# Patient Record
Sex: MALE | Race: WHITE | NOT HISPANIC OR LATINO | Employment: FULL TIME | URBAN - METROPOLITAN AREA
[De-identification: names, ages, dates, MRNs, and addresses within clinical notes are randomized per-mention and may not be internally consistent; named-entity substitution may affect disease eponyms.]

---

## 2017-01-04 RX ORDER — BUPROPION HYDROCHLORIDE 300 MG/1
TABLET ORAL
Qty: 90 TABLET | Refills: 0 | Status: SHIPPED | OUTPATIENT
Start: 2017-01-04 | End: 2017-04-03 | Stop reason: SDUPTHER

## 2017-02-22 RX ORDER — FLUOXETINE HYDROCHLORIDE 20 MG/1
CAPSULE ORAL
Qty: 90 CAPSULE | Refills: 0 | Status: SHIPPED | OUTPATIENT
Start: 2017-02-22 | End: 2017-05-08 | Stop reason: SDUPTHER

## 2017-02-24 ENCOUNTER — PATIENT MESSAGE (OUTPATIENT)
Dept: FAMILY MEDICINE | Facility: CLINIC | Age: 52
End: 2017-02-24

## 2017-02-24 DIAGNOSIS — Z00.00 ANNUAL PHYSICAL EXAM: Primary | ICD-10-CM

## 2017-03-09 RX ORDER — TRAZODONE HYDROCHLORIDE 100 MG/1
TABLET ORAL
Qty: 180 TABLET | Refills: 0 | Status: SHIPPED | OUTPATIENT
Start: 2017-03-09 | End: 2017-05-08 | Stop reason: SDUPTHER

## 2017-04-03 RX ORDER — BUPROPION HYDROCHLORIDE 300 MG/1
TABLET ORAL
Qty: 90 TABLET | Refills: 0 | Status: SHIPPED | OUTPATIENT
Start: 2017-04-03 | End: 2017-05-08 | Stop reason: SDUPTHER

## 2017-05-01 ENCOUNTER — LAB VISIT (OUTPATIENT)
Dept: LAB | Facility: HOSPITAL | Age: 52
End: 2017-05-01
Attending: FAMILY MEDICINE
Payer: COMMERCIAL

## 2017-05-01 DIAGNOSIS — Z00.00 ANNUAL PHYSICAL EXAM: ICD-10-CM

## 2017-05-01 DIAGNOSIS — Z11.59 NEED FOR HEPATITIS C SCREENING TEST: ICD-10-CM

## 2017-05-01 LAB
ALBUMIN SERPL BCP-MCNC: 3.7 G/DL
ALP SERPL-CCNC: 78 U/L
ALT SERPL W/O P-5'-P-CCNC: 47 U/L
ANION GAP SERPL CALC-SCNC: 7 MMOL/L
AST SERPL-CCNC: 86 U/L
BASOPHILS # BLD AUTO: 0.04 K/UL
BASOPHILS NFR BLD: 0.5 %
BILIRUB SERPL-MCNC: 0.4 MG/DL
BUN SERPL-MCNC: 13 MG/DL
CALCIUM SERPL-MCNC: 9 MG/DL
CHLORIDE SERPL-SCNC: 106 MMOL/L
CHOLEST/HDLC SERPL: 5.7 {RATIO}
CO2 SERPL-SCNC: 27 MMOL/L
CREAT SERPL-MCNC: 0.9 MG/DL
DIFFERENTIAL METHOD: ABNORMAL
EOSINOPHIL # BLD AUTO: 0.1 K/UL
EOSINOPHIL NFR BLD: 1.3 %
ERYTHROCYTE [DISTWIDTH] IN BLOOD BY AUTOMATED COUNT: 12.5 %
EST. GFR  (AFRICAN AMERICAN): >60 ML/MIN/1.73 M^2
EST. GFR  (NON AFRICAN AMERICAN): >60 ML/MIN/1.73 M^2
GLUCOSE SERPL-MCNC: 98 MG/DL
HCT VFR BLD AUTO: 41.4 %
HCV AB SERPL QL IA: NEGATIVE
HDL/CHOLESTEROL RATIO: 17.6 %
HDLC SERPL-MCNC: 159 MG/DL
HDLC SERPL-MCNC: 28 MG/DL
HGB BLD-MCNC: 14.6 G/DL
LDLC SERPL CALC-MCNC: 108.2 MG/DL
LYMPHOCYTES # BLD AUTO: 2 K/UL
LYMPHOCYTES NFR BLD: 25.5 %
MCH RBC QN AUTO: 32.7 PG
MCHC RBC AUTO-ENTMCNC: 35.3 %
MCV RBC AUTO: 93 FL
MONOCYTES # BLD AUTO: 0.6 K/UL
MONOCYTES NFR BLD: 7.9 %
NEUTROPHILS # BLD AUTO: 5.1 K/UL
NEUTROPHILS NFR BLD: 64.5 %
NONHDLC SERPL-MCNC: 131 MG/DL
PLATELET # BLD AUTO: 230 K/UL
PMV BLD AUTO: 9.6 FL
POTASSIUM SERPL-SCNC: 4.2 MMOL/L
PROT SERPL-MCNC: 7 G/DL
RBC # BLD AUTO: 4.46 M/UL
SODIUM SERPL-SCNC: 140 MMOL/L
TRIGL SERPL-MCNC: 114 MG/DL
TSH SERPL DL<=0.005 MIU/L-ACNC: 1.55 UIU/ML
WBC # BLD AUTO: 7.96 K/UL

## 2017-05-01 PROCEDURE — 36415 COLL VENOUS BLD VENIPUNCTURE: CPT | Mod: PO

## 2017-05-01 PROCEDURE — 80053 COMPREHEN METABOLIC PANEL: CPT

## 2017-05-01 PROCEDURE — 80061 LIPID PANEL: CPT

## 2017-05-01 PROCEDURE — 85025 COMPLETE CBC W/AUTO DIFF WBC: CPT

## 2017-05-01 PROCEDURE — 84443 ASSAY THYROID STIM HORMONE: CPT

## 2017-05-01 PROCEDURE — 86803 HEPATITIS C AB TEST: CPT

## 2017-05-08 ENCOUNTER — LAB VISIT (OUTPATIENT)
Dept: LAB | Facility: HOSPITAL | Age: 52
End: 2017-05-08
Attending: FAMILY MEDICINE
Payer: COMMERCIAL

## 2017-05-08 ENCOUNTER — OFFICE VISIT (OUTPATIENT)
Dept: FAMILY MEDICINE | Facility: CLINIC | Age: 52
End: 2017-05-08
Payer: COMMERCIAL

## 2017-05-08 VITALS
DIASTOLIC BLOOD PRESSURE: 70 MMHG | SYSTOLIC BLOOD PRESSURE: 110 MMHG | HEART RATE: 72 BPM | TEMPERATURE: 98 F | WEIGHT: 183.44 LBS | HEIGHT: 71 IN | BODY MASS INDEX: 25.68 KG/M2

## 2017-05-08 DIAGNOSIS — D49.9 PRECANCEROUS LESION: ICD-10-CM

## 2017-05-08 DIAGNOSIS — R79.89 ELEVATED LIVER FUNCTION TESTS: ICD-10-CM

## 2017-05-08 DIAGNOSIS — F32.A DEPRESSION, UNSPECIFIED DEPRESSION TYPE: ICD-10-CM

## 2017-05-08 DIAGNOSIS — F41.9 ANXIETY: Primary | ICD-10-CM

## 2017-05-08 LAB
ALBUMIN SERPL BCP-MCNC: 3.9 G/DL
ALP SERPL-CCNC: 75 U/L
ALT SERPL W/O P-5'-P-CCNC: 36 U/L
AST SERPL-CCNC: 26 U/L
BILIRUB DIRECT SERPL-MCNC: 0.1 MG/DL
BILIRUB SERPL-MCNC: 0.4 MG/DL
FERRITIN SERPL-MCNC: 24 NG/ML
PROT SERPL-MCNC: 7.4 G/DL

## 2017-05-08 PROCEDURE — 82728 ASSAY OF FERRITIN: CPT

## 2017-05-08 PROCEDURE — 80074 ACUTE HEPATITIS PANEL: CPT

## 2017-05-08 PROCEDURE — 80076 HEPATIC FUNCTION PANEL: CPT

## 2017-05-08 PROCEDURE — 17000 DESTRUCT PREMALG LESION: CPT | Mod: S$GLB,,, | Performed by: FAMILY MEDICINE

## 2017-05-08 PROCEDURE — 99396 PREV VISIT EST AGE 40-64: CPT | Mod: 25,S$GLB,, | Performed by: FAMILY MEDICINE

## 2017-05-08 PROCEDURE — 99999 PR PBB SHADOW E&M-EST. PATIENT-LVL III: CPT | Mod: PBBFAC,,, | Performed by: FAMILY MEDICINE

## 2017-05-08 PROCEDURE — 36415 COLL VENOUS BLD VENIPUNCTURE: CPT | Mod: PO

## 2017-05-08 RX ORDER — FLUOXETINE HYDROCHLORIDE 20 MG/1
20 CAPSULE ORAL DAILY
Qty: 90 CAPSULE | Refills: 3 | Status: SHIPPED | OUTPATIENT
Start: 2017-05-08 | End: 2018-01-28 | Stop reason: SDUPTHER

## 2017-05-08 RX ORDER — TRAZODONE HYDROCHLORIDE 100 MG/1
200 TABLET ORAL NIGHTLY
Qty: 180 TABLET | Refills: 3 | Status: SHIPPED | OUTPATIENT
Start: 2017-05-08 | End: 2018-03-12 | Stop reason: SDUPTHER

## 2017-05-08 RX ORDER — BUPROPION HYDROCHLORIDE 300 MG/1
300 TABLET ORAL DAILY
Qty: 90 TABLET | Refills: 3 | Status: SHIPPED | OUTPATIENT
Start: 2017-05-08 | End: 2018-10-03 | Stop reason: SDUPTHER

## 2017-05-08 NOTE — MR AVS SNAPSHOT
[facility]  [address] [facility], [address]  [facility] [address]  Phone: [phone]  Fax: [phone]                  Cristobal Ramirez   2017 10:00 AM   Office Visit    Description:  Male : 1965   Provider:  Judy Davenport MD   Department:  [facility]           Reason for Visit     Annual Exam           Diagnoses this Visit        Comments    Anxiety    -  Primary     Depression, unspecified depression type         Elevated liver function tests                To Do List           Goals (5 Years of Data)     None       These Medications        Disp Refills Start End    trazodone (DESYREL) 100 MG tablet 180 tablet 3 2017     Take 2 tablets (200 mg total) by mouth nightly. - Oral    Pharmacy: [facility] [address] [place] [address] [facility] [address] [facility] #: [phone]       fluoxetine (PROZAC) 20 MG capsule 90 capsule 3 2017     Take 1 capsule (20 mg total) by mouth once daily. - Oral    Pharmacy: [facility] [address] [place] [address] [place] [place] [place] [facility] [address] [facility] Ph #: [phone]       buPROPion (WELLBUTRIN XL) 300 MG 24 hr tablet 90 tablet 3 2017     Take 1 tablet (300 mg total) by mouth once daily. - Oral    [facility] [address] [place] [address] [facility] [address] [facility] Ph #: [phone]         [facility] On Call     [facility] Nurse Care Line -  Assistance  Unless otherwise directed by your provider, please contact [facility], our nurse care line that is available for / assistance.     Registered nurses in the [facility] provide: appointment scheduling, clinical advisement, health education, and other advisory services.  Call: [phone] (toll free)               Medications           Message regarding Medications     Verify the changes and/or additions to your medication regime listed below are the same  "as discussed with your clinician today.  If any of these changes or additions are incorrect, please notify your healthcare provider.        CHANGE how you are taking these medications     Start Taking Instead of    trazodone (DESYREL) 100 MG tablet trazodone (DESYREL) 100 MG tablet    Dosage:  Take 2 tablets (200 mg total) by mouth nightly. Dosage:  TAKE 2 TABLETS BY MOUTH EVERY NIGHT AT BEDTIME    Reason for Change:  Reorder     fluoxetine (PROZAC) 20 MG capsule fluoxetine (PROZAC) 20 MG capsule    Dosage:  Take 1 capsule (20 mg total) by mouth once daily. Dosage:  TAKE 1 CAPSULE BY MOUTH EVERY DAY    Reason for Change:  Reorder     buPROPion (WELLBUTRIN XL) 300 MG 24 hr tablet buPROPion (WELLBUTRIN XL) 300 MG 24 hr tablet    Dosage:  Take 1 tablet (300 mg total) by mouth once daily. Dosage:  TAKE 1 TABLET BY MOUTH DAILY    Reason for Change:  Reorder       STOP taking these medications     sodium,potassium,mag sulfates (SUPREP BOWEL PREP KIT) 17.5-3.13-1.6 gram SolR Take 1 each by mouth as directed.           Verify that the below list of medications is an accurate representation of the medications you are currently taking.  If none reported, the list may be blank. If incorrect, please contact your healthcare provider. Carry this list with you in case of emergency.           Current Medications     buPROPion (WELLBUTRIN XL) 300 MG 24 hr tablet Take 1 tablet (300 mg total) by mouth once daily.    fluoxetine (PROZAC) 20 MG capsule Take 1 capsule (20 mg total) by mouth once daily.    trazodone (DESYREL) 100 MG tablet Take 2 tablets (200 mg total) by mouth nightly.    TRUVADA 200-300 mg Tab TK 1 T PO  D           Clinical Reference Information           Your Vitals Were     BP Pulse Temp Height Weight BMI    110/70 (BP Location: Right arm) 72 97.8 °F (36.6 °C) 5' 10.5" (1.791 m) 83.2 kg (183 lb 6.8 oz) 25.95 kg/m2      Blood Pressure          Most Recent Value    BP  110/70      Allergies as of 5/8/2017     No Known " Allergies      Immunizations Administered on Date of Encounter - 5/8/2017     None      Orders Placed During Today's Visit     Future Labs/Procedures Expected by Expires    Ferritin  5/8/2017 7/7/2018    Hepatic function panel  5/8/2017 7/7/2018    HEPATITIS PANEL, ACUTE  5/8/2017 7/7/2018      Smoking Cessation     If you would like to quit smoking:   You may be eligible for free services if you are a [place] resident and started smoking cigarettes before September 1, 1988.  Call the Smoking Cessation [employer] ([facility]) toll free at [phone] or [phone].   Call 0-356-QUIT-NOW if you do not meet the above criteria.   Contact us via email: [email or web address]   View our website for more information: [email or web address]        Language Assistance Services     ATTENTION: Language assistance services are available, free of charge. Please call [phone].      ATENCIÓN: Si habla español, tiene a galvez disposición servicios gratuitos de asistencia lingüística. Llame al [phone].     CHÚ Ý: N?u b?n nói Ti?ng Vi?t, có các d?ch v? h? tr? ngôn ng? mi?n phí dành cho b?n. G?i s? [phone].         [facility] complies with applicable Federal civil rights laws and does not discriminate on the basis of race, color, national origin, age, disability, or sex.

## 2017-05-09 LAB
HAV IGM SERPL QL IA: NEGATIVE
HBV CORE IGM SERPL QL IA: NEGATIVE
HBV SURFACE AG SERPL QL IA: NEGATIVE
HCV AB SERPL QL IA: NEGATIVE

## 2017-05-09 NOTE — PROGRESS NOTES
Cristobal Ramirez is a 51 y.o. male     Chief Complaint:  Physical Exam  Source of history: Patient  Past Medical History:   Diagnosis Date    Allergy     Anxiety     Depression      Patient  reports that he has been smoking Cigarettes.  He has been smoking about 1.00 pack per day. He has never used smokeless tobacco. He reports that he does not drink alcohol or use illicit drugs.  No family history on file.  ROS:                                                                                GENERAL: No fever, chills, fatigability or weight loss. .  SKIN: No rashes, pt spends a lot of time on the beach noted  changes in color and texture of abdomen nevus  HEAD: No headaches or recent head trauma.  EYES: Visual acuity fine. No photophobia, ocular pain or diplopia.  EARS: Denies ear pain, discharge or vertigo.  NOSE: No loss of smell, no epistaxis or postnasal drip.  MOUTH & THROAT: No hoarseness or change in voice. No excessive gum bleeding.  NODES: Denies swollen glands.  CHEST: Denies WHEAT, cyanosis, wheezing, cough and sputum production.  CARDIOVASCULAR: Denies chest pain, PND, orthopnea or reduced exercise tolerance.  ABDOMEN: Appetite fine. No weight loss. Denies diarrhea, abdominal pain, hematemesis or blood in stool.  URINARY: No flank pain, dysuria or hematuria. Frequency of urination at nighttime.  PERIPHERAL VASCULAR: No claudication or cyanosis.  MUSCULOSKELETAL: no complaints  NEUROLOGIC: No history of seizures, paralysis, alteration of gait or coordination.    OBJECTIVE:  APPEARANCE: Well nourished, well developed, in no acute distress.   VS:  see nurses notes  May 8, 2017  SKIN: No lesions, good turgor, no rashes. Large erythematous Precancerous appearing lesion on the abdomen area  HEENT: TMs clear bilaterally, ear canals clear bilaterally, nasal mucosa clear bilaterally, sinuses nontender to percussion, throat mild postnasal drip.  HEAD: Normocephalic, nontender to palpation.  NECK: Supple  nontender, no carotid bruits, no thyromegaly.  NODES: Normal.  CHEST: Clear bilaterally, with good respiratory excursion, no evidence of respiratory distress.  CARDIOVASCULAR: S1, S2, regular rate and rhythm without murmur.  BREASTS: No masses palpated in either breast area, or axillary area, symmetry is noted.  ABDOMEN: Soft nontender no hepatosplenomegaly, no guarding or rebound, no pulsatile mass.  PERIPHERAL VASCULAR: Distal pulses palpable throughout, normal capillary refill in all distal extremities, no edema.  MUSCULOSKELETAL: No abnormalities noted.  BACK: No scoliosis, no spasm, cervical, thoracic, lumbar spine nontender to palpation  NEUROLOGIC: Cranial nerves II through XII grossly intact, motor exam 5 out of 5 on distal extremities,   no gait disturbances, sensation intact in all distal extremities  MENTAL STATUS: Patient oriented x3, normal affect, normal mood    ASSESSMENT/PLAN:   Cristobal was seen today for annual exam.    Diagnoses and all orders for this visit:    Anxiety  -     trazodone (DESYREL) 100 MG tablet; Take 2 tablets (200 mg total) by mouth nightly.  -     fluoxetine (PROZAC) 20 MG capsule; Take 1 capsule (20 mg total) by mouth once daily.  -     buPROPion (WELLBUTRIN XL) 300 MG 24 hr tablet; Take 1 tablet (300 mg total) by mouth once daily.    Depression, unspecified depression type  -     trazodone (DESYREL) 100 MG tablet; Take 2 tablets (200 mg total) by mouth nightly.  -     fluoxetine (PROZAC) 20 MG capsule; Take 1 capsule (20 mg total) by mouth once daily.  -     buPROPion (WELLBUTRIN XL) 300 MG 24 hr tablet; Take 1 tablet (300 mg total) by mouth once daily.    Elevated liver function tests  -     HEPATITIS PANEL, ACUTE; Future  -     Ferritin; Future  -     Hepatic function panel; Future    large aktinic keratosis on superior abdomen  With pt permission cryotherapy applied x3 with good margins.  Pt tolerated procedure without difficulty  Patient will apply bandage after skin  exfoliates allowed to heal  Follow-up if nevus seems to reoccur    We will contact patient with results of testing when available.

## 2017-07-06 RX ORDER — BUPROPION HYDROCHLORIDE 300 MG/1
TABLET ORAL
Qty: 90 TABLET | Refills: 0 | Status: SHIPPED | OUTPATIENT
Start: 2017-07-06 | End: 2018-10-03 | Stop reason: SDUPTHER

## 2017-12-12 RX ORDER — BUPROPION HYDROCHLORIDE 300 MG/1
TABLET ORAL
Qty: 90 TABLET | Refills: 0 | Status: SHIPPED | OUTPATIENT
Start: 2017-12-12 | End: 2018-10-03 | Stop reason: SDUPTHER

## 2018-01-28 DIAGNOSIS — F32.A DEPRESSION, UNSPECIFIED DEPRESSION TYPE: ICD-10-CM

## 2018-01-28 DIAGNOSIS — F41.9 ANXIETY: ICD-10-CM

## 2018-01-28 RX ORDER — FLUOXETINE HYDROCHLORIDE 20 MG/1
CAPSULE ORAL
Qty: 90 CAPSULE | Refills: 0 | Status: SHIPPED | OUTPATIENT
Start: 2018-01-28 | End: 2018-07-26 | Stop reason: SDUPTHER

## 2018-03-07 RX ORDER — BUPROPION HYDROCHLORIDE 300 MG/1
TABLET ORAL
Qty: 90 TABLET | Refills: 0 | Status: SHIPPED | OUTPATIENT
Start: 2018-03-07 | End: 2018-10-03 | Stop reason: SDUPTHER

## 2018-03-12 DIAGNOSIS — F32.A DEPRESSION, UNSPECIFIED DEPRESSION TYPE: ICD-10-CM

## 2018-03-12 DIAGNOSIS — F41.9 ANXIETY: ICD-10-CM

## 2018-03-12 RX ORDER — TRAZODONE HYDROCHLORIDE 100 MG/1
TABLET ORAL
Qty: 180 TABLET | Refills: 0 | Status: SHIPPED | OUTPATIENT
Start: 2018-03-12 | End: 2018-05-28 | Stop reason: SDUPTHER

## 2018-05-28 DIAGNOSIS — F41.9 ANXIETY: ICD-10-CM

## 2018-05-28 DIAGNOSIS — F32.A DEPRESSION, UNSPECIFIED DEPRESSION TYPE: ICD-10-CM

## 2018-05-28 RX ORDER — TRAZODONE HYDROCHLORIDE 100 MG/1
TABLET ORAL
Qty: 180 TABLET | Refills: 0 | Status: SHIPPED | OUTPATIENT
Start: 2018-05-28 | End: 2018-08-16 | Stop reason: SDUPTHER

## 2018-06-05 RX ORDER — BUPROPION HYDROCHLORIDE 300 MG/1
TABLET ORAL
Qty: 90 TABLET | Refills: 0 | Status: SHIPPED | OUTPATIENT
Start: 2018-06-05 | End: 2018-10-03 | Stop reason: SDUPTHER

## 2018-07-26 DIAGNOSIS — F41.9 ANXIETY: ICD-10-CM

## 2018-07-26 DIAGNOSIS — F32.A DEPRESSION, UNSPECIFIED DEPRESSION TYPE: ICD-10-CM

## 2018-07-26 RX ORDER — FLUOXETINE HYDROCHLORIDE 20 MG/1
CAPSULE ORAL
Qty: 90 CAPSULE | Refills: 0 | Status: SHIPPED | OUTPATIENT
Start: 2018-07-26 | End: 2019-10-30 | Stop reason: SDUPTHER

## 2018-08-16 DIAGNOSIS — F32.A DEPRESSION, UNSPECIFIED DEPRESSION TYPE: ICD-10-CM

## 2018-08-16 DIAGNOSIS — F41.9 ANXIETY: ICD-10-CM

## 2018-08-16 RX ORDER — TRAZODONE HYDROCHLORIDE 100 MG/1
TABLET ORAL
Qty: 180 TABLET | Refills: 0 | Status: SHIPPED | OUTPATIENT
Start: 2018-08-16 | End: 2019-02-22

## 2018-08-28 RX ORDER — BUPROPION HYDROCHLORIDE 300 MG/1
TABLET ORAL
Qty: 90 TABLET | Refills: 0 | Status: ON HOLD | OUTPATIENT
Start: 2018-08-28 | End: 2019-09-04 | Stop reason: HOSPADM

## 2018-09-12 ENCOUNTER — TELEPHONE (OUTPATIENT)
Dept: FAMILY MEDICINE | Facility: CLINIC | Age: 53
End: 2018-09-12

## 2018-09-12 DIAGNOSIS — Z00.00 ROUTINE GENERAL MEDICAL EXAMINATION AT A HEALTH CARE FACILITY: Primary | ICD-10-CM

## 2018-09-28 ENCOUNTER — LAB VISIT (OUTPATIENT)
Dept: LAB | Facility: HOSPITAL | Age: 53
End: 2018-09-28
Attending: FAMILY MEDICINE
Payer: COMMERCIAL

## 2018-09-28 DIAGNOSIS — Z00.00 ROUTINE GENERAL MEDICAL EXAMINATION AT A HEALTH CARE FACILITY: ICD-10-CM

## 2018-09-28 LAB
ALBUMIN SERPL BCP-MCNC: 3.9 G/DL
ALP SERPL-CCNC: 82 U/L
ALT SERPL W/O P-5'-P-CCNC: 25 U/L
ANION GAP SERPL CALC-SCNC: 6 MMOL/L
AST SERPL-CCNC: 19 U/L
BASOPHILS # BLD AUTO: 0.06 K/UL
BASOPHILS NFR BLD: 0.7 %
BILIRUB SERPL-MCNC: 0.4 MG/DL
BUN SERPL-MCNC: 13 MG/DL
CALCIUM SERPL-MCNC: 9.3 MG/DL
CHLORIDE SERPL-SCNC: 108 MMOL/L
CHOLEST SERPL-MCNC: 168 MG/DL
CHOLEST/HDLC SERPL: 5.1 {RATIO}
CO2 SERPL-SCNC: 24 MMOL/L
CREAT SERPL-MCNC: 1 MG/DL
DIFFERENTIAL METHOD: ABNORMAL
EOSINOPHIL # BLD AUTO: 0.1 K/UL
EOSINOPHIL NFR BLD: 1.1 %
ERYTHROCYTE [DISTWIDTH] IN BLOOD BY AUTOMATED COUNT: 11.9 %
EST. GFR  (AFRICAN AMERICAN): >60 ML/MIN/1.73 M^2
EST. GFR  (NON AFRICAN AMERICAN): >60 ML/MIN/1.73 M^2
GLUCOSE SERPL-MCNC: 98 MG/DL
HCT VFR BLD AUTO: 43.3 %
HDLC SERPL-MCNC: 33 MG/DL
HDLC SERPL: 19.6 %
HGB BLD-MCNC: 14.6 G/DL
IMM GRANULOCYTES # BLD AUTO: 0.02 K/UL
IMM GRANULOCYTES NFR BLD AUTO: 0.2 %
LDLC SERPL CALC-MCNC: 112.8 MG/DL
LYMPHOCYTES # BLD AUTO: 2.4 K/UL
LYMPHOCYTES NFR BLD: 29.9 %
MCH RBC QN AUTO: 32.4 PG
MCHC RBC AUTO-ENTMCNC: 33.7 G/DL
MCV RBC AUTO: 96 FL
MONOCYTES # BLD AUTO: 0.6 K/UL
MONOCYTES NFR BLD: 7.3 %
NEUTROPHILS # BLD AUTO: 4.9 K/UL
NEUTROPHILS NFR BLD: 60.8 %
NONHDLC SERPL-MCNC: 135 MG/DL
NRBC BLD-RTO: 0 /100 WBC
PLATELET # BLD AUTO: 261 K/UL
PMV BLD AUTO: 10.2 FL
POTASSIUM SERPL-SCNC: 4.1 MMOL/L
PROT SERPL-MCNC: 7.2 G/DL
RBC # BLD AUTO: 4.5 M/UL
SODIUM SERPL-SCNC: 138 MMOL/L
TRIGL SERPL-MCNC: 111 MG/DL
TSH SERPL DL<=0.005 MIU/L-ACNC: 1.76 UIU/ML
WBC # BLD AUTO: 8.12 K/UL

## 2018-09-28 PROCEDURE — 84443 ASSAY THYROID STIM HORMONE: CPT

## 2018-09-28 PROCEDURE — 80061 LIPID PANEL: CPT

## 2018-09-28 PROCEDURE — 80053 COMPREHEN METABOLIC PANEL: CPT

## 2018-09-28 PROCEDURE — 85025 COMPLETE CBC W/AUTO DIFF WBC: CPT

## 2018-09-28 PROCEDURE — 36415 COLL VENOUS BLD VENIPUNCTURE: CPT | Mod: PO

## 2018-10-03 ENCOUNTER — OFFICE VISIT (OUTPATIENT)
Dept: FAMILY MEDICINE | Facility: CLINIC | Age: 53
End: 2018-10-03
Payer: COMMERCIAL

## 2018-10-03 ENCOUNTER — LAB VISIT (OUTPATIENT)
Dept: LAB | Facility: HOSPITAL | Age: 53
End: 2018-10-03
Attending: FAMILY MEDICINE
Payer: COMMERCIAL

## 2018-10-03 VITALS
RESPIRATION RATE: 20 BRPM | WEIGHT: 189.38 LBS | BODY MASS INDEX: 26.51 KG/M2 | HEIGHT: 71 IN | TEMPERATURE: 98 F | DIASTOLIC BLOOD PRESSURE: 68 MMHG | SYSTOLIC BLOOD PRESSURE: 116 MMHG

## 2018-10-03 DIAGNOSIS — Z12.5 ENCOUNTER FOR PROSTATE CANCER SCREENING: ICD-10-CM

## 2018-10-03 DIAGNOSIS — C44.41 BASAL CELL CARCINOMA (BCC) OF SKIN OF NECK: ICD-10-CM

## 2018-10-03 DIAGNOSIS — Z23 NEED FOR PROPHYLACTIC VACCINATION AND INOCULATION AGAINST INFLUENZA: Primary | ICD-10-CM

## 2018-10-03 DIAGNOSIS — F34.1 DYSTHYMIA: ICD-10-CM

## 2018-10-03 LAB — COMPLEXED PSA SERPL-MCNC: 0.91 NG/ML

## 2018-10-03 PROCEDURE — 84153 ASSAY OF PSA TOTAL: CPT

## 2018-10-03 PROCEDURE — 82270 OCCULT BLOOD FECES: CPT | Mod: S$GLB,,, | Performed by: FAMILY MEDICINE

## 2018-10-03 PROCEDURE — 90471 IMMUNIZATION ADMIN: CPT | Mod: S$GLB,,, | Performed by: FAMILY MEDICINE

## 2018-10-03 PROCEDURE — 99999 PR PBB SHADOW E&M-EST. PATIENT-LVL III: CPT | Mod: PBBFAC,,, | Performed by: FAMILY MEDICINE

## 2018-10-03 PROCEDURE — 99396 PREV VISIT EST AGE 40-64: CPT | Mod: 25,S$GLB,, | Performed by: FAMILY MEDICINE

## 2018-10-03 PROCEDURE — 36415 COLL VENOUS BLD VENIPUNCTURE: CPT | Mod: PO

## 2018-10-03 PROCEDURE — 90686 IIV4 VACC NO PRSV 0.5 ML IM: CPT | Mod: S$GLB,,, | Performed by: FAMILY MEDICINE

## 2018-10-03 RX ORDER — BUPROPION HYDROCHLORIDE 300 MG/1
300 TABLET ORAL DAILY
Qty: 30 TABLET | Refills: 11 | Status: SHIPPED | OUTPATIENT
Start: 2018-10-03 | End: 2019-10-19 | Stop reason: SDUPTHER

## 2018-10-03 RX ORDER — TRAZODONE HYDROCHLORIDE 100 MG/1
100 TABLET ORAL NIGHTLY
Qty: 30 TABLET | Refills: 11 | Status: SHIPPED | OUTPATIENT
Start: 2018-10-03 | End: 2019-10-03

## 2018-10-03 RX ORDER — FLUOXETINE HYDROCHLORIDE 20 MG/1
20 CAPSULE ORAL DAILY
Qty: 30 CAPSULE | Refills: 11 | Status: ON HOLD | OUTPATIENT
Start: 2018-10-03 | End: 2019-09-04 | Stop reason: HOSPADM

## 2019-02-22 ENCOUNTER — TELEPHONE (OUTPATIENT)
Dept: FAMILY MEDICINE | Facility: CLINIC | Age: 54
End: 2019-02-22

## 2019-02-22 DIAGNOSIS — F32.A DEPRESSION, UNSPECIFIED DEPRESSION TYPE: ICD-10-CM

## 2019-02-22 DIAGNOSIS — F41.9 ANXIETY: ICD-10-CM

## 2019-02-22 RX ORDER — TRAZODONE HYDROCHLORIDE 100 MG/1
200 TABLET ORAL NIGHTLY PRN
Qty: 180 TABLET | Refills: 0 | Status: SHIPPED | OUTPATIENT
Start: 2019-02-22 | End: 2019-05-18 | Stop reason: SDUPTHER

## 2019-02-22 NOTE — TELEPHONE ENCOUNTER
----- Message from Corinne Mary sent at 2/22/2019  4:06 PM CST -----  Contact: Pt Mobile/Home [phone]   Patient is calling in regards to his medication for traZODone (DESYREL) 100 MG. He said that he usually take two tablets each day but a script was sent to his pharmacy on today and the directions says for him to take one pill a day. Patient would like a call back to ask you about the change in the dosage please?

## 2019-02-22 NOTE — TELEPHONE ENCOUNTER
Trazodone RX was sent to pharmacy on 8/16/18 to take 2 tabs QHS, and then a RX was sent for 1 tab QHS on 10/3/18  Disp. 30 tabs with 11 refills. Nothing was sent today. please advise.

## 2019-04-30 NOTE — PROGRESS NOTES
Cristobal Ramirez is a 52 y.o. male     Chief Complaint:  Physical Exam  Source of history: Patient  Past Medical History:   Diagnosis Date    Allergy     Anxiety     Depression      Patient  reports that he has been smoking cigarettes.  He has been smoking about 1.00 pack per day. he has never used smokeless tobacco. He reports that he does not drink alcohol or use drugs.  History reviewed. No pertinent family history.  ROS:                                                                                GENERAL: No fever, chills, fatigability or weight loss. .  SKIN: No rashes, itching or changes in color or texture of skin.  HEAD: No headaches or recent head trauma.  EYES: Visual acuity fine. No photophobia, ocular pain or diplopia.  EARS: Denies ear pain, discharge or vertigo.  NOSE: No loss of smell, no epistaxis or postnasal drip.  MOUTH & THROAT: No hoarseness or change in voice. No excessive gum bleeding.  NODES: Denies swollen glands.  CHEST: Denies WHEAT, cyanosis, wheezing, cough and sputum production.  CARDIOVASCULAR: Denies chest pain, PND, orthopnea or reduced exercise tolerance.  ABDOMEN: Appetite fine. No weight loss. Denies diarrhea, abdominal pain, hematemesis or blood in stool.  URINARY: No flank pain, dysuria or hematuria. Frequency of urination at nighttime.  PERIPHERAL VASCULAR: No claudication or cyanosis.  MUSCULOSKELETAL:  No complaints  NEUROLOGIC: No history of seizures, paralysis, alteration of gait or coordination.    OBJECTIVE:  APPEARANCE: Well nourished, well developed, in no acute distress.   VS:  see nurses notes 10/03/2018  SKIN: No lesions, good turgor, no rashes.  Few basal cell carcinomas on the right shoulder and left shoulder  Actinic keratosis on the upper chest bilaterally  HEENT: TMs clear bilaterally, ear canals clear bilaterally, nasal mucosa clear bilaterally, sinuses nontender to percussion, throat mild postnasal drip.  HEAD: Normocephalic, nontender to  palpation.  NECK: Supple nontender, no carotid bruits, no thyromegaly.  NODES: Normal.  CHEST: Clear bilaterally, with good respiratory excursion, no evidence of respiratory distress.  CARDIOVASCULAR: S1, S2, regular rate and rhythm without murmur.  BREASTS: No masses palpated in either breast area, or axillary area, symmetry is noted.  ABDOMEN: Soft nontender no hepatosplenomegaly, no guarding or rebound, no pulsatile mass.  RECTAL: Prostate not enlarged, no masses, no rectal masses noted, Hemoccult negative.  PERIPHERAL VASCULAR: Distal pulses palpable throughout, normal capillary refill in all distal extremities, no edema.  MUSCULOSKELETAL: No abnormalities noted.  BACK: No scoliosis, no spasm, cervical, thoracic, lumbar spine nontender to palpation  NEUROLOGIC: Cranial nerves II through XII grossly intact, motor exam 5 out of 5 on distal extremities,   no gait disturbances, sensation intact in all distal extremities  MENTAL STATUS: Patient oriented x3, normal affect, normal mood    ASSESSMENT/PLAN:   Cristobal was seen today for annual exam.    Diagnoses and all orders for this visit:    Need for prophylactic vaccination and inoculation against influenza  -     Flu Vaccine - Quadrivalent (PF) (3 years & older)    Encounter for prostate cancer screening  -     PSA, Screening; Future    Dysthymia  -     buPROPion (WELLBUTRIN XL) 300 MG 24 hr tablet; Take 1 tablet (300 mg total) by mouth once daily.  -     FLUoxetine (PROZAC) 20 MG capsule; Take 1 capsule (20 mg total) by mouth once daily.  -     traZODone (DESYREL) 100 MG tablet; Take 1 tablet (100 mg total) by mouth every evening.    Basal cell carcinoma (BCC) of skin of neck  -     Ambulatory referral to Dermatology    Actinic keratosis   With patient's permission cryotherapy with good margins was applied to 5 lesions x2 patient tolerated procedure without any difficulty    Smoker no symptoms  Patient not interested in smoking cessation at this time   No

## 2019-05-18 DIAGNOSIS — F41.9 ANXIETY: ICD-10-CM

## 2019-05-18 DIAGNOSIS — F32.A DEPRESSION, UNSPECIFIED DEPRESSION TYPE: ICD-10-CM

## 2019-05-18 RX ORDER — TRAZODONE HYDROCHLORIDE 100 MG/1
TABLET ORAL
Qty: 180 TABLET | Refills: 0 | Status: SHIPPED | OUTPATIENT
Start: 2019-05-18 | End: 2019-08-15 | Stop reason: SDUPTHER

## 2019-06-24 DIAGNOSIS — Z00.00 ANNUAL PHYSICAL EXAM: Primary | ICD-10-CM

## 2019-06-24 DIAGNOSIS — Z12.5 ENCOUNTER FOR PROSTATE CANCER SCREENING: ICD-10-CM

## 2019-08-01 RX ORDER — FLUOXETINE HYDROCHLORIDE 20 MG/1
CAPSULE ORAL
Qty: 30 CAPSULE | Refills: 0 | Status: ON HOLD | OUTPATIENT
Start: 2019-08-01 | End: 2019-09-04 | Stop reason: HOSPADM

## 2019-08-15 DIAGNOSIS — F32.A DEPRESSION, UNSPECIFIED DEPRESSION TYPE: ICD-10-CM

## 2019-08-15 DIAGNOSIS — F41.9 ANXIETY: ICD-10-CM

## 2019-08-15 RX ORDER — TRAZODONE HYDROCHLORIDE 100 MG/1
TABLET ORAL
Qty: 180 TABLET | Refills: 0 | Status: ON HOLD | OUTPATIENT
Start: 2019-08-15 | End: 2019-09-04 | Stop reason: HOSPADM

## 2019-08-31 ENCOUNTER — HOSPITAL ENCOUNTER (OUTPATIENT)
Facility: HOSPITAL | Age: 54
Discharge: HOME | DRG: 501 | End: 2019-09-04
Attending: EMERGENCY MEDICINE | Admitting: HOSPITALIST
Payer: COMMERCIAL

## 2019-08-31 DIAGNOSIS — M25.421 ELBOW SWELLING, RIGHT: ICD-10-CM

## 2019-08-31 DIAGNOSIS — M71.021 ABSCESS OF BURSA OF RIGHT ELBOW: ICD-10-CM

## 2019-08-31 DIAGNOSIS — L03.113 RIGHT ARM CELLULITIS: Primary | ICD-10-CM

## 2019-08-31 LAB
ALBUMIN SERPL BCP-MCNC: 3.2 G/DL (ref 3.5–5.2)
ALP SERPL-CCNC: 78 U/L (ref 55–135)
ALT SERPL W/O P-5'-P-CCNC: 14 U/L (ref 10–44)
ANION GAP SERPL CALC-SCNC: 9 MMOL/L (ref 8–16)
AST SERPL-CCNC: 12 U/L (ref 10–40)
BASOPHILS # BLD AUTO: 0.05 K/UL (ref 0–0.2)
BASOPHILS NFR BLD: 0.7 % (ref 0–1.9)
BILIRUB SERPL-MCNC: 0.2 MG/DL (ref 0.1–1)
BUN SERPL-MCNC: 17 MG/DL (ref 6–20)
CALCIUM SERPL-MCNC: 9.4 MG/DL (ref 8.7–10.5)
CHLORIDE SERPL-SCNC: 105 MMOL/L (ref 95–110)
CO2 SERPL-SCNC: 27 MMOL/L (ref 23–29)
CREAT SERPL-MCNC: 1 MG/DL (ref 0.5–1.4)
CRP SERPL-MCNC: 113.2 MG/L (ref 0–8.2)
DIFFERENTIAL METHOD: ABNORMAL
EOSINOPHIL # BLD AUTO: 0.2 K/UL (ref 0–0.5)
EOSINOPHIL NFR BLD: 3.4 % (ref 0–8)
ERYTHROCYTE [DISTWIDTH] IN BLOOD BY AUTOMATED COUNT: 11.4 % (ref 11.5–14.5)
ERYTHROCYTE [SEDIMENTATION RATE] IN BLOOD BY WESTERGREN METHOD: 42 MM/HR (ref 0–23)
EST. GFR  (AFRICAN AMERICAN): >60 ML/MIN/1.73 M^2
EST. GFR  (NON AFRICAN AMERICAN): >60 ML/MIN/1.73 M^2
GLUCOSE SERPL-MCNC: 104 MG/DL (ref 70–110)
HCT VFR BLD AUTO: 37.3 % (ref 40–54)
HGB BLD-MCNC: 12 G/DL (ref 14–18)
IMM GRANULOCYTES # BLD AUTO: 0.02 K/UL (ref 0–0.04)
IMM GRANULOCYTES NFR BLD AUTO: 0.3 % (ref 0–0.5)
LYMPHOCYTES # BLD AUTO: 1.7 K/UL (ref 1–4.8)
LYMPHOCYTES NFR BLD: 24 % (ref 18–48)
MCH RBC QN AUTO: 31.8 PG (ref 27–31)
MCHC RBC AUTO-ENTMCNC: 32.2 G/DL (ref 32–36)
MCV RBC AUTO: 99 FL (ref 82–98)
MONOCYTES # BLD AUTO: 0.6 K/UL (ref 0.3–1)
MONOCYTES NFR BLD: 8.2 % (ref 4–15)
NEUTROPHILS # BLD AUTO: 4.4 K/UL (ref 1.8–7.7)
NEUTROPHILS NFR BLD: 63.4 % (ref 38–73)
NRBC BLD-RTO: 0 /100 WBC
PLATELET # BLD AUTO: 324 K/UL (ref 150–350)
PMV BLD AUTO: 9.7 FL (ref 9.2–12.9)
POTASSIUM SERPL-SCNC: 3.9 MMOL/L (ref 3.5–5.1)
PROT SERPL-MCNC: 7.1 G/DL (ref 6–8.4)
RBC # BLD AUTO: 3.77 M/UL (ref 4.6–6.2)
SODIUM SERPL-SCNC: 141 MMOL/L (ref 136–145)
WBC # BLD AUTO: 6.97 K/UL (ref 3.9–12.7)

## 2019-08-31 PROCEDURE — 86140 C-REACTIVE PROTEIN: CPT

## 2019-08-31 PROCEDURE — 87186 SC STD MICRODIL/AGAR DIL: CPT

## 2019-08-31 PROCEDURE — 99223 1ST HOSP IP/OBS HIGH 75: CPT | Mod: ,,, | Performed by: INTERNAL MEDICINE

## 2019-08-31 PROCEDURE — 87040 BLOOD CULTURE FOR BACTERIA: CPT

## 2019-08-31 PROCEDURE — 99285 PR EMERGENCY DEPT VISIT,LEVEL V: ICD-10-PCS | Mod: ,,, | Performed by: EMERGENCY MEDICINE

## 2019-08-31 PROCEDURE — 63600175 PHARM REV CODE 636 W HCPCS: Performed by: INTERNAL MEDICINE

## 2019-08-31 PROCEDURE — G0378 HOSPITAL OBSERVATION PER HR: HCPCS

## 2019-08-31 PROCEDURE — 85025 COMPLETE CBC W/AUTO DIFF WBC: CPT

## 2019-08-31 PROCEDURE — 85652 RBC SED RATE AUTOMATED: CPT

## 2019-08-31 PROCEDURE — 99285 EMERGENCY DEPT VISIT HI MDM: CPT | Mod: ,,, | Performed by: EMERGENCY MEDICINE

## 2019-08-31 PROCEDURE — 25000003 PHARM REV CODE 250: Performed by: EMERGENCY MEDICINE

## 2019-08-31 PROCEDURE — S0077 INJECTION, CLINDAMYCIN PHOSP: HCPCS | Performed by: EMERGENCY MEDICINE

## 2019-08-31 PROCEDURE — 99285 EMERGENCY DEPT VISIT HI MDM: CPT | Mod: 25

## 2019-08-31 PROCEDURE — 25000003 PHARM REV CODE 250: Performed by: INTERNAL MEDICINE

## 2019-08-31 PROCEDURE — 12000002 HC ACUTE/MED SURGE SEMI-PRIVATE ROOM

## 2019-08-31 PROCEDURE — 87070 CULTURE OTHR SPECIMN AEROBIC: CPT

## 2019-08-31 PROCEDURE — 87077 CULTURE AEROBIC IDENTIFY: CPT

## 2019-08-31 PROCEDURE — 99223 PR INITIAL HOSPITAL CARE,LEVL III: ICD-10-PCS | Mod: ,,, | Performed by: INTERNAL MEDICINE

## 2019-08-31 PROCEDURE — 96365 THER/PROPH/DIAG IV INF INIT: CPT

## 2019-08-31 PROCEDURE — 80053 COMPREHEN METABOLIC PANEL: CPT

## 2019-08-31 RX ORDER — EMTRICITABINE AND TENOFOVIR DISOPROXIL FUMARATE 200; 300 MG/1; MG/1
1 TABLET, FILM COATED ORAL DAILY
Status: DISCONTINUED | OUTPATIENT
Start: 2019-09-01 | End: 2019-09-04 | Stop reason: HOSPADM

## 2019-08-31 RX ORDER — ONDANSETRON 8 MG/1
8 TABLET, ORALLY DISINTEGRATING ORAL EVERY 8 HOURS PRN
Status: DISCONTINUED | OUTPATIENT
Start: 2019-08-31 | End: 2019-09-04 | Stop reason: HOSPADM

## 2019-08-31 RX ORDER — RAMELTEON 8 MG/1
8 TABLET ORAL NIGHTLY PRN
Status: DISCONTINUED | OUTPATIENT
Start: 2019-08-31 | End: 2019-09-04 | Stop reason: HOSPADM

## 2019-08-31 RX ORDER — BUPROPION HYDROCHLORIDE 300 MG/1
300 TABLET ORAL DAILY
Status: DISCONTINUED | OUTPATIENT
Start: 2019-09-01 | End: 2019-09-04 | Stop reason: HOSPADM

## 2019-08-31 RX ORDER — FLUOXETINE HYDROCHLORIDE 20 MG/1
20 CAPSULE ORAL DAILY
Status: DISCONTINUED | OUTPATIENT
Start: 2019-09-01 | End: 2019-09-04 | Stop reason: HOSPADM

## 2019-08-31 RX ORDER — OXYCODONE HYDROCHLORIDE 5 MG/1
5 TABLET ORAL EVERY 6 HOURS PRN
Status: DISCONTINUED | OUTPATIENT
Start: 2019-08-31 | End: 2019-09-04 | Stop reason: HOSPADM

## 2019-08-31 RX ORDER — ACETAMINOPHEN 325 MG/1
650 TABLET ORAL EVERY 4 HOURS PRN
Status: DISCONTINUED | OUTPATIENT
Start: 2019-08-31 | End: 2019-09-04 | Stop reason: HOSPADM

## 2019-08-31 RX ORDER — GLUCAGON 1 MG
1 KIT INJECTION
Status: DISCONTINUED | OUTPATIENT
Start: 2019-08-31 | End: 2019-09-04 | Stop reason: HOSPADM

## 2019-08-31 RX ORDER — TRAZODONE HYDROCHLORIDE 100 MG/1
100 TABLET ORAL NIGHTLY
Status: DISCONTINUED | OUTPATIENT
Start: 2019-08-31 | End: 2019-09-04 | Stop reason: HOSPADM

## 2019-08-31 RX ORDER — PROMETHAZINE HYDROCHLORIDE 25 MG/1
25 TABLET ORAL EVERY 6 HOURS PRN
Status: DISCONTINUED | OUTPATIENT
Start: 2019-08-31 | End: 2019-09-04 | Stop reason: HOSPADM

## 2019-08-31 RX ORDER — OXYCODONE HYDROCHLORIDE 10 MG/1
10 TABLET ORAL EVERY 6 HOURS PRN
Status: DISCONTINUED | OUTPATIENT
Start: 2019-08-31 | End: 2019-09-04 | Stop reason: HOSPADM

## 2019-08-31 RX ORDER — SODIUM CHLORIDE 0.9 % (FLUSH) 0.9 %
10 SYRINGE (ML) INJECTION
Status: DISCONTINUED | OUTPATIENT
Start: 2019-08-31 | End: 2019-09-04 | Stop reason: HOSPADM

## 2019-08-31 RX ORDER — ENOXAPARIN SODIUM 100 MG/ML
40 INJECTION SUBCUTANEOUS EVERY 24 HOURS
Status: DISCONTINUED | OUTPATIENT
Start: 2019-08-31 | End: 2019-09-04 | Stop reason: HOSPADM

## 2019-08-31 RX ORDER — IBUPROFEN 200 MG
24 TABLET ORAL
Status: DISCONTINUED | OUTPATIENT
Start: 2019-08-31 | End: 2019-09-04 | Stop reason: HOSPADM

## 2019-08-31 RX ORDER — IBUPROFEN 200 MG
16 TABLET ORAL
Status: DISCONTINUED | OUTPATIENT
Start: 2019-08-31 | End: 2019-09-04 | Stop reason: HOSPADM

## 2019-08-31 RX ORDER — CLINDAMYCIN PHOSPHATE 600 MG/50ML
600 INJECTION, SOLUTION INTRAVENOUS
Status: COMPLETED | OUTPATIENT
Start: 2019-08-31 | End: 2019-08-31

## 2019-08-31 RX ADMIN — TRAZODONE HYDROCHLORIDE 100 MG: 100 TABLET ORAL at 11:08

## 2019-08-31 RX ADMIN — ENOXAPARIN SODIUM 40 MG: 100 INJECTION SUBCUTANEOUS at 11:08

## 2019-08-31 RX ADMIN — CLINDAMYCIN IN 5 PERCENT DEXTROSE 600 MG: 12 INJECTION, SOLUTION INTRAVENOUS at 08:08

## 2019-09-01 PROBLEM — M71.121 SEPTIC OLECRANON BURSITIS OF RIGHT ELBOW: Status: ACTIVE | Noted: 2019-09-01

## 2019-09-01 LAB
ANION GAP SERPL CALC-SCNC: 8 MMOL/L (ref 8–16)
BASOPHILS # BLD AUTO: 0.05 K/UL (ref 0–0.2)
BASOPHILS NFR BLD: 0.8 % (ref 0–1.9)
BUN SERPL-MCNC: 14 MG/DL (ref 6–20)
CALCIUM SERPL-MCNC: 8.8 MG/DL (ref 8.7–10.5)
CHLORIDE SERPL-SCNC: 109 MMOL/L (ref 95–110)
CO2 SERPL-SCNC: 26 MMOL/L (ref 23–29)
CREAT SERPL-MCNC: 0.8 MG/DL (ref 0.5–1.4)
DIFFERENTIAL METHOD: ABNORMAL
EOSINOPHIL # BLD AUTO: 0.2 K/UL (ref 0–0.5)
EOSINOPHIL NFR BLD: 3.2 % (ref 0–8)
ERYTHROCYTE [DISTWIDTH] IN BLOOD BY AUTOMATED COUNT: 11.4 % (ref 11.5–14.5)
EST. GFR  (AFRICAN AMERICAN): >60 ML/MIN/1.73 M^2
EST. GFR  (NON AFRICAN AMERICAN): >60 ML/MIN/1.73 M^2
ESTIMATED AVG GLUCOSE: 91 MG/DL (ref 68–131)
FOLATE SERPL-MCNC: 14.9 NG/ML (ref 4–24)
GLUCOSE SERPL-MCNC: 95 MG/DL (ref 70–110)
HBA1C MFR BLD HPLC: 4.8 % (ref 4–5.6)
HCT VFR BLD AUTO: 36.6 % (ref 40–54)
HGB BLD-MCNC: 11.5 G/DL (ref 14–18)
IMM GRANULOCYTES # BLD AUTO: 0.03 K/UL (ref 0–0.04)
IMM GRANULOCYTES NFR BLD AUTO: 0.5 % (ref 0–0.5)
IRON SERPL-MCNC: 32 UG/DL (ref 45–160)
LYMPHOCYTES # BLD AUTO: 1.3 K/UL (ref 1–4.8)
LYMPHOCYTES NFR BLD: 19.9 % (ref 18–48)
MAGNESIUM SERPL-MCNC: 2.3 MG/DL (ref 1.6–2.6)
MCH RBC QN AUTO: 31.3 PG (ref 27–31)
MCHC RBC AUTO-ENTMCNC: 31.4 G/DL (ref 32–36)
MCV RBC AUTO: 100 FL (ref 82–98)
MONOCYTES # BLD AUTO: 0.7 K/UL (ref 0.3–1)
MONOCYTES NFR BLD: 11 % (ref 4–15)
NEUTROPHILS # BLD AUTO: 4.2 K/UL (ref 1.8–7.7)
NEUTROPHILS NFR BLD: 64.6 % (ref 38–73)
NRBC BLD-RTO: 0 /100 WBC
PHOSPHATE SERPL-MCNC: 3.4 MG/DL (ref 2.7–4.5)
PLATELET # BLD AUTO: 312 K/UL (ref 150–350)
PMV BLD AUTO: 9.7 FL (ref 9.2–12.9)
POTASSIUM SERPL-SCNC: 3.8 MMOL/L (ref 3.5–5.1)
RBC # BLD AUTO: 3.68 M/UL (ref 4.6–6.2)
SATURATED IRON: 12 % (ref 20–50)
SODIUM SERPL-SCNC: 143 MMOL/L (ref 136–145)
TOTAL IRON BINDING CAPACITY: 275 UG/DL (ref 250–450)
TRANSFERRIN SERPL-MCNC: 186 MG/DL (ref 200–375)
VIT B12 SERPL-MCNC: 413 PG/ML (ref 210–950)
WBC # BLD AUTO: 6.52 K/UL (ref 3.9–12.7)

## 2019-09-01 PROCEDURE — 83540 ASSAY OF IRON: CPT

## 2019-09-01 PROCEDURE — G0378 HOSPITAL OBSERVATION PER HR: HCPCS

## 2019-09-01 PROCEDURE — 99233 PR SUBSEQUENT HOSPITAL CARE,LEVL III: ICD-10-PCS | Mod: ,,, | Performed by: HOSPITALIST

## 2019-09-01 PROCEDURE — 83036 HEMOGLOBIN GLYCOSYLATED A1C: CPT

## 2019-09-01 PROCEDURE — 25000003 PHARM REV CODE 250: Performed by: INTERNAL MEDICINE

## 2019-09-01 PROCEDURE — 85025 COMPLETE CBC W/AUTO DIFF WBC: CPT

## 2019-09-01 PROCEDURE — 83735 ASSAY OF MAGNESIUM: CPT

## 2019-09-01 PROCEDURE — 82746 ASSAY OF FOLIC ACID SERUM: CPT

## 2019-09-01 PROCEDURE — 63600175 PHARM REV CODE 636 W HCPCS: Performed by: INTERNAL MEDICINE

## 2019-09-01 PROCEDURE — 82607 VITAMIN B-12: CPT

## 2019-09-01 PROCEDURE — 84100 ASSAY OF PHOSPHORUS: CPT

## 2019-09-01 PROCEDURE — 11000001 HC ACUTE MED/SURG PRIVATE ROOM

## 2019-09-01 PROCEDURE — 99233 SBSQ HOSP IP/OBS HIGH 50: CPT | Mod: ,,, | Performed by: HOSPITALIST

## 2019-09-01 PROCEDURE — 86703 HIV-1/HIV-2 1 RESULT ANTBDY: CPT

## 2019-09-01 PROCEDURE — 80048 BASIC METABOLIC PNL TOTAL CA: CPT

## 2019-09-01 PROCEDURE — 36415 COLL VENOUS BLD VENIPUNCTURE: CPT

## 2019-09-01 RX ADMIN — TRAZODONE HYDROCHLORIDE 100 MG: 100 TABLET ORAL at 08:09

## 2019-09-01 RX ADMIN — BUPROPION HYDROCHLORIDE 300 MG: 300 TABLET, FILM COATED, EXTENDED RELEASE ORAL at 09:09

## 2019-09-01 RX ADMIN — VANCOMYCIN HYDROCHLORIDE 1250 MG: 1.25 INJECTION, POWDER, LYOPHILIZED, FOR SOLUTION INTRAVENOUS at 11:09

## 2019-09-01 RX ADMIN — ENOXAPARIN SODIUM 40 MG: 100 INJECTION SUBCUTANEOUS at 04:09

## 2019-09-01 RX ADMIN — VANCOMYCIN HYDROCHLORIDE 1250 MG: 1.25 INJECTION, POWDER, LYOPHILIZED, FOR SOLUTION INTRAVENOUS at 10:09

## 2019-09-01 RX ADMIN — OXYCODONE HYDROCHLORIDE 5 MG: 5 TABLET ORAL at 03:09

## 2019-09-01 RX ADMIN — VANCOMYCIN HYDROCHLORIDE 2250 MG: 500 INJECTION, POWDER, LYOPHILIZED, FOR SOLUTION INTRAVENOUS at 12:09

## 2019-09-01 RX ADMIN — CEFTRIAXONE 2 G: 2 INJECTION, SOLUTION INTRAVENOUS at 06:09

## 2019-09-01 RX ADMIN — FLUOXETINE 20 MG: 20 CAPSULE ORAL at 09:09

## 2019-09-01 NOTE — ED PROVIDER NOTES
"Encounter Date: 8/31/2019    SCRIBE #1 NOTE: I, Eleanor Omalley, am scribing for, and in the presence of,  Dr. Quintero. I have scribed the entire note.       History     Chief Complaint   Patient presents with    Cellulitis     Pt reports going to urgent care today for treatment of staph of R elbow. "The doctor's worried I might have cellulitis so I came to get IV antibiotics." Pt has note written by doctor w/ debbie     53 y.o. Male sent in to ED from urgent care for cellulitis evaluation. Patient states that he has had a boil on his right elbow for the past 5 days, and it has been getting bigger. Patient states that he went to urgent care earlier today to have the boil drained, and was sent to the ED to receive IV antibiotics and cellulitis evaluation. Patient denies trauma to the area. No recent exposure to the ocean or lake water. Patient states he is not currently in pain and has normal range of motion in his right arm.     The history is provided by the patient and medical records.     Review of patient's allergies indicates:   Allergen Reactions    Pollen extracts      Past Medical History:   Diagnosis Date    Allergy     Anxiety     Depression      Past Surgical History:   Procedure Laterality Date    COLONOSCOPY N/A 10/24/2016    [facility] ([facility])    COLONOSCOPY N/A 9/16/2016    Performed by Dalton Lane MD at [facility] (4TH FLR)    RHINOPLASTY TIP  1985     History reviewed. No pertinent family history.  Social History     Tobacco Use    Smoking status: Current Every Day Smoker     Packs/day: 1.00     Types: Cigarettes    Smokeless tobacco: Never Used   Substance Use Topics    Alcohol use: No    Drug use: No     Review of Systems   Constitutional: Negative for chills, diaphoresis and fever.   HENT: Negative for congestion and facial swelling.    Eyes: Negative for photophobia.   Respiratory: Negative for cough and shortness of breath.    Cardiovascular: " Negative for chest pain and leg swelling.   Gastrointestinal: Negative for diarrhea, nausea and vomiting.   Endocrine: Negative for polydipsia and polyuria.   Genitourinary: Negative for dysuria.   Musculoskeletal: Negative for neck pain.   Skin: Positive for wound (Right elbow bandage from I&D, swelling at incision site).   Allergic/Immunologic: Negative for immunocompromised state.   Neurological: Negative for dizziness and headaches.   Hematological: Does not bruise/bleed easily.   Psychiatric/Behavioral: Negative for behavioral problems.       Physical Exam     Initial Vitals [08/31/19 1910]   BP Pulse Resp Temp SpO2   122/76 73 16 97.6 °F (36.4 °C) 96 %      MAP       --         Physical Exam    Nursing note and vitals reviewed.  Constitutional: He appears well-developed and well-nourished. He is not diaphoretic. No distress.   HENT:   Head: Normocephalic and atraumatic.   Eyes: EOM are normal.   Neck: Neck supple.   Cardiovascular: Normal rate, regular rhythm and intact distal pulses.   Pulmonary/Chest: No respiratory distress.   Abdominal: He exhibits no distension.   Musculoskeletal: Normal range of motion.   Neurological: He is alert and oriented to person, place, and time.   Skin: Skin is warm and dry.   There is erythema to right elbow extending to upper arm and forearm with pitting edema  There is a wound opening with small piece of packing sticking out at the elbow   Psychiatric: He has a normal mood and affect. Thought content normal.     Right elbow:              ED Course   Procedures  Labs Reviewed   COMPREHENSIVE METABOLIC PANEL - Abnormal; Notable for the following components:       Result Value    Albumin 3.2 (*)     All other components within normal limits   CBC W/ AUTO DIFFERENTIAL - Abnormal; Notable for the following components:    RBC 3.77 (*)     Hemoglobin 12.0 (*)     Hematocrit 37.3 (*)     Mean Corpuscular Volume 99 (*)     Mean Corpuscular Hemoglobin 31.8 (*)     RDW 11.4 (*)     All  other components within normal limits   C-REACTIVE PROTEIN - Abnormal; Notable for the following components:    .2 (*)     All other components within normal limits   SEDIMENTATION RATE - Abnormal; Notable for the following components:    Sed Rate 42 (*)     All other components within normal limits   CULTURE, AEROBIC  (SPECIFY SOURCE)          Imaging Results          X-Ray Elbow Complete Right (Final result)  Result time 08/31/19 21:33:28    Final result by Vincent Zapata MD (08/31/19 21:33:28)                 Impression:      No acute fracture.      Electronically signed by: Vincent Zapata MD  Date:    08/31/2019  Time:    21:33             Narrative:    EXAMINATION:  XR ELBOW COMPLETE 3 VIEW RIGHT    CLINICAL HISTORY:  . Effusion, right elbow    TECHNIQUE:  AP, lateral, and radial head views of the right elbow were performed.    COMPARISON:  None    FINDINGS:  No fracture or dislocation.  No fat pad elevation.  Cartilage spaces are maintained.  Soft tissues are unremarkable.                                 Medical Decision Making:   History:   Old Medical Records: I decided to obtain old medical records.  Initial Assessment:   Pt with what appears to be septic olecranon bursitis  Able to range elbow easily with minimal pain, this does not appear to be septic arthritis  Has I and D and aspiration at the Urgent care today  Has cellulitis extending to forearm and upper arm  Clinical Tests:   Lab Tests: Ordered and Reviewed  ED Management:  Elevated CRP, no subQ gas on Xray  dont think needs further drainage or debridement at this time  Wound culture sent  Started on clindamycin,   Pt meets criteria for observation per             Scribe Attestation:   Scribe #1: I performed the above scribed service and the documentation accurately describes the services I performed. I attest to the accuracy of the note.            ED Course as of Sep 01 2054   Sat Aug 31, 2019   2148 I spoke with   Yocasta, hospitalist and pt will go to Dr. Sands.    [GK]      ED Course User Index  [GK] Denisha Quintero MD     Clinical Impression:       ICD-10-CM ICD-9-CM   1. Right arm cellulitis L03.113 682.3   2. Elbow swelling, right M25.421 719.02   3. Abscess of bursa of right elbow M71.021 727.89                                Denisha Quintero MD  09/01/19 2054

## 2019-09-01 NOTE — ED NOTES
Pt is AAOx4. Pt is comfortable. Behavior is age appropriate and pleasant. Skin is warm, dry, and intact. Mucous membranes are pink and moist. Airway is patent.  Respirations are full, even, and non labored. Breath sounds are present and clear throughout all lung fields.  Abdomen is soft, flat, non distended, and non tender x 4 quads. Normoactive bowel sounds present x 4 quadrants. Pulses are palpable in bilateral radial arteries. Capillary refill is brisk and  < 3 seconds in bilateral fingers. S1, S2 heart tones are present. No neuro deficits noted. Pt's identity confirmed and armband applied. Pt updated on plan of care. Pt awaiting ED physician. No needs verbalized. Pain at this time is 2/10 to right elbow. Right elbow is swollen, and red. Pitting edema noted from right elbow to right shoulder.

## 2019-09-01 NOTE — HOSPITAL COURSE
9/1 - lesion red and draining, swollen, less pain.    9/2 - Wound is looking better, less erythemic, less swollen.  Ortho perform small extension of prior I&D incision today at bedside and thoroughly clean.  estephania damon.

## 2019-09-01 NOTE — SUBJECTIVE & OBJECTIVE
Interval History: pain well controlled    Review of Systems   Constitutional: Negative for activity change, chills, fatigue and fever.   HENT: Negative for congestion and trouble swallowing.    Eyes: Negative for visual disturbance.   Respiratory: Negative for cough, choking, chest tightness and shortness of breath.    Cardiovascular: Negative for chest pain and leg swelling.   Gastrointestinal: Negative for abdominal pain.   Genitourinary: Negative for difficulty urinating, dysuria and enuresis.   Musculoskeletal: Negative for arthralgias, back pain and gait problem.   Neurological: Negative for dizziness, facial asymmetry, light-headedness and headaches.   Psychiatric/Behavioral: Negative for agitation, behavioral problems, confusion and decreased concentration.     Objective:     Vital Signs (Most Recent):  Temp: 98.3 °F (36.8 °C) (09/01/19 1154)  Pulse: 75 (09/01/19 1154)  Resp: 16 (09/01/19 1154)  BP: 133/75 (09/01/19 1154)  SpO2: 96 % (09/01/19 1154) Vital Signs (24h Range):  Temp:  [97.5 °F (36.4 °C)-98.3 °F (36.8 °C)] 98.3 °F (36.8 °C)  Pulse:  [64-75] 75  Resp:  [10-18] 16  SpO2:  [95 %-98 %] 96 %  BP: (100-133)/(66-76) 133/75     Weight: 82 kg (180 lb 12.4 oz)  Body mass index is 25.21 kg/m².    Intake/Output Summary (Last 24 hours) at 9/1/2019 1236  Last data filed at 9/1/2019 0600  Gross per 24 hour   Intake 0 ml   Output --   Net 0 ml      Physical Exam   Constitutional: He is oriented to person, place, and time. He appears well-developed and well-nourished. No distress.   HENT:   Head: Normocephalic and atraumatic.   Mouth/Throat: Oropharynx is clear and moist.   Eyes: Pupils are equal, round, and reactive to light. EOM are normal. No scleral icterus.   Neck: Normal range of motion. Neck supple.   Cardiovascular: Normal rate, regular rhythm, normal heart sounds and intact distal pulses. Exam reveals no gallop and no friction rub.   No murmur heard.  Pulmonary/Chest: Effort normal and breath sounds  normal. No stridor. No respiratory distress. He has no wheezes.   Abdominal: Soft. Bowel sounds are normal. He exhibits no distension. There is no tenderness. There is no guarding.   Musculoskeletal: Normal range of motion. He exhibits no edema or deformity.   Lymphadenopathy:     He has no cervical adenopathy.   Neurological: He is alert and oriented to person, place, and time.   Skin: Skin is warm. He is not diaphoretic. There is erythema.   Right elbow - erythemic, hot, tender, draining.  Good range of motion.        Significant Labs:   CBC:   Recent Labs   Lab 08/31/19 2004 09/01/19  0525   WBC 6.97 6.52   HGB 12.0* 11.5*   HCT 37.3* 36.6*    312     CMP:   Recent Labs   Lab 08/31/19 2004 09/01/19  0524    143   K 3.9 3.8    109   CO2 27 26    95   BUN 17 14   CREATININE 1.0 0.8   CALCIUM 9.4 8.8   PROT 7.1  --    ALBUMIN 3.2*  --    BILITOT 0.2  --    ALKPHOS 78  --    AST 12  --    ALT 14  --    ANIONGAP 9 8   EGFRNONAA >60.0 >60.0       Significant Imaging: I have reviewed and interpreted all pertinent imaging results/findings within the past 24 hours.

## 2019-09-01 NOTE — ED TRIAGE NOTES
Pt reports [facility] was [facility] today for an abscess to right elbow. Pt had an I/D performed and was instructed to come to the ED for IV antibiotics. Swelling noted to right forearm. CMS intact

## 2019-09-01 NOTE — NURSING
Patient arrived to floor via wheelchair about 1am with Vanc.infusing to LAC.  VS taken.  Visi monitor applied.  I pad updated and explained to patient.  AAOx4.  Able to ambulate independently to bathroom, make his needs known, and answer appropriately.  Armband verified using the two patient identifiers.  Yellow socks given to patient.   Currently patient using sandals to ambulate in the room.  Bed low and locked.  No skin issues.

## 2019-09-01 NOTE — HPI
52 yo man with a past medical history of anxiety and depression.  He was in his regular state of health until about 5 days ago when he got a reddened area over his right elbow.  This progressively enlarged and became painful.  He has had good range of motion of the elbow and no fever.  He denies any recent trauma to the area.  He went to an urgent care and had an I&D performed and was sent to the ED.  His friend/roommate reports that he also had MRSA infections in the past few months as well.    Saw Nicole in ED- recommended IV antibitocs and wound care ev

## 2019-09-01 NOTE — H&P
"[facility] Hospital Medicine  History & Physical    Patient Name: Cristobal Ramirez  MRN: [id number]  Admission Date: 8/31/2019  Attending Physician: Rebecca Sands MD   Primary Care Provider: Judy Davenport MD    [facility] Medicine Team: [facility] HOSP MED B W Jadyen Rust MD     Patient information was obtained from patient, past medical records and ER records.     Subjective:     Principal Problem: Right olecranon bursa abscess    Chief Complaint:   Chief Complaint   Patient presents with    Cellulitis     Pt reports going to urgent care today for treatment of staph of R elbow. "The doctor's worried I might have cellulitis so I came to get IV antibiotics." Pt has note written by doctor w/ him        HPI: Mr. Ramirez is a 54 yo man with a past medical history of anxiety and depression.  He was in his regular state of health until about 5 days ago when he got a reddened area over his right elbow.  This progressively enlarged and became painful.  He has had good range of motion of the elbow and no fever.  He denies any recent trauma to the area.  He went to an urgent care and had an I&D performed and was sent to the ED.  His friend/roommate reports that he also had MRSA infections in the past few months as well.    Past Medical History:   Diagnosis Date    Allergy     Anxiety     Depression        Past Surgical History:   Procedure Laterality Date    COLONOSCOPY N/A 10/24/2016    Performed by Ozzy Wakefield MD at [facility] (4TH FLR)    COLONOSCOPY N/A 9/16/2016    Performed by Dalton Lane MD at [facility] (4TH FLR)    RHINOPLASTY TIP  1985       Review of patient's allergies indicates:   Allergen Reactions    Pollen extracts        No current facility-administered medications on file prior to encounter.      Current Outpatient Medications on File Prior to Encounter   Medication Sig    buPROPion (WELLBUTRIN XL) 300 MG 24 hr tablet TAKE 1 TABLET BY MOUTH DAILY    buPROPion " (WELLBUTRIN XL) 300 MG 24 hr tablet Take 1 tablet (300 mg total) by mouth once daily.    FLUoxetine (PROZAC) 20 MG capsule TAKE 1 CAPSULE(20 MG) BY MOUTH EVERY DAY    FLUoxetine (PROZAC) 20 MG capsule Take 1 capsule (20 mg total) by mouth once daily.    FLUoxetine 20 MG capsule TAKE 1 CAPSULE BY MOUTH ONCE DAILY    traZODone (DESYREL) 100 MG tablet Take 1 tablet (100 mg total) by mouth every evening.    traZODone (DESYREL) 100 MG tablet TAKE 2 TABLETS(200 MG) BY MOUTH EVERY NIGHT AS NEEDED FOR INSOMNIA    TRUVADA 200-300 mg Tab TK 1 T PO  D     Family History     None        Tobacco Use    Smoking status: Current Every Day Smoker     Packs/day: 1.00     Types: Cigarettes    Smokeless tobacco: Never Used   Substance and Sexual Activity    Alcohol use: No    Drug use: No    Sexual activity: Yes     Partners: Male     Birth control/protection: None     Review of Systems   Constitutional: Positive for chills. Negative for fatigue and fever.   HENT: Negative for congestion.    Eyes: Negative for itching.   Respiratory: Negative for cough and shortness of breath.    Cardiovascular: Negative for chest pain.   Gastrointestinal: Negative for abdominal pain, constipation, diarrhea, nausea and vomiting.   Endocrine: Negative for cold intolerance.   Genitourinary: Negative for dysuria.   Musculoskeletal: Positive for joint swelling.   Skin: Positive for color change, rash and wound.   Allergic/Immunologic: Negative for immunocompromised state.   Neurological: Negative for dizziness and weakness.   Psychiatric/Behavioral: Negative for confusion. The patient is nervous/anxious.      Objective:     Vital Signs (Most Recent):  Temp: 97.6 °F (36.4 °C) (08/31/19 1910)  Pulse: 73 (08/31/19 1910)  Resp: 16 (08/31/19 1910)  BP: 122/76 (08/31/19 1910)  SpO2: 96 % (08/31/19 1910) Vital Signs (24h Range):  Temp:  [97.6 °F (36.4 °C)] 97.6 °F (36.4 °C)  Pulse:  [73] 73  Resp:  [16] 16  SpO2:  [96 %] 96 %  BP: (122)/(76) 122/76      Weight: 81.2 kg (179 lb)  Body mass index is 24.97 kg/m².    Physical Exam   Constitutional: He is oriented to person, place, and time. He appears well-developed and well-nourished.  Non-toxic appearance. He does not have a sickly appearance. He does not appear ill. No distress.   HENT:   Head: Normocephalic and atraumatic.   Nose: Nose normal.   Mouth/Throat: Oropharynx is clear and moist. No oropharyngeal exudate.   Eyes: Pupils are equal, round, and reactive to light. Conjunctivae and EOM are normal. Right eye exhibits no discharge. Left eye exhibits no discharge. No scleral icterus.   Neck: Normal range of motion. Neck supple. No JVD present. No tracheal deviation present. No thyromegaly present.   Cardiovascular: Normal rate, regular rhythm, normal heart sounds and intact distal pulses. Exam reveals no gallop and no friction rub.   No murmur heard.  Pulmonary/Chest: Effort normal and breath sounds normal. No accessory muscle usage or stridor. No tachypnea and no bradypnea. No respiratory distress. He has no wheezes. He has no rales. He exhibits no tenderness.   Abdominal: Soft. Bowel sounds are normal. He exhibits no distension and no mass. There is no tenderness. There is no rebound and no guarding.   Genitourinary:   Genitourinary Comments: No palumbo in place   Musculoskeletal: Normal range of motion. He exhibits no edema or tenderness.   Lymphadenopathy:     He has no cervical adenopathy.   No peripheral edema   Neurological: He is alert and oriented to person, place, and time. He displays normal reflexes. No cranial nerve deficit. He exhibits normal muscle tone. Coordination normal. GCS eye subscore is 4. GCS verbal subscore is 5. GCS motor subscore is 6.   Skin: Skin is warm and dry. No rash noted. No erythema. No pallor.   Right elbow olecranon bursa with maceration and swelling with purulent drainage   Psychiatric: His behavior is normal. Judgment and thought content normal. His mood appears anxious.  His speech is delayed. Cognition and memory are normal.   Nursing note and vitals reviewed.        CRANIAL NERVES     CN III, IV, VI   Pupils are equal, round, and reactive to light.  Extraocular motions are normal.       Significant Labs:   Recent Results (from the past 24 hour(s))   Comprehensive metabolic panel    Collection Time: 08/31/19  8:04 PM   Result Value Ref Range    Sodium 141 136 - 145 mmol/L    Potassium 3.9 3.5 - 5.1 mmol/L    Chloride 105 95 - 110 mmol/L    CO2 27 23 - 29 mmol/L    Glucose 104 70 - 110 mg/dL    BUN, Bld 17 6 - 20 mg/dL    Creatinine 1.0 0.5 - 1.4 mg/dL    Calcium 9.4 8.7 - 10.5 mg/dL    Total Protein 7.1 6.0 - 8.4 g/dL    Albumin 3.2 (L) 3.5 - 5.2 g/dL    Total Bilirubin 0.2 0.1 - 1.0 mg/dL    Alkaline Phosphatase 78 55 - 135 U/L    AST 12 10 - 40 U/L    ALT 14 10 - 44 U/L    Anion Gap 9 8 - 16 mmol/L    eGFR if African American >60.0 >60 mL/min/1.73 m^2    eGFR if non African American >60.0 >60 mL/min/1.73 m^2   CBC auto differential    Collection Time: 08/31/19  8:04 PM   Result Value Ref Range    WBC 6.97 3.90 - 12.70 K/uL    RBC 3.77 (L) 4.60 - 6.20 M/uL    Hemoglobin 12.0 (L) 14.0 - 18.0 g/dL    Hematocrit 37.3 (L) 40.0 - 54.0 %    Mean Corpuscular Volume 99 (H) 82 - 98 fL    Mean Corpuscular Hemoglobin 31.8 (H) 27.0 - 31.0 pg    Mean Corpuscular Hemoglobin Conc 32.2 32.0 - 36.0 g/dL    RDW 11.4 (L) 11.5 - 14.5 %    Platelets 324 150 - 350 K/uL    MPV 9.7 9.2 - 12.9 fL    Immature Granulocytes 0.3 0.0 - 0.5 %    Gran # (ANC) 4.4 1.8 - 7.7 K/uL    Immature Grans (Abs) 0.02 0.00 - 0.04 K/uL    Lymph # 1.7 1.0 - 4.8 K/uL    Mono # 0.6 0.3 - 1.0 K/uL    Eos # 0.2 0.0 - 0.5 K/uL    Baso # 0.05 0.00 - 0.20 K/uL    nRBC 0 0 /100 WBC    Gran% 63.4 38.0 - 73.0 %    Lymph% 24.0 18.0 - 48.0 %    Mono% 8.2 4.0 - 15.0 %    Eosinophil% 3.4 0.0 - 8.0 %    Basophil% 0.7 0.0 - 1.9 %    Differential Method Automated    C-reactive protein    Collection Time: 08/31/19  8:04 PM   Result Value Ref  Range    .2 (H) 0.0 - 8.2 mg/L   Sedimentation rate    Collection Time: 08/31/19  8:04 PM   Result Value Ref Range    Sed Rate 42 (H) 0 - 23 mm/Hr         Significant Imaging:   XR ELBOW COMPLETE 3 VIEW RIGHT    CLINICAL HISTORY:  . Effusion, right elbow    TECHNIQUE:  AP, lateral, and radial head views of the right elbow were performed.    COMPARISON:  None    FINDINGS:  No fracture or dislocation.  No fat pad elevation.  Cartilage spaces are maintained.  Soft tissues are unremarkable.      Impression       No acute fracture.      Electronically signed by: Vincent Zapata MD  Date: 08/31/2019  Time: 21:33       Assessment/Plan:     Right olecranon bursa abscess  Right arm cellulitis  -Worrisome for residual abscess in the bursa  -NPO after midnight  -Ortho consult  -Check HIV  -vancomycin 1.5 g in dextrose 5 % 250 mL IVPB (ready to mix), 20 mg/kg, Intravenous, Q12H  -cefTRIAXone (ROCEPHIN) 2 g in dextrose 5 % 50 mL IVPB, 2 g, Intravenous, Q24H  -oxyCODONE immediate release tablet 10 mg, 10 mg, Oral, Q6H PRN   -oxyCODONE immediate release tablet 5 mg, 5 mg, Oral, Q6H PRN    Anxiety and depression  -buPROPion TB24 tablet 300 mg, 300 mg, Oral, Daily  -FLUoxetine capsule 20 mg, 20 mg, Oral, Daily  -traZODone tablet 100 mg, 100 mg, Oral, QHS    Macrocytic anemia   -Check B12, folate, Fe studies     PREP prophylaxis  -emtricitabine-tenofovir 200-300 mg per tablet 1 tablet, 1 tablet, Oral, Daily, ODILON Rust MD     VTE Risk Mitigation (From admission, onward)        Ordered     enoxaparin injection 40 mg  Daily      08/31/19 2209     IP VTE HIGH RISK PATIENT  Once      08/31/19 2209     Place KAMILLA hose  Until discontinued      08/31/19 2209     Place sequential compression device  Until discontinued      08/31/19 2209            LAURITA Rust MD  Department of Hospital Medicine   [facility]

## 2019-09-01 NOTE — PROGRESS NOTES
[facility] Hospital Medicine  Progress Note    Patient Name: Cristobal Ramirez  MRN: [id number]  Patient Class: IP- Inpatient   Admission Date: 8/31/2019  Length of Stay: 1 days  Attending Physician: Rebecca Sands MD  Primary Care Provider: Judy Davenport MD    [facility] Medicine Team: [facility]OSP MED B Rebecca Sands MD    Subjective:     Principal Problem:Septic olecranon bursitis of right elbow        HPI:  52 yo man with a past medical history of anxiety and depression.  He was in his regular state of health until about 5 days ago when he got a reddened area over his right elbow.  This progressively enlarged and became painful.  He has had good range of motion of the elbow and no fever.  He denies any recent trauma to the area.  He went to an urgent care and had an I&D performed and was sent to the ED.  His friend/roommate reports that he also had MRSA infections in the past few months as well.    Saw Carman in ED- recommended IV antibitocs and wound care eval    Overview/Hospital Course:  9/1 - lesion red and draining, swollen, less pain.      Interval History: pain well controlled    Review of Systems   Constitutional: Negative for activity change, chills, fatigue and fever.   HENT: Negative for congestion and trouble swallowing.    Eyes: Negative for visual disturbance.   Respiratory: Negative for cough, choking, chest tightness and shortness of breath.    Cardiovascular: Negative for chest pain and leg swelling.   Gastrointestinal: Negative for abdominal pain.   Genitourinary: Negative for difficulty urinating, dysuria and enuresis.   Musculoskeletal: Negative for arthralgias, back pain and gait problem.   Neurological: Negative for dizziness, facial asymmetry, light-headedness and headaches.   Psychiatric/Behavioral: Negative for agitation, behavioral problems, confusion and decreased concentration.     Objective:     Vital Signs (Most Recent):  Temp: 98.3 °F (36.8 °C)  (09/01/19 1154)  Pulse: 75 (09/01/19 1154)  Resp: 16 (09/01/19 1154)  BP: 133/75 (09/01/19 1154)  SpO2: 96 % (09/01/19 1154) Vital Signs (24h Range):  Temp:  [97.5 °F (36.4 °C)-98.3 °F (36.8 °C)] 98.3 °F (36.8 °C)  Pulse:  [64-75] 75  Resp:  [10-18] 16  SpO2:  [95 %-98 %] 96 %  BP: (100-133)/(66-76) 133/75     Weight: 82 kg (180 lb 12.4 oz)  Body mass index is 25.21 kg/m².    Intake/Output Summary (Last 24 hours) at 9/1/2019 1236  Last data filed at 9/1/2019 0600  Gross per 24 hour   Intake 0 ml   Output --   Net 0 ml      Physical Exam   Constitutional: He is oriented to person, place, and time. He appears well-developed and well-nourished. No distress.   HENT:   Head: Normocephalic and atraumatic.   Mouth/Throat: Oropharynx is clear and moist.   Eyes: Pupils are equal, round, and reactive to light. EOM are normal. No scleral icterus.   Neck: Normal range of motion. Neck supple.   Cardiovascular: Normal rate, regular rhythm, normal heart sounds and intact distal pulses. Exam reveals no gallop and no friction rub.   No murmur heard.  Pulmonary/Chest: Effort normal and breath sounds normal. No stridor. No respiratory distress. He has no wheezes.   Abdominal: Soft. Bowel sounds are normal. He exhibits no distension. There is no tenderness. There is no guarding.   Musculoskeletal: Normal range of motion. He exhibits no edema or deformity.   Lymphadenopathy:     He has no cervical adenopathy.   Neurological: He is alert and oriented to person, place, and time.   Skin: Skin is warm. He is not diaphoretic. There is erythema.   Right elbow - erythemic, hot, tender, draining.  Good range of motion.        Significant Labs:   CBC:   Recent Labs   Lab 08/31/19 2004 09/01/19  0525   WBC 6.97 6.52   HGB 12.0* 11.5*   HCT 37.3* 36.6*    312     CMP:   Recent Labs   Lab 08/31/19  2004 09/01/19  0524    143   K 3.9 3.8    109   CO2 27 26    95   BUN 17 14   CREATININE 1.0 0.8   CALCIUM 9.4 8.8   PROT 7.1   --    ALBUMIN 3.2*  --    BILITOT 0.2  --    ALKPHOS 78  --    AST 12  --    ALT 14  --    ANIONGAP 9 8   EGFRNONAA >60.0 >60.0       Significant Imaging: I have reviewed and interpreted all pertinent imaging results/findings within the past 24 hours.      Assessment/Plan:      * Septic olecranon bursitis of right elbow  52 yo M with R septic olecranon bursitis.   Elevated CRP and drainage from bursa.   no concern for septic arthritis of elbow.  Recommend IV abx  - on rocephin and vanc  Blood culture NGSF  Wound culture - pending  wound care consulted - pending  No acute orthopedic intervention at this time.  Ortho following  non adhesive telfa dressing, bulky 4x4 over elbow and compressive ace wrap.       Olecranon bursa abscess, right  As above      Right arm cellulitis  9/1 - improving in first 12 hours      Anxiety  controllled on bupropion and fluoxetine        VTE Risk Mitigation (From admission, onward)        Ordered     enoxaparin injection 40 mg  Daily      08/31/19 2209     IP VTE HIGH RISK PATIENT  Once      08/31/19 2209     Place KAMILLA hose  Until discontinued      08/31/19 2209     Place sequential compression device  Until discontinued      08/31/19 2209                Rebecca Sands MD  Department of Hospital Medicine   [facility]

## 2019-09-01 NOTE — HPI
Pt is a 52 yo M with no significant PMH who presents with R elbow wound and pain. He reports having a boil develop on his right elbow 5 days ago. Since that time he has had progressive worsening of pain and wound with surrounding erythema of skin. He reports feeling chills last night. He denies pain with range of motion of the elbow but endorses pain with palpation of the elbow.

## 2019-09-01 NOTE — ASSESSMENT & PLAN NOTE
52 yo M with R septic olecranon bursitis.   Elevated CRP and drainage from bursa.   no concern for septic arthritis of elbow.  Recommend IV abx  - on rocephin and vanc  Blood culture NGSF  Wound culture - pending  wound care consulted - pending  No acute orthopedic intervention at this time.  Ortho following  non adhesive telfa dressing, bulky 4x4 over elbow and compressive ace wrap.

## 2019-09-01 NOTE — PROGRESS NOTES
Pharmacokinetic Initial Assessment: IV Vancomycin    Assessment/Plan:    Initiate intravenous vancomycin with loading dose of 2250 mg once.  Continue with vancomycin 1250 mg IVPB every 12 hours.  Desired empiric serum trough concentration is 10 to 20 mcg/mL.  Draw vancomycin trough level prior to 4th dose on 09/02/2019 at 1030.  Pharmacy will continue to follow and monitor vancomycin.      Please contact pharmacy at extension [phone] with any questions regarding this assessment.     Thank you for the consult,   Dany Solis       Patient brief summary:  Cristobal Ramirez is a 53 y.o. male initiated on antimicrobial therapy with IV Vancomycin for treatment of suspected skin / soft tissue infection.     Drug Allergies:   Review of patient's allergies indicates:   Allergen Reactions    Pollen extracts      Actual Body Weight:   81.2 kg    Renal Function:   Estimated Creatinine Clearance: 91 mL/min (based on SCr of 1 mg/dL).    CBC (last 72 hours):  Recent Labs   Lab Result Units 08/31/19 2004   WBC K/uL 6.97   Hemoglobin g/dL 12.0*   Hematocrit % 37.3*   Platelets K/uL 324   Gran% % 63.4   Lymph% % 24.0   Mono% % 8.2   Eosinophil% % 3.4   Basophil% % 0.7   Differential Method  Automated       Metabolic Panel (last 72 hours):  Recent Labs   Lab Result Units 08/31/19 2004   Sodium mmol/L 141   Potassium mmol/L 3.9   Chloride mmol/L 105   CO2 mmol/L 27   Glucose mg/dL 104   BUN, Bld mg/dL 17   Creatinine mg/dL 1.0   Albumin g/dL 3.2*   Total Bilirubin mg/dL 0.2   Alkaline Phosphatase U/L 78   AST U/L 12   ALT U/L 14       Drug levels (last 3 results):  No results for input(s): VANCOMYCINRA, VANCOMYCINPE, VANCOMYCINTR in the last 72 hours.    Microbiologic Results:  Microbiology Results (last 7 days)     Procedure Component Value Units Date/Time    Blood culture #2 **CANNOT BE ORDERED STAT** [[id number]] Collected:  08/31/19 2004    Order Status:  Sent Specimen:  Blood from Peripheral, Antecubital, Left Updated:   08/31/19 2026    Aerobic culture [[id number]] Collected:  08/31/19 2007    Order Status:  Sent Specimen:  Abscess from Elbow, Right Updated:  08/31/19 2025    Blood culture #1 **CANNOT BE ORDERED STAT** [[id number]] Collected:  08/31/19 1950    Order Status:  Sent Specimen:  Blood from Peripheral, Hand, Left Updated:  08/31/19 2024

## 2019-09-01 NOTE — PLAN OF CARE
Problem: Adult Inpatient Plan of Care  Goal: Absence of Hospital-Acquired Illness or Injury  Outcome: Ongoing (interventions implemented as appropriate)  No acute events throughout night. Pt AAO X 4; able to express needs. NO c/o pain.  Patient pulled IV out .  New IV inserted by JENY Steele.   Safety maintained.  Bed in low position,  call  light in reach.    Will continue to monitor

## 2019-09-01 NOTE — ASSESSMENT & PLAN NOTE
Pt is a 52 yo M with R septic olecranon bursitis. Elevated CRP and drainage from bursa. Pt is hemodynamically stable, no concern for septic arthritis of elbow. Recommend IV abx and wound care consult. No acute orthopedic intervention at this time. Will continue to follow. Recommend non adhesive telfa dressing, bulky 4x4 over elbow and compressive ace wrap.

## 2019-09-01 NOTE — CONSULTS
"[facility]  Orthopedics  Consult Note    Patient Name: Cristobal Ramirez  MRN: [id number]  Admission Date: 8/31/2019  Hospital Length of Stay: 1 days  Attending Provider: Rebecca Sands MD  Primary Care Provider: Judy Davenport MD    Patient information was obtained from patient and ER records.     Inpatient consult to Orthopedic Surgery  Consult performed by: Aleksandar Manzano MD  Consult ordered by: ODILON Rust MD        Subjective:     Principal Problem:Olecranon bursa abscess, right    Chief Complaint:   Chief Complaint   Patient presents with    Cellulitis     Pt reports going to urgent care today for treatment of staph of R elbow. "The doctor's worried I might have cellulitis so I came to get IV antibiotics." Pt has note written by doctor debbie/ him        HPI: Pt is a 52 yo M with no significant PMH who presents with R elbow wound and pain. He reports having a boil develop on his right elbow 5 days ago. Since that time he has had progressive worsening of pain and wound with surrounding erythema of skin. He reports feeling chills last night. He denies pain with range of motion of the elbow but endorses pain with palpation of the elbow.     Past Medical History:   Diagnosis Date    Allergy     Anxiety     Depression        Past Surgical History:   Procedure Laterality Date    COLONOSCOPY N/A 10/24/2016    Performed by Ozzy Wakefield MD at [facility] (4TH FLR)    COLONOSCOPY N/A 9/16/2016    Performed by Dalton Lane MD at [facility] (4TH FLR)    RHINOPLASTY TIP  1985       Review of patient's allergies indicates:   Allergen Reactions    Pollen extracts        Current Facility-Administered Medications   Medication    acetaminophen tablet 650 mg    buPROPion TB24 tablet 300 mg    cefTRIAXone (ROCEPHIN) 2 g in dextrose 5 % 50 mL IVPB    dextrose 10% (D10W) Bolus    dextrose 10% (D10W) Bolus    emtricitabine-tenofovir 200-300 mg per tablet 1 tablet    " enoxaparin injection 40 mg    FLUoxetine capsule 20 mg    glucagon (human recombinant) injection 1 mg    glucose chewable tablet 16 g    glucose chewable tablet 24 g    ondansetron disintegrating tablet 8 mg    oxyCODONE immediate release tablet 10 mg    oxyCODONE immediate release tablet 5 mg    promethazine tablet 25 mg    ramelteon tablet 8 mg    sodium chloride 0.9% flush 10 mL    traZODone tablet 100 mg    vancomycin (VANCOCIN) 2,250 mg in dextrose 5 % 500 mL IVPB    vancomycin 1.25 g in dextrose 5% 250 mL IVPB (ready to mix)     Current Outpatient Medications   Medication Sig    buPROPion (WELLBUTRIN XL) 300 MG 24 hr tablet TAKE 1 TABLET BY MOUTH DAILY    buPROPion (WELLBUTRIN XL) 300 MG 24 hr tablet Take 1 tablet (300 mg total) by mouth once daily.    FLUoxetine (PROZAC) 20 MG capsule TAKE 1 CAPSULE(20 MG) BY MOUTH EVERY DAY    FLUoxetine (PROZAC) 20 MG capsule Take 1 capsule (20 mg total) by mouth once daily.    FLUoxetine 20 MG capsule TAKE 1 CAPSULE BY MOUTH ONCE DAILY    traZODone (DESYREL) 100 MG tablet Take 1 tablet (100 mg total) by mouth every evening.    traZODone (DESYREL) 100 MG tablet TAKE 2 TABLETS(200 MG) BY MOUTH EVERY NIGHT AS NEEDED FOR INSOMNIA    TRUVADA 200-300 mg Tab TK 1 T PO  D     Family History     None        Tobacco Use    Smoking status: Current Every Day Smoker     Packs/day: 1.00     Types: Cigarettes    Smokeless tobacco: Never Used   Substance and Sexual Activity    Alcohol use: No    Drug use: No    Sexual activity: Yes     Partners: Male     Birth control/protection: None     ROS See ER Provider ROS    Objective:     Vital Signs (Most Recent):  Temp: 97.6 °F (36.4 °C) (08/31/19 1910)  Pulse: 66 (08/31/19 2239)  Resp: 18 (08/31/19 2239)  BP: 120/70 (08/31/19 2239)  SpO2: 98 % (08/31/19 2239) Vital Signs (24h Range):  Temp:  [97.6 °F (36.4 °C)] 97.6 °F (36.4 °C)  Pulse:  [66-73] 66  Resp:  [16-18] 18  SpO2:  [96 %-98 %] 98 %  BP: (120-122)/(70-76)  "120/70     Weight: 81.2 kg (179 lb)  Height: 5' 11" (180.3 cm)  Body mass index is 24.97 kg/m².    No intake or output data in the 24 hours ending 09/01/19 0014    Ortho/SPM Exam     RUE  4x5 cm macerated, erythematous lesion over posterior elbow with purulent drainage. Erythema of skin tracking medially along arm  TTP over olecranon bursa and wound  FROM shoulder, elbow and wrist. Painless with ROM.  SILT M/U/R  Motor intact AIN/PIN/M/U/R   Cap refill < 2s  2+ RP      Significant Labs:   CBC:   Recent Labs   Lab 08/31/19 2004   WBC 6.97   HGB 12.0*   HCT 37.3*        CRP:   Recent Labs   Lab 08/31/19 2004   .2*     All pertinent labs within the past 24 hours have been reviewed.    Significant Imaging: I have reviewed and interpreted all pertinent imaging results/findings.   XR R elbow shows no fx or dislocation    Assessment/Plan:     * Olecranon bursa abscess, right  Pt is a 52 yo M with R septic olecranon bursitis. Elevated CRP and drainage from bursa. Pt is hemodynamically stable, no concern for septic arthritis of elbow. Recommend IV abx and wound care consult. No acute orthopedic intervention at this time. Will continue to follow. Recommend non adhesive telfa dressing, bulky 4x4 over elbow and compressive ace wrap.           Aleksandar Manzano MD  Orthopedics  [facility]      "

## 2019-09-02 LAB
ANION GAP SERPL CALC-SCNC: 10 MMOL/L (ref 8–16)
BASOPHILS # BLD AUTO: 0.06 K/UL (ref 0–0.2)
BASOPHILS NFR BLD: 1 % (ref 0–1.9)
BUN SERPL-MCNC: 13 MG/DL (ref 6–20)
CALCIUM SERPL-MCNC: 8.6 MG/DL (ref 8.7–10.5)
CHLORIDE SERPL-SCNC: 110 MMOL/L (ref 95–110)
CO2 SERPL-SCNC: 23 MMOL/L (ref 23–29)
CREAT SERPL-MCNC: 0.8 MG/DL (ref 0.5–1.4)
DIFFERENTIAL METHOD: ABNORMAL
EOSINOPHIL # BLD AUTO: 0.2 K/UL (ref 0–0.5)
EOSINOPHIL NFR BLD: 3.8 % (ref 0–8)
ERYTHROCYTE [DISTWIDTH] IN BLOOD BY AUTOMATED COUNT: 11.3 % (ref 11.5–14.5)
EST. GFR  (AFRICAN AMERICAN): >60 ML/MIN/1.73 M^2
EST. GFR  (NON AFRICAN AMERICAN): >60 ML/MIN/1.73 M^2
GLUCOSE SERPL-MCNC: 80 MG/DL (ref 70–110)
HCT VFR BLD AUTO: 36.8 % (ref 40–54)
HGB BLD-MCNC: 12 G/DL (ref 14–18)
HIV 1+2 AB+HIV1 P24 AG SERPL QL IA: NEGATIVE
IMM GRANULOCYTES # BLD AUTO: 0.01 K/UL (ref 0–0.04)
IMM GRANULOCYTES NFR BLD AUTO: 0.2 % (ref 0–0.5)
LYMPHOCYTES # BLD AUTO: 1.9 K/UL (ref 1–4.8)
LYMPHOCYTES NFR BLD: 31.6 % (ref 18–48)
MAGNESIUM SERPL-MCNC: 2.2 MG/DL (ref 1.6–2.6)
MCH RBC QN AUTO: 31.7 PG (ref 27–31)
MCHC RBC AUTO-ENTMCNC: 32.6 G/DL (ref 32–36)
MCV RBC AUTO: 97 FL (ref 82–98)
MONOCYTES # BLD AUTO: 0.7 K/UL (ref 0.3–1)
MONOCYTES NFR BLD: 11.3 % (ref 4–15)
NEUTROPHILS # BLD AUTO: 3.1 K/UL (ref 1.8–7.7)
NEUTROPHILS NFR BLD: 52.1 % (ref 38–73)
NRBC BLD-RTO: 0 /100 WBC
PHOSPHATE SERPL-MCNC: 4.4 MG/DL (ref 2.7–4.5)
PLATELET # BLD AUTO: 332 K/UL (ref 150–350)
PMV BLD AUTO: 9.6 FL (ref 9.2–12.9)
POTASSIUM SERPL-SCNC: 4 MMOL/L (ref 3.5–5.1)
RBC # BLD AUTO: 3.79 M/UL (ref 4.6–6.2)
SODIUM SERPL-SCNC: 143 MMOL/L (ref 136–145)
VANCOMYCIN TROUGH SERPL-MCNC: 7.8 UG/ML (ref 10–22)
WBC # BLD AUTO: 5.86 K/UL (ref 3.9–12.7)

## 2019-09-02 PROCEDURE — 85025 COMPLETE CBC W/AUTO DIFF WBC: CPT

## 2019-09-02 PROCEDURE — 83735 ASSAY OF MAGNESIUM: CPT

## 2019-09-02 PROCEDURE — 84100 ASSAY OF PHOSPHORUS: CPT

## 2019-09-02 PROCEDURE — 11000001 HC ACUTE MED/SURG PRIVATE ROOM

## 2019-09-02 PROCEDURE — 25000003 PHARM REV CODE 250: Performed by: INTERNAL MEDICINE

## 2019-09-02 PROCEDURE — 36415 COLL VENOUS BLD VENIPUNCTURE: CPT

## 2019-09-02 PROCEDURE — G0378 HOSPITAL OBSERVATION PER HR: HCPCS

## 2019-09-02 PROCEDURE — 80202 ASSAY OF VANCOMYCIN: CPT

## 2019-09-02 PROCEDURE — 25000003 PHARM REV CODE 250: Performed by: STUDENT IN AN ORGANIZED HEALTH CARE EDUCATION/TRAINING PROGRAM

## 2019-09-02 PROCEDURE — 23931 I&D UPR A/E BURSA: CPT

## 2019-09-02 PROCEDURE — 80048 BASIC METABOLIC PNL TOTAL CA: CPT

## 2019-09-02 PROCEDURE — 99233 PR SUBSEQUENT HOSPITAL CARE,LEVL III: ICD-10-PCS | Mod: ,,, | Performed by: HOSPITALIST

## 2019-09-02 PROCEDURE — 63600175 PHARM REV CODE 636 W HCPCS: Performed by: HOSPITALIST

## 2019-09-02 PROCEDURE — 63600175 PHARM REV CODE 636 W HCPCS: Performed by: INTERNAL MEDICINE

## 2019-09-02 PROCEDURE — 99233 SBSQ HOSP IP/OBS HIGH 50: CPT | Mod: ,,, | Performed by: HOSPITALIST

## 2019-09-02 RX ORDER — LIDOCAINE HYDROCHLORIDE 10 MG/ML
10 INJECTION INFILTRATION; PERINEURAL ONCE
Status: COMPLETED | OUTPATIENT
Start: 2019-09-02 | End: 2019-09-02

## 2019-09-02 RX ADMIN — FLUOXETINE 20 MG: 20 CAPSULE ORAL at 09:09

## 2019-09-02 RX ADMIN — TRAZODONE HYDROCHLORIDE 100 MG: 100 TABLET ORAL at 09:09

## 2019-09-02 RX ADMIN — CEFTRIAXONE 2 G: 2 INJECTION, SOLUTION INTRAVENOUS at 12:09

## 2019-09-02 RX ADMIN — LIDOCAINE HYDROCHLORIDE 10 ML: 10 INJECTION, SOLUTION INFILTRATION; PERINEURAL at 11:09

## 2019-09-02 RX ADMIN — OXYCODONE HYDROCHLORIDE 5 MG: 5 TABLET ORAL at 07:09

## 2019-09-02 RX ADMIN — VANCOMYCIN HYDROCHLORIDE 1500 MG: 1.5 INJECTION, POWDER, LYOPHILIZED, FOR SOLUTION INTRAVENOUS at 02:09

## 2019-09-02 RX ADMIN — ENOXAPARIN SODIUM 40 MG: 100 INJECTION SUBCUTANEOUS at 06:09

## 2019-09-02 RX ADMIN — BUPROPION HYDROCHLORIDE 300 MG: 300 TABLET, FILM COATED, EXTENDED RELEASE ORAL at 09:09

## 2019-09-02 NOTE — PROGRESS NOTES
Pharmacokinetic Assessment Follow Up: IV Vancomycin    Vancomycin serum concentration assessment(s):    · Vancomycin lvl 7.8 mcg/mL, drawn 2.5 hours late    Vancomycin Regimen Plan:    · Level low, but likely on the lower side of goal of 10-15 mcg/mL for SSTI at actual trough time. To be sure he is within range, I will increase his dose from 1.25g IV q12h to 1.5g IV q12h since he is growing Staph aureus from his abscess on culture.  · Level prior to 4th dose of new regimen @0200 on 9/4/19  · Renal function stable    Drug levels (last 3 results):  Recent Labs   Lab Result Units 09/02/19  1304   Vancomycin-Trough ug/mL 7.8*       Pharmacy will continue to follow and monitor vancomycin.    Please contact pharmacy at extension [phone] for questions regarding this assessment.    Thank you for the consult,   Lina Augsutine, PharmD, [facility]  [phone]       Patient brief summary:  Cristobal Ramirez is a 53 y.o. male initiated on antimicrobial therapy with IV Vancomycin for treatment of skin & soft tissue infection    The patient's current regimen is 1.25g IV q12h    Drug Allergies:   Review of patient's allergies indicates:   Allergen Reactions    Pollen extracts        Actual Body Weight:   82kg    Renal Function:   Estimated Creatinine Clearance: 113.7 mL/min (based on SCr of 0.8 mg/dL).,       CBC (last 72 hours):  Recent Labs   Lab Result Units 08/31/19 2004 09/01/19 0524 09/01/19 0525 09/02/19  0321   WBC K/uL 6.97  --  6.52 5.86   Hemoglobin g/dL 12.0*  --  11.5* 12.0*   Hemoglobin A1C %  --  4.8  --   --    Hematocrit % 37.3*  --  36.6* 36.8*   Platelets K/uL 324  --  312 332   Gran% % 63.4  --  64.6 52.1   Lymph% % 24.0  --  19.9 31.6   Mono% % 8.2  --  11.0 11.3   Eosinophil% % 3.4  --  3.2 3.8   Basophil% % 0.7  --  0.8 1.0   Differential Method  Automated  --  Automated Automated       Metabolic Panel (last 72 hours):  Recent Labs   Lab Result Units 08/31/19 2004 09/01/19 0524 09/02/19  0321   Sodium mmol/L 141  143 143   Potassium mmol/L 3.9 3.8 4.0   Chloride mmol/L 105 109 110   CO2 mmol/L 27 26 23   Glucose mg/dL 104 95 80   BUN, Bld mg/dL 17 14 13   Creatinine mg/dL 1.0 0.8 0.8   Albumin g/dL 3.2*  --   --    Total Bilirubin mg/dL 0.2  --   --    Alkaline Phosphatase U/L 78  --   --    AST U/L 12  --   --    ALT U/L 14  --   --    Magnesium mg/dL  --  2.3 2.2   Phosphorus mg/dL  --  3.4 4.4       Vancomycin Administrations:  vancomycin given in the last 96 hours                   vancomycin 1.25 g in dextrose 5% 250 mL IVPB (ready to mix) (mg) 1,250 mg New Bag 09/01/19 2245     1,250 mg New Bag  1103    vancomycin (VANCOCIN) 2,250 mg in dextrose 5 % 500 mL IVPB (mg) 2,250 mg New Bag 09/01/19 0006                Microbiologic Results:  Microbiology Results (last 7 days)     Procedure Component Value Units Date/Time    Aerobic culture [[id number]]  (Abnormal) Collected:  08/31/19 2007    Order Status:  Completed Specimen:  Abscess from Elbow, Right Updated:  09/02/19 0745     Aerobic Bacterial Culture STAPHYLOCOCCUS AUREUS  Moderate  Susceptibility pending      Blood culture #2 **CANNOT BE ORDERED STAT** [[id number]] Collected:  08/31/19 2004    Order Status:  Completed Specimen:  Blood from Peripheral, Antecubital, Left Updated:  09/01/19 2212     Blood Culture, Routine No Growth to date      No Growth to date    Blood culture #1 **CANNOT BE ORDERED STAT** [[id number]] Collected:  08/31/19 1950    Order Status:  Completed Specimen:  Blood from Peripheral, Hand, Left Updated:  09/01/19 2212     Blood Culture, Routine No Growth to date      No Growth to date

## 2019-09-02 NOTE — PROGRESS NOTES
[facility] Hospital Medicine  Progress Note    Patient Name: Cristobal Ramirez  MRN: [id number]  Patient Class: IP- Inpatient   Admission Date: 8/31/2019  Length of Stay: 2 days  Attending Physician: Rebecca Sands MD  Primary Care Provider: Judy Davenport MD    [facility] Medicine Team: [facility] Rebecca Sands MD    Subjective:     Principal Problem:Septic olecranon bursitis of right elbow        HPI:  52 yo man with a past medical history of anxiety and depression.  He was in his regular state of health until about 5 days ago when he got a reddened area over his right elbow.  This progressively enlarged and became painful.  He has had good range of motion of the elbow and no fever.  He denies any recent trauma to the area.  He went to an urgent care and had an I&D performed and was sent to the ED.  His friend/roommate reports that he also had MRSA infections in the past few months as well.    Saw [place] in ED- recommended IV antibitocs and wound care eval    Overview/Hospital Course:  9/1 - lesion red and draining, swollen, less pain.    9/2 - Wound is looking better, less erythemic, less swollen.  Ortho perform small extension of prior I&D incision today at bedside and thoroughly clean.  conitinue vanc.        Interval History: pain well controlled    Review of Systems   Constitutional: Negative for chills, fatigue and fever.   HENT: Negative for congestion and trouble swallowing.    Respiratory: Negative for cough, choking, chest tightness and shortness of breath.    Cardiovascular: Negative for chest pain and leg swelling.   Gastrointestinal: Negative for abdominal pain.   Genitourinary: Negative for difficulty urinating.   Musculoskeletal: Negative for arthralgias, back pain and gait problem.   Skin: Positive for wound.   Neurological: Negative for dizziness, facial asymmetry, light-headedness and headaches.   Psychiatric/Behavioral: Negative for agitation, behavioral  problems, confusion and decreased concentration.     Objective:     Vital Signs (Most Recent):  Temp: 97.4 °F (36.3 °C) (09/02/19 1118)  Pulse: 65 (09/02/19 1118)  Resp: 16 (09/02/19 0927)  BP: 139/76 (09/02/19 1118)  SpO2: 96 % (09/02/19 1118) Vital Signs (24h Range):  Temp:  [97.4 °F (36.3 °C)-97.7 °F (36.5 °C)] 97.4 °F (36.3 °C)  Pulse:  [65-77] 65  Resp:  [16-18] 16  SpO2:  [95 %-97 %] 96 %  BP: (113-139)/(69-80) 139/76     Weight: 82 kg (180 lb 12.4 oz)  Body mass index is 25.21 kg/m².    Intake/Output Summary (Last 24 hours) at 9/2/2019 1447  Last data filed at 9/1/2019 1800  Gross per 24 hour   Intake 400 ml   Output --   Net 400 ml      Physical Exam   Constitutional: He is oriented to person, place, and time. He appears well-developed and well-nourished. No distress.   HENT:   Head: Normocephalic and atraumatic.   Mouth/Throat: Oropharynx is clear and moist.   Eyes: Pupils are equal, round, and reactive to light. EOM are normal. No scleral icterus.   Neck: Normal range of motion. Neck supple.   Cardiovascular: Normal rate, regular rhythm, normal heart sounds and intact distal pulses. Exam reveals no gallop and no friction rub.   No murmur heard.  Pulmonary/Chest: Effort normal and breath sounds normal. No stridor. No respiratory distress. He has no wheezes.   Abdominal: Soft. Bowel sounds are normal. He exhibits no distension. There is no tenderness. There is no guarding.   Musculoskeletal: Normal range of motion. He exhibits no edema or deformity.   Lymphadenopathy:     He has no cervical adenopathy.   Neurological: He is alert and oriented to person, place, and time.   Skin: Skin is warm. He is not diaphoretic. There is erythema.   Right elbow - erythemic, less hot and  tender, still draining.  Good range of motion.  Superficial erythema has improved.  Some edema distal to bandage       Significant Labs:   CBC:   Recent Labs   Lab 08/31/19  2004 09/01/19  0525 09/02/19  0321   WBC 6.97 6.52 5.86   HGB  12.0* 11.5* 12.0*   HCT 37.3* 36.6* 36.8*    312 332     CMP:   Recent Labs   Lab 08/31/19 2004 09/01/19  0524 09/02/19  0321    143 143   K 3.9 3.8 4.0    109 110   CO2 27 26 23    95 80   BUN 17 14 13   CREATININE 1.0 0.8 0.8   CALCIUM 9.4 8.8 8.6*   PROT 7.1  --   --    ALBUMIN 3.2*  --   --    BILITOT 0.2  --   --    ALKPHOS 78  --   --    AST 12  --   --    ALT 14  --   --    ANIONGAP 9 8 10   EGFRNONAA >60.0 >60.0 >60.0             Assessment/Plan:      * Septic olecranon bursitis of right elbow  52 yo M with R septic olecranon bursitis.   Elevated CRP and drainage from bursa.   no concern for septic arthritis of elbow.  Recommend IV abx  - on rocephin and vanc  Blood culture NGSF  Wound culture - pending  wound care consulted - pending  No acute orthopedic intervention at this time.  Ortho following  non adhesive telfa dressing, bulky 4x4 over elbow and compressive ace wrap.     9/2 - ortho to I&D today at bedside - continue rocephin and vanc. vanc trough 7.8 pharmacy adjusting dose      Olecranon bursa abscess, right  As above      Right arm cellulitis  9/1 - improving in first 12 hours  9/2 - nearly rsolved      Anxiety  controllled on bupropion and fluoxetine        VTE Risk Mitigation (From admission, onward)        Ordered     enoxaparin injection 40 mg  Daily      08/31/19 2209     IP VTE HIGH RISK PATIENT  Once      08/31/19 2209     Place KAMILLA hose  Until discontinued      08/31/19 2209     Place sequential compression device  Until discontinued      08/31/19 2209                Rebecca Sands MD  Department of Hospital Medicine   [facility]

## 2019-09-02 NOTE — PLAN OF CARE
Problem: Adult Inpatient Plan of Care  Goal: Plan of Care Review  Outcome: Ongoing (interventions implemented as appropriate)  Patient remained in stable condition today. Kept arm elevated this shift to reduce swelling. Complained of mild pain but didn't want to take anything for pain.  Educated to take it if he feels that the pain gets any stronger and he verbalized understanding.  Van trough drawn and came back low so dosing was adjusted. Compliant with care. Able to make needs known to staff. Bed in lwoest and locked position.  VSS

## 2019-09-02 NOTE — CARE UPDATE
Procedure Note: Bedside Incision and drainage of R olecranon bursa  After time out was performed and patient ID, side, and site were verified, the right olecranon was sterilly prepped in the standard fashion.  10 mL's of 1% lidocaine were injected around the site with a 25-gauge needle. After adequate analgesia was obtained, an #11 blade scalpel was used to extend the 3mm diameter sinus tract present at the inferior pole of bursa 2 millimeters proximally. Minimal serosanguinous fluid was expressed. 10ccs of betadine was injected through the incision via angiocatheter into the bursa then in the same fashion was thoroughly irrigated out with 50ccs of normal saline. The incision was left often to drain and covered with 4x4s and ace bandage for compressive wrapping. The patient was hung in stocking net to help allow swelling to decrease with gravity. The patient tolerated the procedure well with no complications.  Blood loss was minimal.

## 2019-09-02 NOTE — ASSESSMENT & PLAN NOTE
Pt is a 52 yo M with R septic olecranon bursitis. Elevated CRP and drainage from bursa. Pt is hemodynamically stable, no concern for septic arthritis of elbow. Recommend continued IV abx and wound care consult. Able to express purulence from elbow today. Believe close to complete decompression. Will perform small extension of prior I&D incision today at bedside and thoroughly clean.

## 2019-09-02 NOTE — SUBJECTIVE & OBJECTIVE
Interval History: pain well controlled    Review of Systems   Constitutional: Negative for chills, fatigue and fever.   HENT: Negative for congestion and trouble swallowing.    Respiratory: Negative for cough, choking, chest tightness and shortness of breath.    Cardiovascular: Negative for chest pain and leg swelling.   Gastrointestinal: Negative for abdominal pain.   Genitourinary: Negative for difficulty urinating.   Musculoskeletal: Negative for arthralgias, back pain and gait problem.   Skin: Positive for wound.   Neurological: Negative for dizziness, facial asymmetry, light-headedness and headaches.   Psychiatric/Behavioral: Negative for agitation, behavioral problems, confusion and decreased concentration.     Objective:     Vital Signs (Most Recent):  Temp: 97.4 °F (36.3 °C) (09/02/19 1118)  Pulse: 65 (09/02/19 1118)  Resp: 16 (09/02/19 0927)  BP: 139/76 (09/02/19 1118)  SpO2: 96 % (09/02/19 1118) Vital Signs (24h Range):  Temp:  [97.4 °F (36.3 °C)-97.7 °F (36.5 °C)] 97.4 °F (36.3 °C)  Pulse:  [65-77] 65  Resp:  [16-18] 16  SpO2:  [95 %-97 %] 96 %  BP: (113-139)/(69-80) 139/76     Weight: 82 kg (180 lb 12.4 oz)  Body mass index is 25.21 kg/m².    Intake/Output Summary (Last 24 hours) at 9/2/2019 1447  Last data filed at 9/1/2019 1800  Gross per 24 hour   Intake 400 ml   Output --   Net 400 ml      Physical Exam   Constitutional: He is oriented to person, place, and time. He appears well-developed and well-nourished. No distress.   HENT:   Head: Normocephalic and atraumatic.   Mouth/Throat: Oropharynx is clear and moist.   Eyes: Pupils are equal, round, and reactive to light. EOM are normal. No scleral icterus.   Neck: Normal range of motion. Neck supple.   Cardiovascular: Normal rate, regular rhythm, normal heart sounds and intact distal pulses. Exam reveals no gallop and no friction rub.   No murmur heard.  Pulmonary/Chest: Effort normal and breath sounds normal. No stridor. No respiratory distress. He has  no wheezes.   Abdominal: Soft. Bowel sounds are normal. He exhibits no distension. There is no tenderness. There is no guarding.   Musculoskeletal: Normal range of motion. He exhibits no edema or deformity.   Lymphadenopathy:     He has no cervical adenopathy.   Neurological: He is alert and oriented to person, place, and time.   Skin: Skin is warm. He is not diaphoretic. There is erythema.   Right elbow - erythemic, less hot and  tender, still draining.  Good range of motion.  Superficial erythema has improved.  Some edema distal to bandage       Significant Labs:   CBC:   Recent Labs   Lab 08/31/19 2004 09/01/19 0525 09/02/19 0321   WBC 6.97 6.52 5.86   HGB 12.0* 11.5* 12.0*   HCT 37.3* 36.6* 36.8*    312 332     CMP:   Recent Labs   Lab 08/31/19 2004 09/01/19 0524 09/02/19  0321    143 143   K 3.9 3.8 4.0    109 110   CO2 27 26 23    95 80   BUN 17 14 13   CREATININE 1.0 0.8 0.8   CALCIUM 9.4 8.8 8.6*   PROT 7.1  --   --    ALBUMIN 3.2*  --   --    BILITOT 0.2  --   --    ALKPHOS 78  --   --    AST 12  --   --    ALT 14  --   --    ANIONGAP 9 8 10   EGFRNONAA >60.0 >60.0 >60.0

## 2019-09-02 NOTE — SUBJECTIVE & OBJECTIVE
"Principal Problem:Septic olecranon bursitis of right elbow    Principal Orthopedic Problem: same    Interval History: Pt seen and examined at bedside. Reports pain is controlled and not worsening. He is being treated with vanc and rocephin.    Review of patient's allergies indicates:   Allergen Reactions    Pollen extracts        Current Facility-Administered Medications   Medication    acetaminophen tablet 650 mg    buPROPion TB24 tablet 300 mg    cefTRIAXone (ROCEPHIN) 2 g in dextrose 5 % 50 mL IVPB    dextrose 10% (D10W) Bolus    dextrose 10% (D10W) Bolus    emtricitabine-tenofovir 200-300 mg per tablet 1 tablet    enoxaparin injection 40 mg    FLUoxetine capsule 20 mg    glucagon (human recombinant) injection 1 mg    glucose chewable tablet 16 g    glucose chewable tablet 24 g    ondansetron disintegrating tablet 8 mg    oxyCODONE immediate release tablet 10 mg    oxyCODONE immediate release tablet 5 mg    promethazine tablet 25 mg    ramelteon tablet 8 mg    sodium chloride 0.9% flush 10 mL    traZODone tablet 100 mg    vancomycin 1.25 g in dextrose 5% 250 mL IVPB (ready to mix)     Objective:     Vital Signs (Most Recent):  Temp: 97.6 °F (36.4 °C) (09/02/19 0136)  Pulse: 72 (09/02/19 0136)  Resp: 16 (09/02/19 0136)  BP: 133/76 (09/02/19 0136)  SpO2: 96 % (09/02/19 0136) Vital Signs (24h Range):  Temp:  [97.6 °F (36.4 °C)-98.3 °F (36.8 °C)] 97.6 °F (36.4 °C)  Pulse:  [72-77] 72  Resp:  [16-18] 16  SpO2:  [95 %-96 %] 96 %  BP: (121-134)/(70-80) 133/76     Weight: 82 kg (180 lb 12.4 oz)  Height: 5' 11" (180.3 cm)  Body mass index is 25.21 kg/m².      Intake/Output Summary (Last 24 hours) at 9/2/2019 0819  Last data filed at 9/1/2019 1800  Gross per 24 hour   Intake 1500 ml   Output --   Net 1500 ml       Ortho/SPM Exam    4x5 cm macerated, erythematous lesion over posterior elbow with purulent drainage. Erythema of skin tracking medially along arm  TTP over olecranon bursa and wound  FROM " shoulder, elbow and wrist. Painless with ROM.  SILT M/U/R  Motor intact AIN/PIN/M/U/R   Cap refill < 2s  2+ RP    Significant Labs:   CBC:   Recent Labs   Lab 08/31/19 2004 09/01/19 0525 09/02/19  0321   WBC 6.97 6.52 5.86   HGB 12.0* 11.5* 12.0*   HCT 37.3* 36.6* 36.8*    312 332     CRP:   Recent Labs   Lab 08/31/19 2004   .2*     All pertinent labs within the past 24 hours have been reviewed.    Significant Imaging: I have reviewed and interpreted all pertinent imaging results/findings.

## 2019-09-02 NOTE — ASSESSMENT & PLAN NOTE
54 yo M with R septic olecranon bursitis.   Elevated CRP and drainage from bursa.   no concern for septic arthritis of elbow.  Recommend IV abx  - on rocephin and vanc  Blood culture NGSF  Wound culture - pending  wound care consulted - pending  No acute orthopedic intervention at this time.  Ortho following  non adhesive telfa dressing, bulky 4x4 over elbow and compressive ace wrap.     9/2 - ortho to I&D today at bedside - continue rocephin and vanc. vanc trough 7.8 pharmacy adjusting dose

## 2019-09-02 NOTE — PROGRESS NOTES
"[facility]  Orthopedics  Progress Note    Patient Name: Cristobal Ramirez  MRN: [id number]  Admission Date: 8/31/2019  Hospital Length of Stay: 2 days  Attending Provider: Rebecca Sands MD  Primary Care Provider: Judy Davenport MD    Subjective:     Principal Problem:Septic olecranon bursitis of right elbow    Principal Orthopedic Problem: same    Interval History: Pt seen and examined at bedside. Reports pain is controlled and not worsening. He is being treated with vanc and rocephin.    Review of patient's allergies indicates:   Allergen Reactions    Pollen extracts        Current Facility-Administered Medications   Medication    acetaminophen tablet 650 mg    buPROPion TB24 tablet 300 mg    cefTRIAXone (ROCEPHIN) 2 g in dextrose 5 % 50 mL IVPB    dextrose 10% (D10W) Bolus    dextrose 10% (D10W) Bolus    emtricitabine-tenofovir 200-300 mg per tablet 1 tablet    enoxaparin injection 40 mg    FLUoxetine capsule 20 mg    glucagon (human recombinant) injection 1 mg    glucose chewable tablet 16 g    glucose chewable tablet 24 g    ondansetron disintegrating tablet 8 mg    oxyCODONE immediate release tablet 10 mg    oxyCODONE immediate release tablet 5 mg    promethazine tablet 25 mg    ramelteon tablet 8 mg    sodium chloride 0.9% flush 10 mL    traZODone tablet 100 mg    vancomycin 1.25 g in dextrose 5% 250 mL IVPB (ready to mix)     Objective:     Vital Signs (Most Recent):  Temp: 97.6 °F (36.4 °C) (09/02/19 0136)  Pulse: 72 (09/02/19 0136)  Resp: 16 (09/02/19 0136)  BP: 133/76 (09/02/19 0136)  SpO2: 96 % (09/02/19 0136) Vital Signs (24h Range):  Temp:  [97.6 °F (36.4 °C)-98.3 °F (36.8 °C)] 97.6 °F (36.4 °C)  Pulse:  [72-77] 72  Resp:  [16-18] 16  SpO2:  [95 %-96 %] 96 %  BP: (121-134)/(70-80) 133/76     Weight: 82 kg (180 lb 12.4 oz)  Height: 5' 11" (180.3 cm)  Body mass index is 25.21 kg/m².      Intake/Output Summary (Last 24 hours) at 9/2/2019 0819  Last " data filed at 9/1/2019 1800  Gross per 24 hour   Intake 1500 ml   Output --   Net 1500 ml       Ortho/SPM Exam    4x5 cm macerated, erythematous lesion over posterior elbow with purulent drainage. Erythema of skin tracking medially along arm  TTP over olecranon bursa and wound  FROM shoulder, elbow and wrist. Painless with ROM.  SILT M/U/R  Motor intact AIN/PIN/M/U/R   Cap refill < 2s  2+ RP    Significant Labs:   CBC:   Recent Labs   Lab 08/31/19 2004 09/01/19 0525 09/02/19  0321   WBC 6.97 6.52 5.86   HGB 12.0* 11.5* 12.0*   HCT 37.3* 36.6* 36.8*    312 332     CRP:   Recent Labs   Lab 08/31/19 2004   .2*     All pertinent labs within the past 24 hours have been reviewed.    Significant Imaging: I have reviewed and interpreted all pertinent imaging results/findings.    Assessment/Plan:     Olecranon bursa abscess, right  Pt is a 54 yo M with R septic olecranon bursitis. Elevated CRP and drainage from bursa. Pt is hemodynamically stable, no concern for septic arthritis of elbow. Recommend continued IV abx and wound care consult. Able to express purulence from elbow today. Believe close to complete decompression. Will perform small extension of prior I&D incision today at bedside and thoroughly clean.          Aleksandar Manzano MD  Orthopedics  [facility]

## 2019-09-03 PROBLEM — D64.9 ANEMIA: Status: ACTIVE | Noted: 2019-09-03

## 2019-09-03 PROBLEM — M71.021 ABSCESS OF BURSA OF RIGHT ELBOW: Status: ACTIVE | Noted: 2019-09-03

## 2019-09-03 LAB
ANION GAP SERPL CALC-SCNC: 10 MMOL/L (ref 8–16)
BACTERIA SPEC AEROBE CULT: ABNORMAL
BASOPHILS # BLD AUTO: 0.07 K/UL (ref 0–0.2)
BASOPHILS NFR BLD: 1.3 % (ref 0–1.9)
BUN SERPL-MCNC: 10 MG/DL (ref 6–20)
CALCIUM SERPL-MCNC: 9.2 MG/DL (ref 8.7–10.5)
CHLORIDE SERPL-SCNC: 108 MMOL/L (ref 95–110)
CO2 SERPL-SCNC: 24 MMOL/L (ref 23–29)
CREAT SERPL-MCNC: 0.8 MG/DL (ref 0.5–1.4)
CRP SERPL-MCNC: 19.2 MG/L (ref 0–8.2)
DIFFERENTIAL METHOD: ABNORMAL
EOSINOPHIL # BLD AUTO: 0.2 K/UL (ref 0–0.5)
EOSINOPHIL NFR BLD: 3.6 % (ref 0–8)
ERYTHROCYTE [DISTWIDTH] IN BLOOD BY AUTOMATED COUNT: 11.4 % (ref 11.5–14.5)
ERYTHROCYTE [SEDIMENTATION RATE] IN BLOOD BY WESTERGREN METHOD: 77 MM/HR (ref 0–23)
EST. GFR  (AFRICAN AMERICAN): >60 ML/MIN/1.73 M^2
EST. GFR  (NON AFRICAN AMERICAN): >60 ML/MIN/1.73 M^2
GLUCOSE SERPL-MCNC: 89 MG/DL (ref 70–110)
HCT VFR BLD AUTO: 39.3 % (ref 40–54)
HGB BLD-MCNC: 12.9 G/DL (ref 14–18)
IMM GRANULOCYTES # BLD AUTO: 0.01 K/UL (ref 0–0.04)
IMM GRANULOCYTES NFR BLD AUTO: 0.2 % (ref 0–0.5)
LYMPHOCYTES # BLD AUTO: 1.8 K/UL (ref 1–4.8)
LYMPHOCYTES NFR BLD: 33.8 % (ref 18–48)
MAGNESIUM SERPL-MCNC: 2.3 MG/DL (ref 1.6–2.6)
MCH RBC QN AUTO: 31.9 PG (ref 27–31)
MCHC RBC AUTO-ENTMCNC: 32.8 G/DL (ref 32–36)
MCV RBC AUTO: 97 FL (ref 82–98)
MONOCYTES # BLD AUTO: 0.6 K/UL (ref 0.3–1)
MONOCYTES NFR BLD: 11.4 % (ref 4–15)
NEUTROPHILS # BLD AUTO: 2.7 K/UL (ref 1.8–7.7)
NEUTROPHILS NFR BLD: 49.7 % (ref 38–73)
NRBC BLD-RTO: 0 /100 WBC
PHOSPHATE SERPL-MCNC: 4.1 MG/DL (ref 2.7–4.5)
PLATELET # BLD AUTO: 357 K/UL (ref 150–350)
PMV BLD AUTO: 9.4 FL (ref 9.2–12.9)
POTASSIUM SERPL-SCNC: 4.1 MMOL/L (ref 3.5–5.1)
PROCALCITONIN SERPL IA-MCNC: 0.03 NG/ML
RBC # BLD AUTO: 4.04 M/UL (ref 4.6–6.2)
SODIUM SERPL-SCNC: 142 MMOL/L (ref 136–145)
WBC # BLD AUTO: 5.33 K/UL (ref 3.9–12.7)

## 2019-09-03 PROCEDURE — 80048 BASIC METABOLIC PNL TOTAL CA: CPT

## 2019-09-03 PROCEDURE — 11000001 HC ACUTE MED/SURG PRIVATE ROOM

## 2019-09-03 PROCEDURE — 99233 SBSQ HOSP IP/OBS HIGH 50: CPT | Mod: ,,, | Performed by: HOSPITALIST

## 2019-09-03 PROCEDURE — 85652 RBC SED RATE AUTOMATED: CPT

## 2019-09-03 PROCEDURE — G0378 HOSPITAL OBSERVATION PER HR: HCPCS

## 2019-09-03 PROCEDURE — 85025 COMPLETE CBC W/AUTO DIFF WBC: CPT

## 2019-09-03 PROCEDURE — 36415 COLL VENOUS BLD VENIPUNCTURE: CPT

## 2019-09-03 PROCEDURE — 63600175 PHARM REV CODE 636 W HCPCS: Performed by: INTERNAL MEDICINE

## 2019-09-03 PROCEDURE — 63600175 PHARM REV CODE 636 W HCPCS: Performed by: HOSPITALIST

## 2019-09-03 PROCEDURE — 84145 PROCALCITONIN (PCT): CPT

## 2019-09-03 PROCEDURE — 25000003 PHARM REV CODE 250: Performed by: INTERNAL MEDICINE

## 2019-09-03 PROCEDURE — 86140 C-REACTIVE PROTEIN: CPT

## 2019-09-03 PROCEDURE — 99233 PR SUBSEQUENT HOSPITAL CARE,LEVL III: ICD-10-PCS | Mod: ,,, | Performed by: HOSPITALIST

## 2019-09-03 PROCEDURE — 84100 ASSAY OF PHOSPHORUS: CPT

## 2019-09-03 PROCEDURE — 83735 ASSAY OF MAGNESIUM: CPT

## 2019-09-03 RX ADMIN — ENOXAPARIN SODIUM 40 MG: 100 INJECTION SUBCUTANEOUS at 05:09

## 2019-09-03 RX ADMIN — TRAZODONE HYDROCHLORIDE 100 MG: 100 TABLET ORAL at 08:09

## 2019-09-03 RX ADMIN — VANCOMYCIN HYDROCHLORIDE 1500 MG: 1.5 INJECTION, POWDER, LYOPHILIZED, FOR SOLUTION INTRAVENOUS at 03:09

## 2019-09-03 RX ADMIN — CEFTRIAXONE 2 G: 2 INJECTION, SOLUTION INTRAVENOUS at 12:09

## 2019-09-03 RX ADMIN — VANCOMYCIN HYDROCHLORIDE 1500 MG: 1.5 INJECTION, POWDER, LYOPHILIZED, FOR SOLUTION INTRAVENOUS at 01:09

## 2019-09-03 RX ADMIN — FLUOXETINE 20 MG: 20 CAPSULE ORAL at 08:09

## 2019-09-03 RX ADMIN — BUPROPION HYDROCHLORIDE 300 MG: 300 TABLET, FILM COATED, EXTENDED RELEASE ORAL at 08:09

## 2019-09-03 NOTE — PLAN OF CARE
Problem: Adult Inpatient Plan of Care  Goal: Plan of Care Review  Outcome: Ongoing (interventions implemented as appropriate)     09/03/19 1610   Plan of Care Review   Plan of Care Reviewed With patient   Progress improving   Plan of care reviewed with pt. Pt voiced understanding. Pt AAO X 4. Pt c/o pain to R elbow at the beginning of the shift but with relief from prn medication. Pt just states he is tired due to getting a roommate in the middle of the night and had a lot of noise from that. No apparent distress noted. Bed in lowest position, locked, call light within reach. Side rails up x's 2 with slip resistant socks on. Pt ambulated around on the unit without any difficulties. Pt's RUE with +2 edema. Pt was educated to keep arm elevated to help with the swelling.

## 2019-09-03 NOTE — PROGRESS NOTES
Consult received per MD for left elbow abscess. Pt was admitted on 8/31/19 from urgent care with treatment of Right elbow staph with septic olecranon bursitis.Pt has a PMHx of anxiety and depression. Chart reflects pt that had a bedside I & D yesterday.    Received pt awake and alert sitting up in bed. Pt reports that has received IV antibiotics since he was admitted and that he is looking forward to going home. Chart reflects pt has been receiving Vancomycin 1.5g every 12 hours    Upon assessment of right elbow (present upon admission): open area measuring 5 cm x 4 cm with pink granulation tissue with a stable tan scab with abscess site noted inferior portion of wound bed measuring 1 cm x 1 cm x 1 cm with a tunnel @ 12 o'clock measuring 2.5 cm. Ines wound with blistered healing appearance. Cleansed wound with sterile normal saline andlightly packed abcess wound with aquacel ag rope with aquacel ag over surrounding wound tissue. Covered area with gauze 4 x 4 and secured with roll gauze, tape and a tubular bandage.    (see assessment and photos below)    Pt also reports that he works and that when he goes home he does not have anyone to assist him with wound care post discharge. Discussed with pt that he may benefit from going to an outpatient wound care center to get further assistance with his wound. Left additional wound care supplies at bedside.    Recommendations:  -Nursing to cleanse wound to right elbow with sterile normal saline. Pack abscess wound lightly with aquacel ag rope with attention to packing wound @ 12 o'clock. Cover surround wound be area with aquacel ag and cover with gauze 4 x 4's and secure with roll gauze, tape and a tubular bandage. Dressing to be changed every 2 days and PRN if soiled or dislodged. Aquacel Ag is a silver impregnated that dressing kills wound bacteria held in the dressing. This dressing absorbs high amounts of wound fluid and bacteria and creates a soft, cohesive gel that  intimately conforms to the wound surface, maintains a moist environment and aids in the removal of non-viable tissue from the wound (autolytic debridement).  -Pt to follow up with an outpatient wound care center post discharge.    Discussed plan of care with pt, pt's nurse, and with Dr. Alvarado. Wound care will continue to follow pt PRN. [id number]         09/03/19 0936        Wound Abscess Elbow   No Date First Assessed or Time First Assessed found.   Primary Wound Type: Abscess  Side: Right  Location: Elbow   Wound Image    Wound WDL ex   Dressing Appearance Intact;Moist drainage   Drainage Amount Moderate   Drainage Characteristics/Odor Serosanguineous   Appearance Pink;Red;Tan;Slough;Ecchymotic;Granulating   Tissue loss description Full thickness   Periwound Area Edematous;Redness;Satellite lesion   Wound Edges Open   Wound Length (cm) 5 cm  (abscess area 1cm x 1 cm x 1 cm)   Wound Width (cm) 4 cm   Wound Depth (cm) 0.1 cm   Wound Volume (cm^3) 2 cm^3   Wound Surface Area (cm^2) 20 cm^2   Tunneling (depth (cm)/location)   (2.5 cm @ 12 o'clock)   Care Cleansed with:;Sterile normal saline   Dressing Applied;Changed;Hydrofiber;Silver;Gauze;Rolled gauze;Tubular bandage   Packing other (see comments)  (aquacel ag)   Packing Inserted  1   Packing Removed 1   Periwound Care Dry periwound area maintained   Dressing Change Due 09/05/19

## 2019-09-03 NOTE — PROGRESS NOTES
"[facility]  Orthopedics  Progress Note    Patient Name: Cristobal Ramirez  MRN: [id number]  Admission Date: 8/31/2019  Hospital Length of Stay: 3 days  Attending Provider: Rebecca Sands MD  Primary Care Provider: Judy Davenport MD    Subjective:     Principal Problem:Septic olecranon bursitis of right elbow    Principal Orthopedic Problem: same    Interval History: Pt seen and examined at bedside. Reports pain is controlled and not worsening. Bedside I&D yesterday.    Review of patient's allergies indicates:   Allergen Reactions    Pollen extracts        Current Facility-Administered Medications   Medication    acetaminophen tablet 650 mg    buPROPion TB24 tablet 300 mg    cefTRIAXone (ROCEPHIN) 2 g in dextrose 5 % 50 mL IVPB    dextrose 10% (D10W) Bolus    dextrose 10% (D10W) Bolus    emtricitabine-tenofovir 200-300 mg per tablet 1 tablet    enoxaparin injection 40 mg    FLUoxetine capsule 20 mg    glucagon (human recombinant) injection 1 mg    glucose chewable tablet 16 g    glucose chewable tablet 24 g    ondansetron disintegrating tablet 8 mg    oxyCODONE immediate release tablet 10 mg    oxyCODONE immediate release tablet 5 mg    promethazine tablet 25 mg    ramelteon tablet 8 mg    sodium chloride 0.9% flush 10 mL    traZODone tablet 100 mg    vancomycin 1.5 g in dextrose 5 % 250 mL IVPB (ready to mix)     Objective:     Vital Signs (Most Recent):  Temp: 97.6 °F (36.4 °C) (09/03/19 0427)  Pulse: 62 (09/03/19 0449)  Resp: 18 (09/03/19 0427)  BP: 102/65 (09/03/19 0449)  SpO2: (!) 94 % (09/03/19 0427) Vital Signs (24h Range):  Temp:  [97.4 °F (36.3 °C)-98 °F (36.7 °C)] 97.6 °F (36.4 °C)  Pulse:  [62-75] 62  Resp:  [16-20] 18  SpO2:  [94 %-97 %] 94 %  BP: ()/(51-76) 102/65     Weight: 82 kg (180 lb 12.4 oz)  Height: 5' 11" (180.3 cm)  Body mass index is 25.21 kg/m².      Intake/Output Summary (Last 24 hours) at 9/3/2019 0652  Last data filed at 9/3/2019 " 0400  Gross per 24 hour   Intake 800 ml   Output 250 ml   Net 550 ml       Ortho/SPM Exam      4x5 cm macerated, erythematous lesion over posterior elbow with packing in place  No significant drainage  Erythema of skin tracking medially along arm  TTP over olecranon bursa and wound  FROM shoulder, elbow and wrist. Painless with ROM.  SILT M/U/R  Motor intact AIN/PIN/M/U  Cap refill < 2s  2+ RP    Significant Labs:   CBC:   Recent Labs   Lab 09/02/19  0321   WBC 5.86   HGB 12.0*   HCT 36.8*        All pertinent labs within the past 24 hours have been reviewed.    Significant Imaging: I have reviewed and interpreted all pertinent imaging results/findings.    Assessment/Plan:     Olecranon bursa abscess, right  Pt is a 54 yo M with R septic olecranon bursitis.  Pt is hemodynamically stable, no concern for septic arthritis of elbow.  Bedside I&D yesterday with packing in place.  Continue abx.  F/u wound care recs.          Itz Mullins MD  Orthopedics  [facility]

## 2019-09-03 NOTE — PROGRESS NOTES
Progress Note  Hospital Medicine    Admit Date: 8/31/2019  Length of Stay:  LOS: 3 days     SUBJECTIVE:         Follow-up For:  Septic olecranon bursitis of right elbow    HPI/Interval history (See H&P for complete P,F,SHx) :     54 yo man with a past medical history of anxiety and depression.  He was in his regular state of health until about 5 days ago when he got a reddened area over his right elbow.  This progressively enlarged and became painful.  He has had good range of motion of the elbow and no fever.  He denies any recent trauma to the area.  He went to an urgent care and had an I&D performed and was sent to the ED.  His friend/roommate reports that he also had MRSA infections in the past few months as well.     Saw Nicole in ED- recommended IV antibitocs and wound care eval     Overview/Hospital Course:  9/1 - lesion red and draining, swollen, less pain.    9/2 - Wound is looking better, less erythemic, less swollen.  Ortho perform small extension of prior I&D incision today at bedside and thoroughly clean.  conitinue vanc.   09/03/2019- cultures with MRSA.  A.m. infectious disease consult s/p wound care evaluation          Review of Systems: List if applicable  Pain scale: Constitutional: Negative for chills, fatigue and fever.   HENT: Negative for congestion and trouble swallowing.    Respiratory: Negative for cough, choking, chest tightness and shortness of breath.    Cardiovascular: Negative for chest pain and leg swelling.   Gastrointestinal: Negative for abdominal pain.   Genitourinary: Negative for difficulty urinating.   Musculoskeletal: Negative for arthralgias, back pain and gait problem.   Skin: Positive for wound.   Neurological: Negative for dizziness, facial asymmetry, light-headedness and headaches.   Psychiatric/Behavioral: Negative for agitation, behavioral problems, confusion and decreased concentration.     OBJECTIVE:     Vital Signs Range (Last 24H):  Temp:  [97.2 °F (36.2 °C)-98 °F  (36.7 °C)]   Pulse:  [62-75]   Resp:  [17-20]   BP: ()/(51-81)   SpO2:  [94 %-97 %]     Physical Exam:  General- Patient alert and oriented x3   HEENT- PERRLA, EOMI, OP clear  Neck- No JVD, Lymphadenopathy, Thyromegaly  CV- Regular rate and rhythm, No Murmur/raghav/rubs  Resp- Lungs CTA Bilaterally, No increased WOB  Abdomen- Non tender/non-distended, BS normoactive x4 quads, no HSM  Extrem- No cyanosis, clubbing, edema. Right elbow dressing  Skin- No rashes, lesions, ulcers  Neuro- Strength 5/5 flexors/extensors,Intact sensation to light touch grossly      Medications:  Medication list was reviewed and changes noted under Assessment/Plan.      Current Facility-Administered Medications:     acetaminophen tablet 650 mg, 650 mg, Oral, Q4H PRN, ODILON Rust MD    buPROPion TB24 tablet 300 mg, 300 mg, Oral, Daily, ODILON Rust MD, 300 mg at 09/03/19 0800    cefTRIAXone (ROCEPHIN) 2 g in dextrose 5 % 50 mL IVPB, 2 g, Intravenous, Q24H, ODILON Rust MD, 2 g at 09/03/19 0008    dextrose 10% (D10W) Bolus, 12.5 g, Intravenous, PRN, ODILON Rust MD    dextrose 10% (D10W) Bolus, 25 g, Intravenous, PRN, ODILON Rust MD    emtricitabine-tenofovir 200-300 mg per tablet 1 tablet, 1 tablet, Oral, Daily, ODILON Rust MD    enoxaparin injection 40 mg, 40 mg, Subcutaneous, Daily, ODILON Rust MD, 40 mg at 09/03/19 1739    FLUoxetine capsule 20 mg, 20 mg, Oral, Daily, ODILON Rust MD, 20 mg at 09/03/19 0801    glucagon (human recombinant) injection 1 mg, 1 mg, Intramuscular, PRN, ODILON Ruts MD    glucose chewable tablet 16 g, 16 g, Oral, PRN, ODILON Rust MD    glucose chewable tablet 24 g, 24 g, Oral, PRN, W. Jayden Rust MD    ondansetron disintegrating tablet 8 mg, 8 mg, Oral, Q8H PRN, ODILON Rust MD    oxyCODONE immediate release tablet 10 mg, 10 mg, Oral, Q6H PRN, ODILON Rust MD    oxyCODONE immediate release tablet 5 mg, 5 mg, Oral, Q6H PRN, ODILON Rust,  MD, 5 mg at 09/02/19 1911    promethazine tablet 25 mg, 25 mg, Oral, Q6H PRN, ODILON Rust MD    ramelteon tablet 8 mg, 8 mg, Oral, Nightly PRN, ODILON Rust MD    sodium chloride 0.9% flush 10 mL, 10 mL, Intravenous, PRN, Denisha Quintero MD    traZODone tablet 100 mg, 100 mg, Oral, QHS, ODILON Rust MD, 100 mg at 09/02/19 2121    vancomycin 1.5 g in dextrose 5 % 250 mL IVPB (ready to mix), 1,500 mg, Intravenous, Q12H, Rebecca Sands MD, Last Rate: 166.7 mL/hr at 09/03/19 1504, 1,500 mg at 09/03/19 1504    acetaminophen, Dextrose 10% Bolus, Dextrose 10% Bolus, glucagon (human recombinant), glucose, glucose, ondansetron, oxyCODONE, oxyCODONE, promethazine, ramelteon, sodium chloride 0.9%    Laboratory/Diagnostic Data:  Reviewed and noted in plan where applicable- Please see chart for full lab data.    Recent Labs   Lab 09/01/19  0525 09/02/19  0321 09/03/19  0701   WBC 6.52 5.86 5.33   HGB 11.5* 12.0* 12.9*   HCT 36.6* 36.8* 39.3*    332 357*       Recent Labs   Lab 09/01/19  0524 09/02/19  0321 09/03/19  0701    143 142   K 3.8 4.0 4.1    110 108   CO2 26 23 24   BUN 14 13 10   CREATININE 0.8 0.8 0.8   GLU 95 80 89   CALCIUM 8.8 8.6* 9.2   MG 2.3 2.2 2.3   PHOS 3.4 4.4 4.1       Recent Labs   Lab 08/31/19 2004   ALKPHOS 78   ALT 14   AST 12   ALBUMIN 3.2*   PROT 7.1   BILITOT 0.2        Microbiology labs for the last week  Microbiology Results (last 7 days)     Procedure Component Value Units Date/Time    Aerobic culture [[id number]]  (Abnormal)  (Susceptibility) Collected:  08/31/19 2007    Order Status:  Completed Specimen:  Abscess from Elbow, Right Updated:  09/03/19 0934     Aerobic Bacterial Culture METHICILLIN RESISTANT STAPHYLOCOCCUS AUREUS  Moderate      Blood culture #1 **CANNOT BE ORDERED STAT** [[id number]] Collected:  08/31/19 1950    Order Status:  Completed Specimen:  Blood from Peripheral, Hand, Left Updated:  09/02/19 2212     Blood Culture, Routine No  "Growth to date      No Growth to date      No Growth to date    Blood culture #2 **CANNOT BE ORDERED STAT** [[id number]] Collected:  08/31/19 2004    Order Status:  Completed Specimen:  Blood from Peripheral, Antecubital, Left Updated:  09/02/19 2212     Blood Culture, Routine No Growth to date      No Growth to date      No Growth to date           Imaging Results          X-Ray Elbow Complete Right (Final result)  Result time 08/31/19 21:33:28    Final result by Vincent Zapata MD (08/31/19 21:33:28)             Impression:      No acute fracture.      Electronically signed by: Vincent Zapata MD  Date:    08/31/2019  Time:    21:33           Narrative:    EXAMINATION:  XR ELBOW COMPLETE 3 VIEW RIGHT    CLINICAL HISTORY:  . Effusion, right elbow    TECHNIQUE:  AP, lateral, and radial head views of the right elbow were performed.    COMPARISON:  None    FINDINGS:  No fracture or dislocation.  No fat pad elevation.  Cartilage spaces are maintained.  Soft tissues are unremarkable.                              Estimated body mass index is 25.21 kg/m² as calculated from the following:    Height as of this encounter: 5' 11" (1.803 m).    Weight as of this encounter: 82 kg (180 lb 12.4 oz).    I & O (Last 24H):    Intake/Output Summary (Last 24 hours) at 9/3/2019 1757  Last data filed at 9/3/2019 1700  Gross per 24 hour   Intake 800 ml   Output 850 ml   Net -50 ml       Estimated Creatinine Clearance: 113.7 mL/min (based on SCr of 0.8 mg/dL).    ASSESSMENT/PLAN:     Active Problems:    Active Hospital Problems    Diagnosis  POA    *Septic olecranon bursitis of right elbow [M71.121]   54 yo M with R septic olecranon bursitis.   Elevated CRP and drainage from bursa.   no concern for septic arthritis of elbow.  Recommend IV abx  - on rocephin and vanc  Blood culture NGSF  Wound culture - MRSA  wound care consulted - pending  No acute orthopedic intervention at this time.  Ortho following  non adhesive telfa dressing, " bulky 4x4 over elbow and compressive ace wrap.      9/2 - ortho to I&D today at bedside - continue rocephin and vanc. vanc trough 7.8 pharmacy adjusting dose- monitor for toxicity   09/03/2019- infectious Disease consulted.  Started on contact isolation       Yes    Abscess of bursa of right elbow [M71.021] as below  Yes    Anemia [D64.9] hemoglobin 12.9.  Normocytic monitor.  Stable  Unknown    Right arm cellulitis [L03.113]9/1 - improving in first 12 hours  9/2 - nearly rsolved  Yes    Olecranon bursa abscess, right [M71.021] status post orthopedic evaluation.  Incision and drainage done on 09/02/2019  Yes    Anxiety [F41.9]controllled on bupropion and fluoxetine  Yes      Resolved Hospital Problems   No resolved problems to display.         Disposition- home    DVT prophylaxis addressed with:  Early ambulation            Anthony Alvarado MD  Attending Staff Physician  [facility]  pager- [phone] [facility] - [phone]

## 2019-09-03 NOTE — SUBJECTIVE & OBJECTIVE
"Principal Problem:Septic olecranon bursitis of right elbow    Principal Orthopedic Problem: same    Interval History: Pt seen and examined at bedside. Reports pain is controlled and not worsening. Bedside I&D yesterday.    Review of patient's allergies indicates:   Allergen Reactions    Pollen extracts        Current Facility-Administered Medications   Medication    acetaminophen tablet 650 mg    buPROPion TB24 tablet 300 mg    cefTRIAXone (ROCEPHIN) 2 g in dextrose 5 % 50 mL IVPB    dextrose 10% (D10W) Bolus    dextrose 10% (D10W) Bolus    emtricitabine-tenofovir 200-300 mg per tablet 1 tablet    enoxaparin injection 40 mg    FLUoxetine capsule 20 mg    glucagon (human recombinant) injection 1 mg    glucose chewable tablet 16 g    glucose chewable tablet 24 g    ondansetron disintegrating tablet 8 mg    oxyCODONE immediate release tablet 10 mg    oxyCODONE immediate release tablet 5 mg    promethazine tablet 25 mg    ramelteon tablet 8 mg    sodium chloride 0.9% flush 10 mL    traZODone tablet 100 mg    vancomycin 1.5 g in dextrose 5 % 250 mL IVPB (ready to mix)     Objective:     Vital Signs (Most Recent):  Temp: 97.6 °F (36.4 °C) (09/03/19 0427)  Pulse: 62 (09/03/19 0449)  Resp: 18 (09/03/19 0427)  BP: 102/65 (09/03/19 0449)  SpO2: (!) 94 % (09/03/19 0427) Vital Signs (24h Range):  Temp:  [97.4 °F (36.3 °C)-98 °F (36.7 °C)] 97.6 °F (36.4 °C)  Pulse:  [62-75] 62  Resp:  [16-20] 18  SpO2:  [94 %-97 %] 94 %  BP: ()/(51-76) 102/65     Weight: 82 kg (180 lb 12.4 oz)  Height: 5' 11" (180.3 cm)  Body mass index is 25.21 kg/m².      Intake/Output Summary (Last 24 hours) at 9/3/2019 0652  Last data filed at 9/3/2019 0400  Gross per 24 hour   Intake 800 ml   Output 250 ml   Net 550 ml       Ortho/SPM Exam      4x5 cm macerated, erythematous lesion over posterior elbow with packing in place  No significant drainage  Erythema of skin tracking medially along arm  TTP over olecranon bursa and " wound  FROM shoulder, elbow and wrist. Painless with ROM.  SILT M/U/R  Motor intact AIN/PIN/M/U  Cap refill < 2s  2+ RP    Significant Labs:   CBC:   Recent Labs   Lab 09/02/19  0321   WBC 5.86   HGB 12.0*   HCT 36.8*        All pertinent labs within the past 24 hours have been reviewed.    Significant Imaging: I have reviewed and interpreted all pertinent imaging results/findings.

## 2019-09-03 NOTE — PLAN OF CARE
Problem: Adult Inpatient Plan of Care  Goal: Plan of Care Review  Outcome: Ongoing (interventions implemented as appropriate)  Patient was monitored for changes in vital signs, physical assessments, and pain. Patient kept arm elavated. Patient denies pain. Patient kept bed in lowest position. Patient denied pain and is able to voice concerns. Patient was placed on contact isolation.

## 2019-09-03 NOTE — ASSESSMENT & PLAN NOTE
Pt is a 54 yo M with R septic olecranon bursitis.  Pt is hemodynamically stable, no concern for septic arthritis of elbow.  Bedside I&D yesterday with packing in place.  Continue abx.  F/u wound care recs.

## 2019-09-03 NOTE — PLAN OF CARE
met patient to obtain discharge planning assessment.  Information obtained from patient.  Patient's transportation home will be provided by his sister via personal vehicle.   name and number written on the board at the bedside.    Judy Davenport MD       [id number] - [place], [place] - [address] AT [facility] [address]  [address]  [facility] [address]  Phone: [phone] Fax: [phone]    Payor: [employer] [facility] / Plan: [facility] LOCAL PLUS / Product Type: Commercial /       09/03/19 1215   Discharge Assessment   Assessment Type Discharge Planning Assessment   Confirmed/corrected address and phone number on facesheet? Yes   Assessment information obtained from? Patient   Expected Length of Stay (days) 5   Communicated expected length of stay with patient/caregiver yes   Prior to hospitilization cognitive status: Alert/Oriented   Prior to hospitalization functional status: Independent   Current cognitive status: Alert/Oriented   Current Functional Status: Independent   Lives With alone   Able to Return to Prior Arrangements yes   Is patient able to care for self after discharge? Yes   Who are your caregiver(s) and their phone number(s)? Krishan Ramirez-Sister       [phone]   Patient's perception of discharge disposition home or selfcare   Readmission Within the Last 30 Days no previous admission in last 30 days   Patient currently being followed by outpatient case management? No   Patient currently receives any other outside agency services? No   Equipment Currently Used at Home none   Do you have any problems affording any of your prescribed medications? No   Is the patient taking medications as prescribed? yes   Does the patient have transportation home? Yes   Transportation Anticipated family or friend will provide   Dialysis Name and Scheduled days N/A   Does the patient receive services at the  [facility]? No   Discharge Plan A Home   Discharge Plan B Home with family;Home Health   DME Needed Upon Discharge    (TBD)   Patient/Family in Agreement with Plan yes

## 2019-09-04 VITALS
SYSTOLIC BLOOD PRESSURE: 130 MMHG | BODY MASS INDEX: 26.73 KG/M2 | HEART RATE: 65 BPM | TEMPERATURE: 98 F | RESPIRATION RATE: 18 BRPM | OXYGEN SATURATION: 99 % | DIASTOLIC BLOOD PRESSURE: 65 MMHG | WEIGHT: 190.94 LBS | HEIGHT: 71 IN

## 2019-09-04 DIAGNOSIS — L03.90 CELLULITIS, UNSPECIFIED CELLULITIS SITE: Primary | ICD-10-CM

## 2019-09-04 PROBLEM — M71.021 OLECRANON BURSA ABSCESS, RIGHT: Status: RESOLVED | Noted: 2019-08-31 | Resolved: 2019-09-04

## 2019-09-04 PROBLEM — M71.021 ABSCESS OF BURSA OF RIGHT ELBOW: Status: RESOLVED | Noted: 2019-09-03 | Resolved: 2019-09-04

## 2019-09-04 LAB
ANION GAP SERPL CALC-SCNC: 10 MMOL/L (ref 8–16)
BASOPHILS # BLD AUTO: 0.07 K/UL (ref 0–0.2)
BASOPHILS NFR BLD: 1.3 % (ref 0–1.9)
BUN SERPL-MCNC: 11 MG/DL (ref 6–20)
CALCIUM SERPL-MCNC: 8.5 MG/DL (ref 8.7–10.5)
CHLORIDE SERPL-SCNC: 108 MMOL/L (ref 95–110)
CO2 SERPL-SCNC: 24 MMOL/L (ref 23–29)
CREAT SERPL-MCNC: 0.9 MG/DL (ref 0.5–1.4)
DIFFERENTIAL METHOD: ABNORMAL
EOSINOPHIL # BLD AUTO: 0.2 K/UL (ref 0–0.5)
EOSINOPHIL NFR BLD: 3.4 % (ref 0–8)
ERYTHROCYTE [DISTWIDTH] IN BLOOD BY AUTOMATED COUNT: 11.2 % (ref 11.5–14.5)
EST. GFR  (AFRICAN AMERICAN): >60 ML/MIN/1.73 M^2
EST. GFR  (NON AFRICAN AMERICAN): >60 ML/MIN/1.73 M^2
GLUCOSE SERPL-MCNC: 96 MG/DL (ref 70–110)
HCT VFR BLD AUTO: 36.6 % (ref 40–54)
HGB BLD-MCNC: 11.7 G/DL (ref 14–18)
IMM GRANULOCYTES # BLD AUTO: 0.03 K/UL (ref 0–0.04)
IMM GRANULOCYTES NFR BLD AUTO: 0.5 % (ref 0–0.5)
LYMPHOCYTES # BLD AUTO: 2.1 K/UL (ref 1–4.8)
LYMPHOCYTES NFR BLD: 38.6 % (ref 18–48)
MAGNESIUM SERPL-MCNC: 2.2 MG/DL (ref 1.6–2.6)
MCH RBC QN AUTO: 31 PG (ref 27–31)
MCHC RBC AUTO-ENTMCNC: 32 G/DL (ref 32–36)
MCV RBC AUTO: 97 FL (ref 82–98)
MONOCYTES # BLD AUTO: 0.7 K/UL (ref 0.3–1)
MONOCYTES NFR BLD: 13.2 % (ref 4–15)
NEUTROPHILS # BLD AUTO: 2.4 K/UL (ref 1.8–7.7)
NEUTROPHILS NFR BLD: 43 % (ref 38–73)
NRBC BLD-RTO: 0 /100 WBC
PHOSPHATE SERPL-MCNC: 4.2 MG/DL (ref 2.7–4.5)
PLATELET # BLD AUTO: 316 K/UL (ref 150–350)
PMV BLD AUTO: 9.3 FL (ref 9.2–12.9)
POTASSIUM SERPL-SCNC: 3.5 MMOL/L (ref 3.5–5.1)
RBC # BLD AUTO: 3.77 M/UL (ref 4.6–6.2)
SODIUM SERPL-SCNC: 142 MMOL/L (ref 136–145)
VANCOMYCIN TROUGH SERPL-MCNC: 12.9 UG/ML (ref 10–22)
WBC # BLD AUTO: 5.52 K/UL (ref 3.9–12.7)

## 2019-09-04 PROCEDURE — 83735 ASSAY OF MAGNESIUM: CPT

## 2019-09-04 PROCEDURE — 99239 PR HOSPITAL DISCHARGE DAY,>30 MIN: ICD-10-PCS | Mod: ,,, | Performed by: HOSPITALIST

## 2019-09-04 PROCEDURE — 80202 ASSAY OF VANCOMYCIN: CPT

## 2019-09-04 PROCEDURE — 99239 HOSP IP/OBS DSCHRG MGMT >30: CPT | Mod: ,,, | Performed by: HOSPITALIST

## 2019-09-04 PROCEDURE — 36415 COLL VENOUS BLD VENIPUNCTURE: CPT

## 2019-09-04 PROCEDURE — 63600175 PHARM REV CODE 636 W HCPCS: Performed by: INTERNAL MEDICINE

## 2019-09-04 PROCEDURE — 99223 1ST HOSP IP/OBS HIGH 75: CPT | Mod: ,,, | Performed by: INTERNAL MEDICINE

## 2019-09-04 PROCEDURE — 84100 ASSAY OF PHOSPHORUS: CPT

## 2019-09-04 PROCEDURE — 99223 PR INITIAL HOSPITAL CARE,LEVL III: ICD-10-PCS | Mod: ,,, | Performed by: INTERNAL MEDICINE

## 2019-09-04 PROCEDURE — G0378 HOSPITAL OBSERVATION PER HR: HCPCS

## 2019-09-04 PROCEDURE — 63600175 PHARM REV CODE 636 W HCPCS: Performed by: HOSPITALIST

## 2019-09-04 PROCEDURE — 80048 BASIC METABOLIC PNL TOTAL CA: CPT

## 2019-09-04 PROCEDURE — 25000003 PHARM REV CODE 250: Performed by: INTERNAL MEDICINE

## 2019-09-04 PROCEDURE — 85025 COMPLETE CBC W/AUTO DIFF WBC: CPT

## 2019-09-04 RX ORDER — SULFAMETHOXAZOLE AND TRIMETHOPRIM 800; 160 MG/1; MG/1
1 TABLET ORAL 2 TIMES DAILY
Qty: 28 TABLET | Refills: 0 | Status: SHIPPED | OUTPATIENT
Start: 2019-09-04 | End: 2019-09-18

## 2019-09-04 RX ORDER — SULFAMETHOXAZOLE AND TRIMETHOPRIM 800; 160 MG/1; MG/1
1 TABLET ORAL 2 TIMES DAILY
Status: DISCONTINUED | OUTPATIENT
Start: 2019-09-04 | End: 2019-09-04 | Stop reason: HOSPADM

## 2019-09-04 RX ADMIN — FLUOXETINE 20 MG: 20 CAPSULE ORAL at 10:09

## 2019-09-04 RX ADMIN — CEFTRIAXONE 2 G: 2 INJECTION, SOLUTION INTRAVENOUS at 12:09

## 2019-09-04 RX ADMIN — VANCOMYCIN HYDROCHLORIDE 1500 MG: 1.5 INJECTION, POWDER, LYOPHILIZED, FOR SOLUTION INTRAVENOUS at 02:09

## 2019-09-04 RX ADMIN — BUPROPION HYDROCHLORIDE 300 MG: 300 TABLET, FILM COATED, EXTENDED RELEASE ORAL at 10:09

## 2019-09-04 NOTE — ASSESSMENT & PLAN NOTE
54 yo man presents with right elbow pain +spetic bursitis of right elbow s/p I&D. cultures +MRSA. Per ortho, joint involvement    1. D/c vancomycin, started bactrim 1 DS po bid x 14 days.  2. Discuss risk of ALECIA, hyperkalemia, and rash. all questions answered. Pt verbalized understanding and to discontinue if with any of the above reactions.   3. Will need weekly cbc cmp sent to ID clinic at   4. Will need f/u with ID to determine length of abx therapy if need to extend   5. Discussed with orthopedics and primary team.   6. Pt on truvada for PREP. HIV testing 9/1 negative. F/u with ID outpatient.

## 2019-09-04 NOTE — CONSULTS
[facility]  Infectious Disease  Consult Note    Patient Name: Cristobal Ramirez  MRN: [id number]  Admission Date: 8/31/2019  Hospital Length of Stay: 4 days  Attending Physician: Anthony Alvarado MD  Primary Care Provider: Judy Davenport MD     Isolation Status: Contact    Patient information was obtained from past medical records and ER records.      Consults  Assessment/Plan:     * Septic olecranon bursitis of right elbow  52 yo man presents with right elbow pain +spetic bursitis of right elbow s/p I&D. cultures +MRSA. Per ortho, joint involvement    1. D/c vancomycin, started bactrim 1 DS po bid x 14 days.  2. Discuss risk of ALECIA, hyperkalemia, and rash. all questions answered. Pt verbalized understanding and to discontinue if with any of the above reactions.   3. Will need weekly cbc cmp sent to ID clinic at   4. Will need f/u with ID to determine length of abx therapy if need to extend   5. Discussed with orthopedics and primary team.   6. Pt on truvada for PREP. HIV testing 9/1 negative. F/u with ID outpatient.         Thank you for your consult. I will sign off. Please contact us if you have any additional questions.    Juvenal Mead PA-C  Infectious Disease  [facility]    Subjective:     Principal Problem: Septic olecranon bursitis of right elbow    HPI: 52 yo man with a past medical history of anxiety and depression.  He was in his regular state of health until about 5 days ago when he got a reddened area over his right elbow.  This progressively enlarged and became painful.  He has had good range of motion of the elbow and no fever.  He denies any recent trauma to the area.  He went to an urgent care and had an I&D performed and was sent to the ED.  His friend/roommate reports that he also had MRSA infections in the past few months as well. He underwent I&D by orthopedics. Cultures + MRSA. He is on vancomycin and ceftriaxone. No fever  chills or night sweats    Past Medical History:   Diagnosis Date    Allergy     Anxiety     Depression        Past Surgical History:   Procedure Laterality Date    COLONOSCOPY N/A 10/24/2016    Performed by [facility]field MD at [facility] (4TH FLR)    COLONOSCOPY N/A 9/16/2016    Performed by Dalton Lane MD at [facility] (4TH FLR)    RHINOPLASTY TIP  1985       Review of patient's allergies indicates:   Allergen Reactions    Pollen extracts        Medications:  Medications Prior to Admission   Medication Sig    buPROPion (WELLBUTRIN XL) 300 MG 24 hr tablet Take 1 tablet (300 mg total) by mouth once daily.    FLUoxetine (PROZAC) 20 MG capsule TAKE 1 CAPSULE(20 MG) BY MOUTH EVERY DAY    traZODone (DESYREL) 100 MG tablet Take 1 tablet (100 mg total) by mouth every evening.    TRUVADA 200-300 mg Tab TK 1 T PO  D    [DISCONTINUED] buPROPion (WELLBUTRIN XL) 300 MG 24 hr tablet TAKE 1 TABLET BY MOUTH DAILY    [DISCONTINUED] FLUoxetine (PROZAC) 20 MG capsule Take 1 capsule (20 mg total) by mouth once daily.    [DISCONTINUED] FLUoxetine 20 MG capsule TAKE 1 CAPSULE BY MOUTH ONCE DAILY    [DISCONTINUED] traZODone (DESYREL) 100 MG tablet TAKE 2 TABLETS(200 MG) BY MOUTH EVERY NIGHT AS NEEDED FOR INSOMNIA     Antibiotics (From admission, onward)    Start     Stop Route Frequency Ordered    09/04/19 2100  sulfamethoxazole-trimethoprim 800-160mg per tablet 1 tablet      -- Oral 2 times daily 09/04/19 1635        Antifungals (From admission, onward)    None        Antivirals (From admission, onward)        Stop Route Frequency     emtricitabine-tenofovir 200-300 mg      -- Oral Daily           Immunization History   Administered Date(s) Administered    Influenza 12/08/2015    Influenza - Quadrivalent - PF (6 months and older) 12/12/2012, 08/29/2013, 10/03/2018    Influenza - Trivalent (ADULT) 12/08/2015    Influenza A (H1N1) 2009 Monovalent - IM 12/09/2009    Influenza Split 12/12/2012    Pneumococcal  Polysaccharide - 23 Valent 08/29/2013    Tdap 04/26/2016       Family History     None        Social History     Socioeconomic History    Marital status: Single     Spouse name: Not on file    Number of children: Not on file    Years of education: Not on file    Highest education level: Not on file   Occupational History     Employer: [employer]   Social Needs    Financial resource strain: Not on file    Food insecurity:     Worry: Not on file     Inability: Not on file    Transportation needs:     Medical: Not on file     Non-medical: Not on file   Tobacco Use    Smoking status: Current Every Day Smoker     Packs/day: 1.00     Types: Cigarettes    Smokeless tobacco: Never Used   Substance and Sexual Activity    Alcohol use: No    Drug use: No    Sexual activity: Yes     Partners: Male     Birth control/protection: None   Lifestyle    Physical activity:     Days per week: Not on file     Minutes per session: Not on file    Stress: Not on file   Relationships    Social connections:     Talks on phone: Not on file     Gets together: Not on file     Attends Rastafarian service: Not on file     Active member of club or organization: Not on file     Attends meetings of clubs or organizations: Not on file     Relationship status: Not on file   Other Topics Concern    Not on file   Social History Narrative    Not on file     Review of Systems   Constitutional: Negative for chills, diaphoresis, fatigue and fever.   Respiratory: Negative for cough and shortness of breath.    Cardiovascular: Negative for chest pain.   Gastrointestinal: Negative for abdominal pain.   Genitourinary: Negative for dysuria.   Musculoskeletal: Positive for arthralgias and joint swelling. Negative for back pain, gait problem, myalgias, neck pain and neck stiffness.   Skin: Positive for wound. Negative for pallor and rash.   All other systems reviewed and are negative.    Objective:     Vital Signs (Most Recent):  Temp:  97.5 °F (36.4 °C) (09/04/19 1500)  Pulse: 65 (09/04/19 1500)  Resp: 18 (09/04/19 1500)  BP: 130/65 (09/04/19 1500)  SpO2: 99 % (09/04/19 1500) Vital Signs (24h Range):  Temp:  [97.3 °F (36.3 °C)-98 °F (36.7 °C)] 97.5 °F (36.4 °C)  Pulse:  [61-73] 65  Resp:  [16-19] 18  SpO2:  [92 %-100 %] 99 %  BP: (121-149)/(63-76) 130/65     Weight: 86.6 kg (190 lb 14.7 oz)  Body mass index is 26.63 kg/m².    Estimated Creatinine Clearance: 101.1 mL/min (based on SCr of 0.9 mg/dL).    Physical Exam   Constitutional: He appears well-developed and well-nourished. No distress.   HENT:   Head: Normocephalic.   Cardiovascular: Normal rate, regular rhythm and normal heart sounds.   No murmur heard.  Pulmonary/Chest: Effort normal. No respiratory distress.   Abdominal: Soft. He exhibits no distension.   Musculoskeletal: Normal range of motion. He exhibits edema. He exhibits no tenderness.   Right arm bursa with associated redness and swelling. No active drainage   Neurological: He is alert.   Skin: He is not diaphoretic.   Vitals reviewed.      Significant Labs:   CBC:   Recent Labs   Lab 09/03/19  0701 09/04/19  0253   WBC 5.33 5.52   HGB 12.9* 11.7*   HCT 39.3* 36.6*   * 316     CMP:   Recent Labs   Lab 09/03/19  0701 09/04/19  0253    142   K 4.1 3.5    108   CO2 24 24   GLU 89 96   BUN 10 11   CREATININE 0.8 0.9   CALCIUM 9.2 8.5*   ANIONGAP 10 10   EGFRNONAA >60.0 >60.0     Urine Culture: No results for input(s): LABURIN in the last 4320 hours.  Urine Studies: No results for input(s): COLORU, APPEARANCEUA, PHUR, SPECGRAV, PROTEINUA, GLUCUA, KETONESU, BILIRUBINUA, OCCULTUA, NITRITE, UROBILINOGEN, LEUKOCYTESUR, RBCUA, WBCUA, BACTERIA, SQUAMEPITHEL, HYALINECASTS in the last 4320 hours.    Invalid input(s): PEARL  Wound Culture:   Recent Labs   Lab 08/31/19 2007   LABAERO METHICILLIN RESISTANT STAPHYLOCOCCUS AUREUS  Moderate  *     All pertinent labs within the past 24 hours have been reviewed.    Significant  Imaging: I have reviewed all pertinent imaging results/findings within the past 24 hours.

## 2019-09-04 NOTE — CONSULTS
[facility]  Infectious Disease  Consult Note    Patient Name: Cristobal Ramirez  MRN: [id number]  Admission Date: 8/31/2019  Hospital Length of Stay: 4 days  Attending Physician: Anthony Alvarado MD  Primary Care Provider: Judy Davenport MD     Isolation Status: Contact    Inpatient consult to Infectious Diseases  Consult performed by: Juvenal Mead PA-C  Consult ordered by: Anthony Alvarado MD        ID consult received. Chart being reviewed. Full note with recommendations to follow.    Thank you,  Juvenal Mead PA-C  [phone]

## 2019-09-04 NOTE — HPI
54 yo man with a past medical history of anxiety and depression.  He was in his regular state of health until about 5 days ago when he got a reddened area over his right elbow.  This progressively enlarged and became painful.  He has had good range of motion of the elbow and no fever.  He denies any recent trauma to the area.  He went to an urgent care and had an I&D performed and was sent to the ED.  His friend/roommate reports that he also had MRSA infections in the past few months as well. He underwent I&D by orthopedics. Cultures + MRSA. He is on vancomycin and ceftriaxone. No fever chills or night sweats

## 2019-09-04 NOTE — SUBJECTIVE & OBJECTIVE
Past Medical History:   Diagnosis Date    Allergy     Anxiety     Depression        Past Surgical History:   Procedure Laterality Date    COLONOSCOPY N/A 10/24/2016    Performed by [facility] at [facility] (4TH FLR)    COLONOSCOPY N/A 9/16/2016    Performed by Dalton Lane MD at [facility] (4TH FLR)    RHINOPLASTY TIP  1985       Review of patient's allergies indicates:   Allergen Reactions    Pollen extracts        Medications:  Medications Prior to Admission   Medication Sig    buPROPion (WELLBUTRIN XL) 300 MG 24 hr tablet Take 1 tablet (300 mg total) by mouth once daily.    FLUoxetine (PROZAC) 20 MG capsule TAKE 1 CAPSULE(20 MG) BY MOUTH EVERY DAY    traZODone (DESYREL) 100 MG tablet Take 1 tablet (100 mg total) by mouth every evening.    TRUVADA 200-300 mg Tab TK 1 T PO  D    [DISCONTINUED] buPROPion (WELLBUTRIN XL) 300 MG 24 hr tablet TAKE 1 TABLET BY MOUTH DAILY    [DISCONTINUED] FLUoxetine (PROZAC) 20 MG capsule Take 1 capsule (20 mg total) by mouth once daily.    [DISCONTINUED] FLUoxetine 20 MG capsule TAKE 1 CAPSULE BY MOUTH ONCE DAILY    [DISCONTINUED] traZODone (DESYREL) 100 MG tablet TAKE 2 TABLETS(200 MG) BY MOUTH EVERY NIGHT AS NEEDED FOR INSOMNIA     Antibiotics (From admission, onward)    Start     Stop Route Frequency Ordered    09/04/19 2100  sulfamethoxazole-trimethoprim 800-160mg per tablet 1 tablet      -- Oral 2 times daily 09/04/19 1635        Antifungals (From admission, onward)    None        Antivirals (From admission, onward)        Stop Route Frequency     emtricitabine-tenofovir 200-300 mg      -- Oral Daily           Immunization History   Administered Date(s) Administered    Influenza 12/08/2015    Influenza - Quadrivalent - PF (6 months and older) 12/12/2012, 08/29/2013, 10/03/2018    Influenza - Trivalent (ADULT) 12/08/2015    Influenza A (H1N1) 2009 Monovalent - IM 12/09/2009    Influenza Split 12/12/2012    Pneumococcal Polysaccharide - 23 Valent  08/29/2013    Tdap 04/26/2016       Family History     None        Social History     Socioeconomic History    Marital status: Single     Spouse name: Not on file    Number of children: Not on file    Years of education: Not on file    Highest education level: Not on file   Occupational History     Employer: [employer]   Social Needs    Financial resource strain: Not on file    Food insecurity:     Worry: Not on file     Inability: Not on file    Transportation needs:     Medical: Not on file     Non-medical: Not on file   Tobacco Use    Smoking status: Current Every Day Smoker     Packs/day: 1.00     Types: Cigarettes    Smokeless tobacco: Never Used   Substance and Sexual Activity    Alcohol use: No    Drug use: No    Sexual activity: Yes     Partners: Male     Birth control/protection: None   Lifestyle    Physical activity:     Days per week: Not on file     Minutes per session: Not on file    Stress: Not on file   Relationships    Social connections:     Talks on phone: Not on file     Gets together: Not on file     Attends Restoration service: Not on file     Active member of club or organization: Not on file     Attends meetings of clubs or organizations: Not on file     Relationship status: Not on file   Other Topics Concern    Not on file   Social History Narrative    Not on file     Review of Systems   Constitutional: Negative for chills, diaphoresis, fatigue and fever.   Respiratory: Negative for cough and shortness of breath.    Cardiovascular: Negative for chest pain.   Gastrointestinal: Negative for abdominal pain.   Genitourinary: Negative for dysuria.   Musculoskeletal: Positive for arthralgias and joint swelling. Negative for back pain, gait problem, myalgias, neck pain and neck stiffness.   Skin: Positive for wound. Negative for pallor and rash.   All other systems reviewed and are negative.    Objective:     Vital Signs (Most Recent):  Temp: 97.5 °F (36.4 °C)  (09/04/19 1500)  Pulse: 65 (09/04/19 1500)  Resp: 18 (09/04/19 1500)  BP: 130/65 (09/04/19 1500)  SpO2: 99 % (09/04/19 1500) Vital Signs (24h Range):  Temp:  [97.3 °F (36.3 °C)-98 °F (36.7 °C)] 97.5 °F (36.4 °C)  Pulse:  [61-73] 65  Resp:  [16-19] 18  SpO2:  [92 %-100 %] 99 %  BP: (121-149)/(63-76) 130/65     Weight: 86.6 kg (190 lb 14.7 oz)  Body mass index is 26.63 kg/m².    Estimated Creatinine Clearance: 101.1 mL/min (based on SCr of 0.9 mg/dL).    Physical Exam   Constitutional: He appears well-developed and well-nourished. No distress.   HENT:   Head: Normocephalic.   Cardiovascular: Normal rate, regular rhythm and normal heart sounds.   No murmur heard.  Pulmonary/Chest: Effort normal. No respiratory distress.   Abdominal: Soft. He exhibits no distension.   Musculoskeletal: Normal range of motion. He exhibits edema. He exhibits no tenderness.   Right arm bursa with associated redness and swelling. No active drainage   Neurological: He is alert.   Skin: He is not diaphoretic.   Vitals reviewed.      Significant Labs:   CBC:   Recent Labs   Lab 09/03/19  0701 09/04/19  0253   WBC 5.33 5.52   HGB 12.9* 11.7*   HCT 39.3* 36.6*   * 316     CMP:   Recent Labs   Lab 09/03/19  0701 09/04/19  0253    142   K 4.1 3.5    108   CO2 24 24   GLU 89 96   BUN 10 11   CREATININE 0.8 0.9   CALCIUM 9.2 8.5*   ANIONGAP 10 10   EGFRNONAA >60.0 >60.0     Urine Culture: No results for input(s): LABURIN in the last 4320 hours.  Urine Studies: No results for input(s): COLORU, APPEARANCEUA, PHUR, SPECGRAV, PROTEINUA, GLUCUA, KETONESU, BILIRUBINUA, OCCULTUA, NITRITE, UROBILINOGEN, LEUKOCYTESUR, RBCUA, WBCUA, BACTERIA, SQUAMEPITHEL, HYALINECASTS in the last 4320 hours.    Invalid input(s): PEARL  Wound Culture:   Recent Labs   Lab 08/31/19 2007   LABAERO METHICILLIN RESISTANT STAPHYLOCOCCUS AUREUS  Moderate  *     All pertinent labs within the past 24 hours have been reviewed.    Significant Imaging: I have  reviewed all pertinent imaging results/findings within the past 24 hours.

## 2019-09-04 NOTE — PROGRESS NOTES
"Written and verbal discharge instructions given with "teach back". Elbow dressing removed and re-dressed per order. IV discontinued with catheter intact.Hemostasis obtained and pressure dressing applied. All belongings with patient. Ambulatory off unit per request.  "

## 2019-09-04 NOTE — DISCHARGE SUMMARY
[facility] Hospital Medicine  Discharge Summary      Patient Name: Cristobal Ramirez  MRN: [id number]  Admission Date: 8/31/2019  Hospital Length of Stay: 4 days  Discharge Date and Time:  09/04/2019 4:11 PM  Attending Physician: Anthony Alvarado MD   Discharging Provider: Anthony Alvarado MD  Primary Care Provider: Judy Davenport MD    Hospital Medicine Team: [facility] HOSP MED B Anthony Alvarado MD    HPI: 52 yo man with a past medical history of anxiety and depression.  He was in his regular state of health until about 5 days ago when he got a reddened area over his right elbow.  This progressively enlarged and became painful.  He has had good range of motion of the elbow and no fever.  He denies any recent trauma to the area.  He went to an urgent care and had an I&D performed and was sent to the ED.  His friend/roommate reports that he also had MRSA infections in the past few months as well.     Saw [place] in ED- recommended IV antibitocs and wound care eval     Overview/Hospital Course:  9/1 - lesion red and draining, swollen, less pain.    9/2 - Wound is looking better, less erythemic, less swollen.  Ortho perform small extension of prior I&D incision today at bedside and thoroughly clean.  conitinue vanc.   09/03/2019- cultures with MRSA.  A.m. infectious disease consult s/p wound care evaluation       * No surgery found *      Hospital Course:   Active Hospital Problems     Diagnosis   POA    *Septic olecranon bursitis of right elbow [M71.121]   52 yo M with R septic olecranon bursitis.   Elevated CRP and drainage from bursa.   no concern for septic arthritis of elbow.  Recommend IV abx  - on rocephin and vanc  Blood culture NGSF  Wound culture - MRSA  wound care consulted - pending  No acute orthopedic intervention at this time.  Ortho following  non adhesive telfa dressing, bulky 4x4 over elbow and compressive ace wrap.      9/2 - ortho to I&D today at bedside - continue  rocephin and vanc. vanc trough 7.8 pharmacy adjusting dose- monitor for toxicity   09/03/2019- infectious Disease consulted.  Started on contact isolation  09/04/2019  Status post Infectious Disease eval.  Recommending Bactrim DS every 12 hourly for 14 days.  Obtain CBC CMP ESR/CRP weekly    Nursing to cleanse wound to right elbow with sterile normal saline. Pack abscess wound lightly with aquacel ag rope with attention to packing wound @ 12 o'clock. Cover surround wound be area with aquacel ag and cover with gauze 4 x 4's and secure with roll gauze, tape and a tubular bandage. Dressing to be changed every 2 days and PRN if soiled or dislodge         Yes    Abscess of bursa of right elbow [M71.021] as below   Yes    Anemia [D64.9] hemoglobin 12.9.  Normocytic monitor.  Stable   Unknown    Right arm cellulitis [L03.113]9/1 - improving in first 12 hours  9/2 - nearly rsolved   Yes    Olecranon bursa abscess, right [M71.021] status post orthopedic evaluation.  Incision and drainage done on 09/02/2019   Yes    Anxiety [F41.9]controllled on bupropion and fluoxetine   Yes         Patient being discharged home with Infectious Disease and wound care follow-up    Consults:   Consults (From admission, onward)        Status Ordering Provider     Inpatient consult to Infectious Diseases  Once     Provider:  (Not yet assigned)    LESLEY Jo     Inpatient consult to Orthopedic Surgery  Once     Provider:  (Not yet assigned)    Completed ODILON WALSH     Pharmacy to dose Vancomycin consult  Once     Provider:  (Not yet assigned)    Acknowledged ODILON WALSH          Final Active Diagnoses:    Diagnosis Date Noted POA    PRINCIPAL PROBLEM:  Septic olecranon bursitis of right elbow [M71.121] 09/01/2019 Yes    Anemia [D64.9] 09/03/2019 Yes    Right arm cellulitis [L03.113] 08/31/2019 Yes    Anxiety [F41.9] 12/12/2012 Yes      Problems Resolved During this Admission:    Diagnosis Date Noted Date  Resolved POA    Abscess of bursa of right elbow [M71.021] 09/03/2019 09/04/2019 Yes    Olecranon bursa abscess, right [M71.021] 08/31/2019 09/04/2019 Yes      Discharged Condition: fair    Disposition: Home or Self Care    Follow Up:    Patient Instructions:      Ambulatory Referral to Infectious Disease   Referral Priority: Routine Referral Type: Consultation   Referral Reason: Specialty Services Required   Requested Specialty: Infectious Diseases   Number of Visits Requested: 1     Ambulatory Referral to Wound Clinic   Referral Priority: Routine Referral Type: Consultation   Referral Reason: Specialty Services Required   Requested Specialty: Wound Care   Number of Visits Requested: 1     Ambulatory Referral to Orthopedics   Referral Priority: Routine Referral Type: Consultation   Requested Specialty: Orthopedic Surgery   Number of Visits Requested: 1     Medications:  Reconciled Home Medications:      Medication List      Start taking these medications    sulfamethoxazole-trimethoprim 800-160mg 800-160 mg Tab  Commonly known as:  BACTRIM DS  Take 1 tablet by mouth 2 (two) times daily. for 14 days        Change how you take these medications    buPROPion 300 MG 24 hr tablet  Commonly known as:  WELLBUTRIN XL  Take 1 tablet (300 mg total) by mouth once daily.  What changed:  Another medication with the same name was removed. Continue taking this medication, and follow the directions you see here.     FLUoxetine 20 MG capsule  TAKE 1 CAPSULE(20 MG) BY MOUTH EVERY DAY  What changed:  Another medication with the same name was removed. Continue taking this medication, and follow the directions you see here.     traZODone 100 MG tablet  Commonly known as:  DESYREL  Take 1 tablet (100 mg total) by mouth every evening.  What changed:  Another medication with the same name was removed. Continue taking this medication, and follow the directions you see here.        Continue taking these medications    TRUVADA 200-300 mg  Tab  Generic drug:  emtricitabine-tenofovir 200-300 mg  TK 1 T PO  D            Significant Diagnostic Studies: Labs:   BMP:   Recent Labs   Lab 09/03/19  0701 09/04/19  0253   GLU 89 96    142   K 4.1 3.5    108   CO2 24 24   BUN 10 11   CREATININE 0.8 0.9   CALCIUM 9.2 8.5*   MG 2.3 2.2   , CMP   Recent Labs   Lab 09/03/19  0701 09/04/19  0253    142   K 4.1 3.5    108   CO2 24 24   GLU 89 96   BUN 10 11   CREATININE 0.8 0.9   CALCIUM 9.2 8.5*   ANIONGAP 10 10   ESTGFRAFRICA >60.0 >60.0   EGFRNONAA >60.0 >60.0   , CBC   Recent Labs   Lab 09/03/19  0701 09/04/19  0253   WBC 5.33 5.52   HGB 12.9* 11.7*   HCT 39.3* 36.6*   * 316   , INR No results found for: INR, PROTIME, Lipid Panel   Lab Results   Component Value Date    CHOL 168 09/28/2018    HDL 33 (L) 09/28/2018    LDLCALC 112.8 09/28/2018    TRIG 111 09/28/2018    CHOLHDL 19.6 (L) 09/28/2018   , Troponin No results for input(s): TROPONINI in the last 168 hours., A1C:   Recent Labs   Lab 09/01/19  0524   HGBA1C 4.8    and All labs within the past 24 hours have been reviewed  Microbiology:   Blood Culture   Lab Results   Component Value Date    LABBLOO No Growth to date 08/31/2019    LABBLOO No Growth to date 08/31/2019    LABBLOO No Growth to date 08/31/2019    LABBLOO No Growth to date 08/31/2019   , Sputum Culture No results found for: GSRESP, RESPIRATORYC and Urine Culture  No results found for: LABURIN  Radiology:   Imaging Results          X-Ray Elbow Complete Right (Final result)  Result time 08/31/19 21:33:28    Final result by Vincent Zapata MD (08/31/19 21:33:28)             Impression:      No acute fracture.      Electronically signed by: Vincent Zapata MD  Date:    08/31/2019  Time:    21:33           Narrative:    EXAMINATION:  XR ELBOW COMPLETE 3 VIEW RIGHT    CLINICAL HISTORY:  . Effusion, right elbow    TECHNIQUE:  AP, lateral, and radial head views of the right elbow were  performed.    COMPARISON:  None    FINDINGS:  No fracture or dislocation.  No fat pad elevation.  Cartilage spaces are maintained.  Soft tissues are unremarkable.                                Pending Diagnostic Studies:     None        Indwelling Lines/Drains at time of discharge:   Lines/Drains/Airways          None               Anthony Alvarado MD  Department of Hospital Medicine  [facility]

## 2019-09-04 NOTE — PLAN OF CARE
09/04/19 1625   Post-Acute Status   Post-Acute Authorization Placement   Post-Acute Placement Status Set-up Complete     Patient expected to discharge home today.    Macy Caceres LMSW  [facility]   [phone]

## 2019-09-04 NOTE — DISCHARGE INSTRUCTIONS
Nursing to cleanse wound to right elbow with sterile normal saline. Pack abscess wound lightly with aquacel ag rope with attention to packing wound @ 12 o'clock. Cover surround wound be area with aquacel ag and cover with gauze 4 x 4's and secure with roll gauze, tape and a tubular bandage. Dressing to be changed every 2 days and PRN if soiled or dislodge

## 2019-09-05 LAB
BACTERIA BLD CULT: NORMAL
BACTERIA BLD CULT: NORMAL

## 2019-09-05 NOTE — PLAN OF CARE
called [facility] in [place], [place].   spoke with Bridget Starkey regarding outpatient wound care for patient.  Informed by Bridget that an appointment will be made and patient will be called and informed.

## 2019-09-09 ENCOUNTER — TELEPHONE (OUTPATIENT)
Dept: SPORTS MEDICINE | Facility: CLINIC | Age: 54
End: 2019-09-09

## 2019-09-09 NOTE — TELEPHONE ENCOUNTER
Spoke with patient to schedule f/u from right septic olecranon bursitis s/p bedside I&D. Also gave patient directions to our location.

## 2019-09-12 ENCOUNTER — HOSPITAL ENCOUNTER (OUTPATIENT)
Dept: WOUND CARE | Facility: HOSPITAL | Age: 54
Discharge: HOME | End: 2019-09-12
Attending: SURGERY
Payer: COMMERCIAL

## 2019-09-12 VITALS
HEIGHT: 71 IN | SYSTOLIC BLOOD PRESSURE: 114 MMHG | WEIGHT: 190 LBS | HEART RATE: 69 BPM | DIASTOLIC BLOOD PRESSURE: 63 MMHG | TEMPERATURE: 98 F | BODY MASS INDEX: 26.6 KG/M2

## 2019-09-12 DIAGNOSIS — M71.121 SEPTIC OLECRANON BURSITIS OF RIGHT ELBOW: ICD-10-CM

## 2019-09-12 DIAGNOSIS — T81.89XA NON-HEALING SURGICAL WOUND, INITIAL ENCOUNTER: Primary | ICD-10-CM

## 2019-09-12 DIAGNOSIS — L03.113 RIGHT ARM CELLULITIS: ICD-10-CM

## 2019-09-12 DIAGNOSIS — F41.9 ANXIETY: ICD-10-CM

## 2019-09-12 PROCEDURE — 99202 OFFICE O/P NEW SF 15 MIN: CPT

## 2019-09-12 NOTE — PROGRESS NOTES
[facility]                Progress Note    Subjective:       Patient ID: Cristobal Ramirez is a 53 y.o. male.    Chief Complaint: No chief complaint on file.    9/12/2019: Presents to wound care clinic as new patient under Dr. Gutierrez's care. 54 yo man with c/o he got a reddened area over his right elbow.  This progressively enlarged and became painful.  He has had good range of motion of the elbow and no fever.  He denies any recent trauma to the area.  He went to an urgent care and had an I&D performed and was sent to the ED. Wound cultures results MRSA infections currently taking Bactrim oral antibiotic. Dressing changed as ordered.           Review of Systems   Constitutional: Negative.    HENT: Negative.    Eyes: Negative.    Respiratory: Negative.    Cardiovascular: Negative.    Gastrointestinal: Negative.    Genitourinary: Negative.    Musculoskeletal: Negative.    Neurological: Negative.    Psychiatric/Behavioral: Negative.          Objective:        Physical Exam   Constitutional: He is oriented to person, place, and time. He appears well-developed and well-nourished.   HENT:   Head: Normocephalic.   Eyes: Pupils are equal, round, and reactive to light. Conjunctivae and EOM are normal.   Neck: Normal range of motion. Neck supple.   Cardiovascular: Normal rate, regular rhythm, normal heart sounds and intact distal pulses.   Pulmonary/Chest: Effort normal and breath sounds normal.   Abdominal: Soft. Bowel sounds are normal.   Musculoskeletal: Normal range of motion.   Neurological: He is alert and oriented to person, place, and time. He has normal reflexes.   Skin: Skin is warm and dry.       There were no vitals filed for this visit.    Assessment:           ICD-10-CM ICD-9-CM   1. Non-healing surgical wound, initial encounter T81.89XA 998.83            Wound 09/12/19 1034 Abscess Elbow #1 (Active)   09/12/19 1034    Pre-existing:    Primary Wound  Type: Abscess   Side: Right   Orientation:    Location: Elbow   Wound/PI Number (optional): #1   Ankle-Brachial Index:    Pulses: radial pulses present   Removal Indication and Assessment:    Wound Outcome:    (Retired) Wound Type:    (Retired) Wound Length (cm):    (Retired) Wound Width (cm):    (Retired) Depth (cm):    Wound Description (Comments):    Removal Indications:    Wound Image   9/12/2019 10:33 AM   Dressing Appearance Open to air 9/12/2019 10:33 AM   Red (%), Wound Tissue Color 80 % 9/12/2019 10:33 AM   Yellow (%), Wound Tissue Color 20 % 9/12/2019 10:33 AM   Periwound Area Intact;Edematous;Redness 9/12/2019 10:33 AM   Wound Edges Defined 9/12/2019 10:33 AM   Wound Length (cm) 1 cm 9/12/2019 10:33 AM   Wound Width (cm) 1 cm 9/12/2019 10:33 AM   Wound Depth (cm) 0.1 cm 9/12/2019 10:33 AM   Wound Volume (cm^3) 0.1 cm^3 9/12/2019 10:33 AM   Wound Surface Area (cm^2) 1 cm^2 9/12/2019 10:33 AM   Care Cleansed with:;Sterile normal saline 9/12/2019 10:33 AM   Dressing Applied;Island/border 9/12/2019 10:33 AM   Periwound Care Skin barrier film applied 9/12/2019 10:33 AM   Dressing Change Due 09/13/19 9/12/2019 10:33 AM            Clean right elbow with normal saline, apply bactrim and cover with 4 x 4 aquacel border, change daily by patient.    Plan:                Follow up in about 1 week (around 9/19/2019).  Continue taking PO bactrim antibiotic as ordered.

## 2019-09-16 ENCOUNTER — OFFICE VISIT (OUTPATIENT)
Dept: SPORTS MEDICINE | Facility: CLINIC | Age: 54
End: 2019-09-16
Payer: COMMERCIAL

## 2019-09-16 ENCOUNTER — CLINICAL SUPPORT (OUTPATIENT)
Dept: INFECTIOUS DISEASES | Facility: CLINIC | Age: 54
End: 2019-09-16
Payer: COMMERCIAL

## 2019-09-16 ENCOUNTER — OFFICE VISIT (OUTPATIENT)
Dept: INFECTIOUS DISEASES | Facility: CLINIC | Age: 54
End: 2019-09-16
Payer: COMMERCIAL

## 2019-09-16 VITALS
HEIGHT: 71 IN | HEART RATE: 77 BPM | SYSTOLIC BLOOD PRESSURE: 115 MMHG | TEMPERATURE: 98 F | WEIGHT: 186.5 LBS | DIASTOLIC BLOOD PRESSURE: 77 MMHG | BODY MASS INDEX: 26.11 KG/M2

## 2019-09-16 VITALS
DIASTOLIC BLOOD PRESSURE: 72 MMHG | BODY MASS INDEX: 24.92 KG/M2 | WEIGHT: 178 LBS | HEART RATE: 71 BPM | HEIGHT: 71 IN | SYSTOLIC BLOOD PRESSURE: 142 MMHG

## 2019-09-16 DIAGNOSIS — M25.521 RIGHT ELBOW PAIN: ICD-10-CM

## 2019-09-16 DIAGNOSIS — L03.115 CELLULITIS AND ABSCESS OF RIGHT LOWER EXTREMITY: Primary | ICD-10-CM

## 2019-09-16 DIAGNOSIS — Z71.85 VACCINE COUNSELING: ICD-10-CM

## 2019-09-16 DIAGNOSIS — A49.02 MRSA INFECTION: ICD-10-CM

## 2019-09-16 DIAGNOSIS — L02.415 CELLULITIS AND ABSCESS OF RIGHT LOWER EXTREMITY: Primary | ICD-10-CM

## 2019-09-16 DIAGNOSIS — M71.121 SEPTIC OLECRANON BURSITIS OF RIGHT ELBOW: Primary | ICD-10-CM

## 2019-09-16 PROCEDURE — 99214 OFFICE O/P EST MOD 30 MIN: CPT | Mod: 25,S$GLB,, | Performed by: INTERNAL MEDICINE

## 2019-09-16 PROCEDURE — 90471 FLU VACCINE (QUAD) GREATER THAN OR EQUAL TO 3YO PRESERVATIVE FREE IM: ICD-10-PCS | Mod: S$GLB,,, | Performed by: INTERNAL MEDICINE

## 2019-09-16 PROCEDURE — 99999 PR PBB SHADOW E&M-EST. PATIENT-LVL III: CPT | Mod: PBBFAC,,, | Performed by: PHYSICIAN ASSISTANT

## 2019-09-16 PROCEDURE — 99999 PR PBB SHADOW E&M-EST. PATIENT-LVL III: ICD-10-PCS | Mod: PBBFAC,,, | Performed by: INTERNAL MEDICINE

## 2019-09-16 PROCEDURE — 90471 IMMUNIZATION ADMIN: CPT | Mod: S$GLB,,, | Performed by: INTERNAL MEDICINE

## 2019-09-16 PROCEDURE — 3008F PR BODY MASS INDEX (BMI) DOCUMENTED: ICD-10-PCS | Mod: CPTII,S$GLB,, | Performed by: INTERNAL MEDICINE

## 2019-09-16 PROCEDURE — 99214 PR OFFICE/OUTPT VISIT, EST, LEVL IV, 30-39 MIN: ICD-10-PCS | Mod: 25,S$GLB,, | Performed by: INTERNAL MEDICINE

## 2019-09-16 PROCEDURE — 99204 PR OFFICE/OUTPT VISIT, NEW, LEVL IV, 45-59 MIN: ICD-10-PCS | Mod: S$GLB,,, | Performed by: PHYSICIAN ASSISTANT

## 2019-09-16 PROCEDURE — 90686 IIV4 VACC NO PRSV 0.5 ML IM: CPT | Mod: S$GLB,,, | Performed by: INTERNAL MEDICINE

## 2019-09-16 PROCEDURE — 3008F PR BODY MASS INDEX (BMI) DOCUMENTED: ICD-10-PCS | Mod: CPTII,S$GLB,, | Performed by: PHYSICIAN ASSISTANT

## 2019-09-16 PROCEDURE — 99999 PR PBB SHADOW E&M-EST. PATIENT-LVL III: CPT | Mod: PBBFAC,,, | Performed by: INTERNAL MEDICINE

## 2019-09-16 PROCEDURE — 99999 PR PBB SHADOW E&M-EST. PATIENT-LVL III: ICD-10-PCS | Mod: PBBFAC,,, | Performed by: PHYSICIAN ASSISTANT

## 2019-09-16 PROCEDURE — 3008F BODY MASS INDEX DOCD: CPT | Mod: CPTII,S$GLB,, | Performed by: INTERNAL MEDICINE

## 2019-09-16 PROCEDURE — 99204 OFFICE O/P NEW MOD 45 MIN: CPT | Mod: S$GLB,,, | Performed by: PHYSICIAN ASSISTANT

## 2019-09-16 PROCEDURE — 3008F BODY MASS INDEX DOCD: CPT | Mod: CPTII,S$GLB,, | Performed by: PHYSICIAN ASSISTANT

## 2019-09-16 PROCEDURE — 90686 FLU VACCINE (QUAD) GREATER THAN OR EQUAL TO 3YO PRESERVATIVE FREE IM: ICD-10-PCS | Mod: S$GLB,,, | Performed by: INTERNAL MEDICINE

## 2019-09-16 NOTE — PROGRESS NOTES
INFECTIOUS DISEASE CLINIC  09/16/2019 12:53 PM    Subjective:      Chief Complaint: hospital discharge follow up    History of Present Illness:    Patient Cristobal Ramirez is a 53 y.o. male who presents today for hospital discharge follow up of Septic olecranon bursitis of right elbow. s/p I&D on 9/2. cultures +MRSA. Per ortho, joint involvement. Was seen by infectious disease and recommended 14 days of po bactrim. Pt reports complinance with abx; has 3 days left. Denies any joint pain. Denies f/c. Was also using topical antibiotic as recommend by wound care, but stopped recently when he accidentally left in hotel. Sees Dr Loredo regularly at [facility] for PreP; endorses compliance     Review of Symptoms:  Constitutional: Denies fevers, chills, or weakness.  ENT: Denies dysphagia, nasal discharge, ear pain or discharge.  Cardiovascular: Denies chest pain, palpitations, orthopnea, or claudication.  Respiratory: Denies shortness of breath, cough, hemoptysis, or wheezing.  GI: Denies nausea/vomitting, hematochezia, melena, abd pain, or changes in appetite.  : Denies dysuria, incontinence, or hematuria.  Musculoskeletal: Denies joint pain or myalgias.  Skin/breast: Denies rashes, lumps, lesions, or discharge.  Neurologic: Denies headache, dizziness, vertigo, or paresthesias.    Past Medical History:   Diagnosis Date    Allergy     Anxiety     Depression        Past Surgical History:   Procedure Laterality Date    COLONOSCOPY N/A 10/24/2016    Performed by Ozzy Wakefield MD at [facility] (4TH FLR)    COLONOSCOPY N/A 9/16/2016    Performed by Dalton Lane MD at [facility] (4TH FLR)    RHINOPLASTY TIP  1985       No family history on file.    Social History     Socioeconomic History    Marital status: Single     Spouse name: Not on file    Number of children: Not on file    Years of education: Not on file    Highest education level: Not on file   Occupational History     Employer: [employer]   Social Needs    Financial resource strain: Not on file    Food insecurity:     Worry: Not on file     Inability: Not on file    Transportation needs:     Medical: Not on file     Non-medical: Not on file   Tobacco Use    Smoking status: Current Every Day Smoker     Packs/day: 1.00     Types: Cigarettes    Smokeless tobacco: Never Used   Substance and Sexual Activity    Alcohol use: No    Drug use: No    Sexual activity: Yes     Partners: Male     Birth control/protection: None   Lifestyle    Physical activity:     Days per week: Not on file     Minutes per session: Not on file    Stress: Not on file   Relationships    Social connections:     Talks on phone: Not on file     Gets together: Not on file     Attends Church service: Not on file     Active member of club or organization: Not on file     Attends meetings of clubs or organizations: Not on file     Relationship status: Not on file   Other Topics Concern    Not on file   Social History Narrative    Not on file       Review of patient's allergies indicates:   Allergen Reactions    Pollen extracts          Objective:   /77 (BP Location: Left arm, Patient Position: Sitting)   Pulse 77   Temp 97.6 °F (36.4 °C) (Oral)   Ht 5' 11" (1.803 m)   Wt 84.6 kg (186 lb 8.2 oz)   BMI 26.01 kg/m²     General: Afebrile, alert, comfortable, no acute distress.   HEENT: JERALD. EOMI, no scleral icterus.   Pulmonary: Non labored,clear to auscultation A/P/L. No wheezing, crackles, or rhonchi.  Cardiac: normal S1 & S2 w/o rubs/murmurs/gallops.   Extremities: Moves all extremities x 4. No peripheral edema. 2+ pulses.  Skin:  Scab over R elbow. No areas of fluctuance or pain  Neurological:  Alert and oriented x 4.           Labs:  Glucose   Date Value Ref Range Status   09/04/2019 96 70 - 110 mg/dL Final   09/03/2019 89 70 - 110 mg/dL Final   09/02/2019 80 70 - 110 mg/dL Final     Calcium   Date Value Ref Range Status   09/04/2019 8.5 (L) 8.7 - " 10.5 mg/dL Final   09/03/2019 9.2 8.7 - 10.5 mg/dL Final   09/02/2019 8.6 (L) 8.7 - 10.5 mg/dL Final     Albumin   Date Value Ref Range Status   08/31/2019 3.2 (L) 3.5 - 5.2 g/dL Final   09/28/2018 3.9 3.5 - 5.2 g/dL Final   05/08/2017 3.9 3.5 - 5.2 g/dL Final     Total Protein   Date Value Ref Range Status   08/31/2019 7.1 6.0 - 8.4 g/dL Final   09/28/2018 7.2 6.0 - 8.4 g/dL Final   05/08/2017 7.4 6.0 - 8.4 g/dL Final     Sodium   Date Value Ref Range Status   09/04/2019 142 136 - 145 mmol/L Final   09/03/2019 142 136 - 145 mmol/L Final   09/02/2019 143 136 - 145 mmol/L Final     Potassium   Date Value Ref Range Status   09/04/2019 3.5 3.5 - 5.1 mmol/L Final   09/03/2019 4.1 3.5 - 5.1 mmol/L Final   09/02/2019 4.0 3.5 - 5.1 mmol/L Final     CO2   Date Value Ref Range Status   09/04/2019 24 23 - 29 mmol/L Final   09/03/2019 24 23 - 29 mmol/L Final   09/02/2019 23 23 - 29 mmol/L Final     Chloride   Date Value Ref Range Status   09/04/2019 108 95 - 110 mmol/L Final   09/03/2019 108 95 - 110 mmol/L Final   09/02/2019 110 95 - 110 mmol/L Final     BUN, Bld   Date Value Ref Range Status   09/04/2019 11 6 - 20 mg/dL Final   09/03/2019 10 6 - 20 mg/dL Final   09/02/2019 13 6 - 20 mg/dL Final     Creatinine   Date Value Ref Range Status   09/04/2019 0.9 0.5 - 1.4 mg/dL Final   09/03/2019 0.8 0.5 - 1.4 mg/dL Final   09/02/2019 0.8 0.5 - 1.4 mg/dL Final     Alkaline Phosphatase   Date Value Ref Range Status   08/31/2019 78 55 - 135 U/L Final   09/28/2018 82 55 - 135 U/L Final   05/08/2017 75 55 - 135 U/L Final     ALT   Date Value Ref Range Status   08/31/2019 14 10 - 44 U/L Final   09/28/2018 25 10 - 44 U/L Final   05/08/2017 36 10 - 44 U/L Final     AST   Date Value Ref Range Status   08/31/2019 12 10 - 40 U/L Final   09/28/2018 19 10 - 40 U/L Final   05/08/2017 26 10 - 40 U/L Final     Total Bilirubin   Date Value Ref Range Status   08/31/2019 0.2 0.1 - 1.0 mg/dL Final     Comment:     For infants and newborns,  interpretation of results should be based  on gestational age, weight and in agreement with clinical  observations.  Premature Infant recommended reference ranges:  Up to 24 hours.............<8.0 mg/dL  Up to 48 hours............<12.0 mg/dL  3-5 days..................<15.0 mg/dL  6-29 days.................<15.0 mg/dL     09/28/2018 0.4 0.1 - 1.0 mg/dL Final     Comment:     For infants and newborns, interpretation of results should be based  on gestational age, weight and in agreement with clinical  observations.  Premature Infant recommended reference ranges:  Up to 24 hours.............<8.0 mg/dL  Up to 48 hours............<12.0 mg/dL  3-5 days..................<15.0 mg/dL  6-29 days.................<15.0 mg/dL     05/08/2017 0.4 0.1 - 1.0 mg/dL Final     Comment:     For infants and newborns, interpretation of results should be based  on gestational age, weight and in agreement with clinical  observations.  Premature Infant recommended reference ranges:  Up to 24 hours.............<8.0 mg/dL  Up to 48 hours............<12.0 mg/dL  3-5 days..................<15.0 mg/dL  6-29 days.................<15.0 mg/dL       WBC   Date Value Ref Range Status   09/04/2019 5.52 3.90 - 12.70 K/uL Final   09/03/2019 5.33 3.90 - 12.70 K/uL Final   09/02/2019 5.86 3.90 - 12.70 K/uL Final     Hemoglobin   Date Value Ref Range Status   09/04/2019 11.7 (L) 14.0 - 18.0 g/dL Final   09/03/2019 12.9 (L) 14.0 - 18.0 g/dL Final   09/02/2019 12.0 (L) 14.0 - 18.0 g/dL Final     Hematocrit   Date Value Ref Range Status   09/04/2019 36.6 (L) 40.0 - 54.0 % Final   09/03/2019 39.3 (L) 40.0 - 54.0 % Final   09/02/2019 36.8 (L) 40.0 - 54.0 % Final     Mean Corpuscular Volume   Date Value Ref Range Status   09/04/2019 97 82 - 98 fL Final   09/03/2019 97 82 - 98 fL Final   09/02/2019 97 82 - 98 fL Final     Platelets   Date Value Ref Range Status   09/04/2019 316 150 - 350 K/uL Final   09/03/2019 357 (H) 150 - 350 K/uL Final   09/02/2019 332 150 -  350 K/uL Final     Lab Results   Component Value Date    CHOL 168 09/28/2018    CHOL 159 05/01/2017    CHOL 153 04/19/2016     Lab Results   Component Value Date    HDL 33 (L) 09/28/2018    HDL 28 (L) 05/01/2017    HDL 24 (L) 04/19/2016     Lab Results   Component Value Date    LDLCALC 112.8 09/28/2018    LDLCALC 108.2 05/01/2017    LDLCALC 102.6 04/19/2016     Lab Results   Component Value Date    TRIG 111 09/28/2018    TRIG 114 05/01/2017    TRIG 132 04/19/2016     Lab Results   Component Value Date    CHOLHDL 19.6 (L) 09/28/2018    CHOLHDL 17.6 (L) 05/01/2017    CHOLHDL 15.7 (L) 04/19/2016     No results found for: RPR  No results found for: QUANTIFERON    Medications:  Current Outpatient Medications on File Prior to Visit   Medication Sig Dispense Refill    buPROPion (WELLBUTRIN XL) 300 MG 24 hr tablet Take 1 tablet (300 mg total) by mouth once daily. 30 tablet 11    FLUoxetine (PROZAC) 20 MG capsule TAKE 1 CAPSULE(20 MG) BY MOUTH EVERY DAY 90 capsule 0    sulfamethoxazole-trimethoprim 800-160mg (BACTRIM DS) 800-160 mg Tab Take 1 tablet by mouth 2 (two) times daily. for 14 days 28 tablet 0    traZODone (DESYREL) 100 MG tablet Take 1 tablet (100 mg total) by mouth every evening. 30 tablet 11    TRUVADA 200-300 mg Tab TK 1 T PO  D  5     No current facility-administered medications on file prior to visit.        Antibiotics:   Antibiotics (From admission, onward)    None          HIV: No components found for: HIV 1/2 AG/AB  Hepatitis C IgG: No components found for: HEPATITIS C  Syphilis: No results found for: RPR    Hepatitis A IgG: No components found for: HEPATITIS A IGG  Hepatitis Bc IgG: No components found for: HEPATITIS B CORE IGG  Hepatitis Bs IgG:  Quantiferon: No results found for: QUANTIFERON  VZV IgG: No components found for: VARICELLA IGG    No components found for: SEDIMENTATION RATE  No components found for: C-REACTIVE PROTEIN      Microbiology x 7d:   Microbiology Results (last 7 days)     **  No results found for the last 168 hours. **          Immunization History   Administered Date(s) Administered    Influenza 12/08/2015    Influenza - Quadrivalent - PF (6 months and older) 12/12/2012, 08/29/2013, 10/03/2018    Influenza - Trivalent (ADULT) 12/08/2015    Influenza A (H1N1) 2009 Monovalent - IM 12/09/2009    Influenza Split 12/12/2012    Pneumococcal Polysaccharide - 23 Valent 08/29/2013    Tdap 04/26/2016     Component 2wk ago   Aerobic Bacterial Culture Abnormal    METHICILLIN RESISTANT STAPHYLOCOCCUS AUREUS   Moderate     Resulting Agency [facility]   Susceptibility      Methicillin resistant staphylococcus aureus     CULTURE, AEROBIC  (SPECIFY SOURCE)     Clindamycin <=0.5 mcg/mL Sensitive     Erythromycin >4 mcg/mL Resistant     Oxacillin >2 mcg/mL Resistant     Penicillin >8 mcg/mL Resistant     Tetracycline <=4 mcg/mL Sensitive     Trimeth/Sulfa <=0.5/9.5 m... Sensitive     Vancomycin 1 mcg/mL Sensitive            Linear View            Assessment:     H/o septic olecranon bursitis  mrsa infection    Plan:     Repeat ESR/CRP today. If normalized, stop antibiotic at end of planned 14 day course. Clinically improved with washout and 2 weeks abx.     F/u with [facility] provider for PreP    Flu vaccine today    rtc prn    Lisa Pierre MD, MPH  Infectious Disease

## 2019-09-16 NOTE — PROGRESS NOTES
Subjective:          Chief Complaint: Cristobal Ramirez is a 53 y.o. male who had concerns including Post-op Evaluation and Follow-up of the Right Elbow.    Cristobal Ramirez is a right handed ,who presents for follow-up of ED-admission 2 week ago. He reports diagnosis of cellulitis and abscess of right elbow. He was treated with I&d and IV vanc due to MRSA. 0/10 pain today. He reports significant improvement with abx. Followed by PCP and ID, now on Bactrim. Is no longer affecting ADLs and limiting desired level of activity. Denies numbness, tingling, radiation, and neck pain or radicular symptoms.  Pain is 0 /10 at its worst     Mechanical symptoms: none  Subjective instability: (--)   Worse with no particular activity  Better with rest.   Nocturnal symptoms: (--)     No previous surgeries or trauma on hand            Review of Systems   Constitution: Negative for chills and fever.   HENT: Negative for congestion and sore throat.    Eyes: Negative for discharge and double vision.   Cardiovascular: Negative for chest pain, palpitations and syncope.   Respiratory: Negative for cough and shortness of breath.    Endocrine: Negative for cold intolerance and heat intolerance.   Skin: Negative for dry skin and rash.   Musculoskeletal: Positive for joint pain. Negative for joint swelling.   Gastrointestinal: Negative for abdominal pain, nausea and vomiting.   Neurological: Negative for focal weakness, numbness and paresthesias.       Pain Related Questions  Over the past 3 days, what was your average pain during activity? (I.e. running, jogging, walking, climbing stairs, getting dressed, ect.): 0  Over the past 3 days, what was your highest pain level?: 0  Over the past 3 days, what was your lowest pain level? : 0    Other  How many nights a week are you awakened by your affected body part?: 0  Was the patient's HEIGHT measured or patient reported?: Patient Reported  Was the patient's WEIGHT measured or  patient reported?: Measured      Objective:        General: Cristobal is well-developed, well-nourished, appears stated age, in no acute distress, alert and oriented to time, place and person.     General    Vitals reviewed.  Constitutional: He is oriented to person, place, and time. He appears well-developed and well-nourished. No distress.   HENT:   Head: Normocephalic and atraumatic.   Nose: Nose normal.   Eyes: Conjunctivae and EOM are normal. Pupils are equal, round, and reactive to light.   Neck: Normal range of motion. Neck supple. No JVD present.   Cardiovascular: Normal heart sounds and intact distal pulses.    Pulmonary/Chest: Effort normal and breath sounds normal. No respiratory distress.   Abdominal: Soft. Bowel sounds are normal. He exhibits no distension.   Neurological: He is alert and oriented to person, place, and time. He has normal reflexes. Coordination normal.   Psychiatric: He has a normal mood and affect. His behavior is normal. Judgment and thought content normal.             Right Hand/Wrist Exam     Inspection   Scars: Wrist - absent Hand -  absent  Bruising: Wrist - absent Hand -  absent    Range of Motion     Wrist   Flexion: 70     Tests   Phalens Sign: negative  Tinel's sign (median nerve): negative  Finkelstein's test: negative      Other     Neuorologic Exam    Median Distribution: normal  Ulnar Distribution: normal  Radial Distribution: normal      Left Hand/Wrist Exam     Inspection   Scars: Wrist - absent Hand -  absent  Bruising: Wrist - absent Hand -  absent    Range of Motion     Wrist   Flexion: 70     Tests   Phalens Sign: negative  Tinel's sign (median nerve): negative  Finkelstein's test: negative      Other     Sensory Exam  Median Distribution: normal  Ulnar Distribution: normal  Radial Distribution: normal      Right Elbow Exam     Inspection   Scars: absent  Bruising: absent  Atrophy: absent    Pain   The patient exhibits pain of the olecranon    Swelling   The patient  is swollen on the olecranon    Tenderness   The patient is tender to palpation of the olecranon fossa.     Range of Motion   Extension: 0   Flexion: 140   Supination: 90     Tests   Tinel's sign (cubital tunnel): negative  Tennis Elbow: negative  Golfer's Elbow: negative  Radial Capitellar Grind: negative    Other   Sensation: normal    Comments:  Healing scab over olecrenon, mild erythema, non-indurated, non-fluctuant      Left Elbow Exam     Inspection   Scars: absent  Bruising: absent  Atrophy: absent    Range of Motion   Extension: 0   Flexion: 140   Supination: 90     Tests   Tinel's sign (cubital tunnel): negative  Tennis Elbow: negative  Golfer's Elbow: negative  Radial Capitellar Grind: negative    Other   Sensation: normal        Muscle Strength   Right Upper Extremity   Wrist extension: 5/5/5   Wrist flexion: 5/5/5   : 5/5/5   Pinch Mechanism: 5/5  Elbow Pronation:  5/5   Elbow Supination:  5/5   Elbow Extension: 5/5  Elbow Flexion: 5/5  Intrinsics: 5/5  EPL (Extensor Pollicis Longus): 5/5  Left Upper Extremity  Wrist extension: 5/5/5   Wrist flexion: 5/5/5   :  5/5/5   Pinch Mechanism: 5/5  Elbow Pronation:  5/5   Elbow Supination:  5/5   Elbow Extension: 5/5  Elbow Flexion: 5/5  Intrinsics: 5/5  EPL (Extensor Pollicis Longus): 5/5    Vascular Exam     Right Pulses      Radial:                    2+      Left Pulses      Radial:                    2+      Capillary Refill  Right Hand: normal capillary refill  Left Hand: normal capillary refill    Radiographic Findings:    No fracture or dislocation.  No fat pad elevation.  Cartilage spaces are maintained.  Soft tissues are unremarkable.    Xrays of the right elbow were reviewed by me today. These findings were discussed and reviewed with the patient.          Assessment:       Encounter Diagnoses   Name Primary?    Cellulitis and abscess of right lower extremity Yes    Right elbow pain           Plan:       1. Continue Bactrim for 14 days.  2.  Continue follow-up with ID  3. Ice compress to the affected area 2-3x a day for 15-20 minutes as needed for pain management.  4. Continue follow-up with PCP  5. CRP and ESR already ordered  6. RTC to see Gopal Rubalcava PA-C as needed follow-up.     All of the patient's questions were answered and the patient will contact us if they have any questions or concerns in the interim.              Patient questionnaires may have been collected.

## 2019-09-16 NOTE — LETTER
September 16, 2019      Itz Mullins MD  [address]  [facility] [address]           [facility]  [address]  [facility] [address]  Phone: [phone]          Patient: Cristobal Ramirez   MR Number: [id number]   YOB: 1965   Date of Visit: 9/16/2019       Dear Dr. Itz Mullins:    Thank you for referring Cristobal Ramirez to me for evaluation. Attached you will find relevant portions of my assessment and plan of care.    If you have questions, please do not hesitate to call me. I look forward to following Cristobal Ramirez along with you.    Sincerely,    Sly Rubalcava PA-C    Enclosure  CC:  No Recipients    If you would like to receive this communication electronically, please contact [email or web address] or [phone] to request more information on [facility] Link access.    For providers and/or their staff who would like to refer a patient to [facility], please contact us through our one-stop-shop provider referral line, [facility], at [phone].    If you feel you have received this communication in error or would no longer like to receive these types of communications, please e-mail [email or web address]

## 2019-09-16 NOTE — LETTER
September 16, 2019      Juvenal Mead PA-C  [address] [facility] [address]           [facility] - [facility]ious [facility]  [address]  [place] [address]  Phone: [phone]  Fax: [phone]          Patient: Cristobal Ramirez   MR Number: [id number]   YOB: 1965   Date of Visit: 9/16/2019       Dear Juvenal Mead:    Thank you for referring Cristobal Ramirez to me for evaluation. Attached you will find relevant portions of my assessment and plan of care.    If you have questions, please do not hesitate to call me. I look forward to following Cristobal Ramirez along with you.    Sincerely,    Lisa Pierre MD    Enclosure  CC:  No Recipients    If you would like to receive this communication electronically, please contact [email or web address] or [phone] to request more information on [facility] Link access.    For providers and/or their staff who would like to refer a patient to [facility], please contact us through our one-stop-shop provider referral line, [facility], at [phone].    If you feel you have received this communication in error or would no longer like to receive these types of communications, please e-mail [email or web address]

## 2019-09-19 ENCOUNTER — HOSPITAL ENCOUNTER (OUTPATIENT)
Dept: WOUND CARE | Facility: HOSPITAL | Age: 54
Discharge: HOME | End: 2019-09-19
Attending: SURGERY
Payer: COMMERCIAL

## 2019-09-19 VITALS
BODY MASS INDEX: 26.04 KG/M2 | TEMPERATURE: 97 F | WEIGHT: 186 LBS | SYSTOLIC BLOOD PRESSURE: 120 MMHG | HEART RATE: 60 BPM | DIASTOLIC BLOOD PRESSURE: 75 MMHG | HEIGHT: 71 IN

## 2019-09-19 DIAGNOSIS — M71.121 SEPTIC OLECRANON BURSITIS OF RIGHT ELBOW: ICD-10-CM

## 2019-09-19 DIAGNOSIS — T81.89XD NON-HEALING SURGICAL WOUND, SUBSEQUENT ENCOUNTER: ICD-10-CM

## 2019-09-19 DIAGNOSIS — L03.113 RIGHT ARM CELLULITIS: Primary | ICD-10-CM

## 2019-09-19 DIAGNOSIS — F41.9 ANXIETY: ICD-10-CM

## 2019-09-19 PROCEDURE — 99213 OFFICE O/P EST LOW 20 MIN: CPT

## 2019-09-19 NOTE — PROGRESS NOTES
Subjective:       Patient ID: Cristobal Ramirez is a 53 y.o. male.    Chief Complaint: Wound Care    9/12/2019: Presents to wound care clinic as new patient under Dr. Gutierrez's care. 54 yo man with c/o he got a reddened area over his right elbow.  This progressively enlarged and became painful.  He has had good range of motion of the elbow and no fever.  He denies any recent trauma to the area.  He went to an urgent care and had an I&D performed and was sent to the ED. Wound cultures results MRSA infections currently taking Bactrim oral antibiotic. Dressing changed as ordered.     9/19/19:  Dr Gutierrez assessed patient. Wound improving and reddness to periwound better. Continue with present plan of care. Followup in 1 week.      Review of Systems   Constitutional: Negative.    HENT: Negative.    Eyes: Negative.    Respiratory: Negative.    Cardiovascular: Negative.    Gastrointestinal: Negative.    Genitourinary: Negative.    Musculoskeletal: Negative.    Neurological: Negative.    Psychiatric/Behavioral: Negative.        Objective:      Physical Exam   Constitutional: He is oriented to person, place, and time. He appears well-developed and well-nourished.   HENT:   Head: Normocephalic.   Eyes: Pupils are equal, round, and reactive to light. Conjunctivae and EOM are normal.   Neck: Normal range of motion. Neck supple.   Cardiovascular: Normal rate, regular rhythm, normal heart sounds and intact distal pulses.   Pulmonary/Chest: Effort normal and breath sounds normal.   Abdominal: Soft. Bowel sounds are normal.   Musculoskeletal: Normal range of motion.   Neurological: He is alert and oriented to person, place, and time. He has normal reflexes.   Skin: Skin is warm and dry.       Assessment:       1. Right arm cellulitis           Wound 09/12/19 1034 Abscess Elbow #1 (Active)   09/12/19 1034    Pre-existing:    Primary Wound Type: Abscess   Side: Right   Orientation:    Location: Elbow   Wound/PI Number (optional):  #1   Ankle-Brachial Index:    Pulses: radial pulses present   Removal Indication and Assessment:    Wound Outcome:    (Retired) Wound Type:    (Retired) Wound Length (cm):    (Retired) Wound Width (cm):    (Retired) Depth (cm):    Wound Description (Comments):    Removal Indications:    Dressing Appearance Intact;Dry;Moist drainage 9/19/2019 12:00 PM   Drainage Amount Scant 9/19/2019 12:00 PM   Drainage Characteristics/Odor Serosanguineous 9/19/2019 12:00 PM   Red (%), Wound Tissue Color 100 % 9/19/2019 12:00 PM   Periwound Area Intact;Redness 9/19/2019 12:00 PM   Wound Edges Defined 9/19/2019 12:00 PM   Wound Length (cm) 0.3 cm 9/19/2019 12:00 PM   Wound Width (cm) 0.3 cm 9/19/2019 12:00 PM   Wound Depth (cm) 0.1 cm 9/19/2019 12:00 PM   Wound Volume (cm^3) 0.01 cm^3 9/19/2019 12:00 PM   Wound Surface Area (cm^2) 0.09 cm^2 9/19/2019 12:00 PM   Care Cleansed with:;Sterile normal saline 9/19/2019 12:00 PM   Dressing Applied;Island/border;Other (see comments) 9/19/2019 12:00 PM   Periwound Care Moisture barrier applied 9/19/2019 12:00 PM   Dressing Change Due 09/20/19 9/19/2019 12:00 PM   Clean right elbow with normal saline, apply bactrim and cover with 4 x 4 aquacel border, change daily by patient.    Follow up in about 1 week (around 9/26/2019).        Plan:                Follow up in about 1 week (around 9/26/2019).

## 2019-09-26 ENCOUNTER — HOSPITAL ENCOUNTER (OUTPATIENT)
Dept: WOUND CARE | Facility: HOSPITAL | Age: 54
Discharge: HOME | End: 2019-09-26
Attending: SURGERY
Payer: COMMERCIAL

## 2019-09-26 VITALS
HEART RATE: 68 BPM | SYSTOLIC BLOOD PRESSURE: 106 MMHG | TEMPERATURE: 98 F | BODY MASS INDEX: 26.04 KG/M2 | DIASTOLIC BLOOD PRESSURE: 66 MMHG | HEIGHT: 71 IN | WEIGHT: 186 LBS

## 2019-09-26 DIAGNOSIS — T81.89XD NON-HEALING SURGICAL WOUND, SUBSEQUENT ENCOUNTER: ICD-10-CM

## 2019-09-26 DIAGNOSIS — L03.113 RIGHT ARM CELLULITIS: Primary | ICD-10-CM

## 2019-09-26 DIAGNOSIS — F41.9 ANXIETY: ICD-10-CM

## 2019-09-26 DIAGNOSIS — T14.90XA TRAUMA: ICD-10-CM

## 2019-09-26 PROCEDURE — 99212 OFFICE O/P EST SF 10 MIN: CPT

## 2019-09-26 NOTE — PROGRESS NOTES
Subjective:       Patient ID: Cristobal Ramirez is a 53 y.o. male.    Chief Complaint: Wound Care    9/12/2019: Presents to wound care clinic as new patient under Dr. Gutierrez's care. 52 yo man with c/o he got a reddened area over his right elbow.  This progressively enlarged and became painful.  He has had good range of motion of the elbow and no fever.  He denies any recent trauma to the area.  He went to an urgent care and had an I&D performed and was sent to the ED. Wound cultures results MRSA infections currently taking Bactrim oral antibiotic. Dressing changed as ordered.     9/19/19:  Dr Gutierrez assessed patient. Wound improving and reddness to periwound better. Continue with present plan of care. Followup in 1 week.    9/26/19:  F/U appt.   Dr Gutierrez assessed patient's wound. Wound is resolved. Patient is discharged from wound care. Discharge instructions given; patient voiced understanding.      Review of Systems   Constitutional: Negative.    HENT: Negative.    Eyes: Negative.    Respiratory: Negative.    Cardiovascular: Negative.    Gastrointestinal: Negative.    Genitourinary: Negative.    Musculoskeletal: Negative.    Neurological: Negative.    Psychiatric/Behavioral: Negative.        Objective:      Physical Exam   Constitutional: He is oriented to person, place, and time. He appears well-developed and well-nourished.   HENT:   Head: Normocephalic.   Eyes: Pupils are equal, round, and reactive to light. Conjunctivae and EOM are normal.   Neck: Normal range of motion. Neck supple.   Cardiovascular: Normal rate, regular rhythm, normal heart sounds and intact distal pulses.   Pulmonary/Chest: Effort normal and breath sounds normal.   Abdominal: Soft. Bowel sounds are normal.   Musculoskeletal: Normal range of motion.   Neurological: He is alert and oriented to person, place, and time. He has normal reflexes.   Skin: Skin is warm and dry.       Assessment:       1. Right arm cellulitis           Wound  09/12/19 1034 Abscess Elbow #1 (Active)   09/12/19 1034    Pre-existing:    Primary Wound Type: Abscess   Side: Right   Orientation:    Location: Elbow   Wound/PI Number (optional): #1   Ankle-Brachial Index:    Pulses: radial pulses present   Removal Indication and Assessment:    Wound Outcome:    (Retired) Wound Type:    (Retired) Wound Length (cm):    (Retired) Wound Width (cm):    (Retired) Depth (cm):    Wound Description (Comments):    Removal Indications:    Wound Image   9/26/2019  9:00 AM   Appearance Epithelialization 9/26/2019  9:00 AM   Dressing Other (see comments) 9/26/2019  9:00 AM       Dr Gutierrez assessed patient's wound. Wound is resolved. Patient is discharged from wound care. Discharge instructions given; patient voiced understanding.    Plan:              Follow up if symptoms worsen or fail to improve.

## 2019-10-15 ENCOUNTER — LAB VISIT (OUTPATIENT)
Dept: LAB | Facility: HOSPITAL | Age: 54
End: 2019-10-15
Attending: FAMILY MEDICINE
Payer: COMMERCIAL

## 2019-10-15 DIAGNOSIS — Z00.00 ANNUAL PHYSICAL EXAM: ICD-10-CM

## 2019-10-15 DIAGNOSIS — Z12.5 ENCOUNTER FOR PROSTATE CANCER SCREENING: ICD-10-CM

## 2019-10-15 LAB
ALBUMIN SERPL BCP-MCNC: 4 G/DL (ref 3.5–5.2)
ALP SERPL-CCNC: 70 U/L (ref 55–135)
ALT SERPL W/O P-5'-P-CCNC: 19 U/L (ref 10–44)
ANION GAP SERPL CALC-SCNC: 9 MMOL/L (ref 8–16)
AST SERPL-CCNC: 20 U/L (ref 10–40)
BASOPHILS # BLD AUTO: 0.05 K/UL (ref 0–0.2)
BASOPHILS NFR BLD: 1 % (ref 0–1.9)
BILIRUB SERPL-MCNC: 0.4 MG/DL (ref 0.1–1)
BUN SERPL-MCNC: 16 MG/DL (ref 6–20)
CALCIUM SERPL-MCNC: 9.3 MG/DL (ref 8.7–10.5)
CHLORIDE SERPL-SCNC: 107 MMOL/L (ref 95–110)
CHOLEST SERPL-MCNC: 169 MG/DL (ref 120–199)
CHOLEST/HDLC SERPL: 5.5 {RATIO} (ref 2–5)
CO2 SERPL-SCNC: 25 MMOL/L (ref 23–29)
COMPLEXED PSA SERPL-MCNC: 1.1 NG/ML (ref 0–4)
CREAT SERPL-MCNC: 1.1 MG/DL (ref 0.5–1.4)
DIFFERENTIAL METHOD: ABNORMAL
EOSINOPHIL # BLD AUTO: 0.1 K/UL (ref 0–0.5)
EOSINOPHIL NFR BLD: 1.8 % (ref 0–8)
ERYTHROCYTE [DISTWIDTH] IN BLOOD BY AUTOMATED COUNT: 12.4 % (ref 11.5–14.5)
EST. GFR  (AFRICAN AMERICAN): >60 ML/MIN/1.73 M^2
EST. GFR  (NON AFRICAN AMERICAN): >60 ML/MIN/1.73 M^2
GLUCOSE SERPL-MCNC: 98 MG/DL (ref 70–110)
HCT VFR BLD AUTO: 43.1 % (ref 40–54)
HDLC SERPL-MCNC: 31 MG/DL (ref 40–75)
HDLC SERPL: 18.3 % (ref 20–50)
HGB BLD-MCNC: 14.5 G/DL (ref 14–18)
IMM GRANULOCYTES # BLD AUTO: 0.01 K/UL (ref 0–0.04)
IMM GRANULOCYTES NFR BLD AUTO: 0.2 % (ref 0–0.5)
LDLC SERPL CALC-MCNC: 111.6 MG/DL (ref 63–159)
LYMPHOCYTES # BLD AUTO: 1.8 K/UL (ref 1–4.8)
LYMPHOCYTES NFR BLD: 35.6 % (ref 18–48)
MCH RBC QN AUTO: 31.3 PG (ref 27–31)
MCHC RBC AUTO-ENTMCNC: 33.6 G/DL (ref 32–36)
MCV RBC AUTO: 93 FL (ref 82–98)
MONOCYTES # BLD AUTO: 0.6 K/UL (ref 0.3–1)
MONOCYTES NFR BLD: 11.1 % (ref 4–15)
NEUTROPHILS # BLD AUTO: 2.5 K/UL (ref 1.8–7.7)
NEUTROPHILS NFR BLD: 50.3 % (ref 38–73)
NONHDLC SERPL-MCNC: 138 MG/DL
NRBC BLD-RTO: 0 /100 WBC
PLATELET # BLD AUTO: 244 K/UL (ref 150–350)
PMV BLD AUTO: 9.8 FL (ref 9.2–12.9)
POTASSIUM SERPL-SCNC: 4.4 MMOL/L (ref 3.5–5.1)
PROT SERPL-MCNC: 7.1 G/DL (ref 6–8.4)
RBC # BLD AUTO: 4.64 M/UL (ref 4.6–6.2)
SODIUM SERPL-SCNC: 141 MMOL/L (ref 136–145)
TRIGL SERPL-MCNC: 132 MG/DL (ref 30–150)
TSH SERPL DL<=0.005 MIU/L-ACNC: 1.1 UIU/ML (ref 0.4–4)
WBC # BLD AUTO: 4.94 K/UL (ref 3.9–12.7)

## 2019-10-15 PROCEDURE — 36415 COLL VENOUS BLD VENIPUNCTURE: CPT | Mod: PN

## 2019-10-15 PROCEDURE — 80061 LIPID PANEL: CPT

## 2019-10-15 PROCEDURE — 84153 ASSAY OF PSA TOTAL: CPT

## 2019-10-15 PROCEDURE — 80053 COMPREHEN METABOLIC PANEL: CPT

## 2019-10-15 PROCEDURE — 85025 COMPLETE CBC W/AUTO DIFF WBC: CPT

## 2019-10-15 PROCEDURE — 84443 ASSAY THYROID STIM HORMONE: CPT

## 2019-10-19 DIAGNOSIS — F34.1 DYSTHYMIA: ICD-10-CM

## 2019-10-20 RX ORDER — BUPROPION HYDROCHLORIDE 300 MG/1
TABLET ORAL
Qty: 30 TABLET | Refills: 0 | Status: SHIPPED | OUTPATIENT
Start: 2019-10-20 | End: 2019-10-30 | Stop reason: SDUPTHER

## 2019-10-24 DIAGNOSIS — F32.A DEPRESSION, UNSPECIFIED DEPRESSION TYPE: ICD-10-CM

## 2019-10-24 DIAGNOSIS — F41.9 ANXIETY: ICD-10-CM

## 2019-10-24 RX ORDER — TRAZODONE HYDROCHLORIDE 100 MG/1
TABLET ORAL
Qty: 180 TABLET | Refills: 0 | Status: SHIPPED | OUTPATIENT
Start: 2019-10-24 | End: 2020-11-27 | Stop reason: SDUPTHER

## 2019-10-30 ENCOUNTER — OFFICE VISIT (OUTPATIENT)
Dept: PRIMARY CARE CLINIC | Facility: CLINIC | Age: 54
End: 2019-10-30
Payer: COMMERCIAL

## 2019-10-30 ENCOUNTER — IMMUNIZATION (OUTPATIENT)
Dept: PHARMACY | Facility: CLINIC | Age: 54
End: 2019-10-30
Payer: COMMERCIAL

## 2019-10-30 VITALS
SYSTOLIC BLOOD PRESSURE: 110 MMHG | BODY MASS INDEX: 26.22 KG/M2 | HEART RATE: 73 BPM | TEMPERATURE: 98 F | HEIGHT: 70 IN | DIASTOLIC BLOOD PRESSURE: 76 MMHG | WEIGHT: 183.19 LBS | OXYGEN SATURATION: 96 %

## 2019-10-30 DIAGNOSIS — Z12.83 SKIN EXAM FOR MALIGNANT NEOPLASM: ICD-10-CM

## 2019-10-30 DIAGNOSIS — R53.83 FATIGUE, UNSPECIFIED TYPE: Primary | ICD-10-CM

## 2019-10-30 DIAGNOSIS — K63.5 POLYP OF COLON, UNSPECIFIED PART OF COLON, UNSPECIFIED TYPE: ICD-10-CM

## 2019-10-30 DIAGNOSIS — F41.9 ANXIETY: ICD-10-CM

## 2019-10-30 DIAGNOSIS — F32.A DEPRESSION, UNSPECIFIED DEPRESSION TYPE: ICD-10-CM

## 2019-10-30 DIAGNOSIS — F34.1 DYSTHYMIA: ICD-10-CM

## 2019-10-30 PROCEDURE — 99396 PREV VISIT EST AGE 40-64: CPT | Mod: S$GLB,,, | Performed by: FAMILY MEDICINE

## 2019-10-30 PROCEDURE — 99999 PR PBB SHADOW E&M-EST. PATIENT-LVL IV: ICD-10-PCS | Mod: PBBFAC,,, | Performed by: FAMILY MEDICINE

## 2019-10-30 PROCEDURE — 99396 PR PREVENTIVE VISIT,EST,40-64: ICD-10-PCS | Mod: S$GLB,,, | Performed by: FAMILY MEDICINE

## 2019-10-30 PROCEDURE — 99999 PR PBB SHADOW E&M-EST. PATIENT-LVL IV: CPT | Mod: PBBFAC,,, | Performed by: FAMILY MEDICINE

## 2019-10-30 RX ORDER — BUPROPION HYDROCHLORIDE 300 MG/1
TABLET ORAL
Qty: 90 TABLET | Refills: 3 | Status: SHIPPED | OUTPATIENT
Start: 2019-10-30 | End: 2020-08-23

## 2019-10-30 RX ORDER — FLUOXETINE HYDROCHLORIDE 20 MG/1
CAPSULE ORAL
Qty: 90 CAPSULE | Refills: 3 | Status: SHIPPED | OUTPATIENT
Start: 2019-10-30 | End: 2020-08-24 | Stop reason: SDUPTHER

## 2019-10-30 NOTE — PROGRESS NOTES
Cristobal Ramirez is a 53 y.o. male     Chief Complaint:  Physical Exam  Source of history: Patient  Past Medical History:   Diagnosis Date    Allergy     Anxiety     Depression      Patient  reports that he has been smoking cigarettes. He has been smoking about 1.00 pack per day. He has never used smokeless tobacco. He reports that he does not drink alcohol or use drugs.  No family history on file.  ROS:      Answers for HPI/ROS submitted by the patient on 10/24/2019   activity change: No  unexpected weight change: No  neck pain: No  hearing loss: No  rhinorrhea: No  trouble swallowing: No  eye discharge: No  visual disturbance: No  chest tightness: No  wheezing: No  chest pain: No  palpitations: No  blood in stool: No  constipation: No  vomiting: No  diarrhea: No  polydipsia: No  polyuria: No  difficulty urinating: No  urgency: No  hematuria: No  joint swelling: No  arthralgias: No  headaches: No  weakness: No  confusion: No  dysphoric mood: No                                                                            GENERAL: No fever, chills, fatigability or weight loss. .  SKIN: No rashes, itching or changes in color or texture of skin.  HEAD: No headaches or recent head trauma.  EYES: Visual acuity fine. No photophobia, ocular pain or diplopia.  EARS: Denies ear pain, discharge or vertigo.  NOSE: No loss of smell, no epistaxis or postnasal drip.  MOUTH & THROAT: No hoarseness or change in voice. No excessive gum bleeding.  NODES: Denies swollen glands.  CHEST: Denies WHEAT, cyanosis, wheezing, cough and sputum production.  CARDIOVASCULAR: Denies chest pain, PND, orthopnea or reduced exercise tolerance.  ABDOMEN: Appetite fine. No weight loss. Denies diarrhea, abdominal pain, hematemesis or blood in stool.  URINARY: No flank pain, dysuria or hematuria. Frequency of urination at nighttime.  PERIPHERAL VASCULAR: No claudication or cyanosis.  MUSCULOSKELETAL:  No complaints  NEUROLOGIC: No history of seizures,  paralysis, alteration of gait or coordination.    OBJECTIVE:  APPEARANCE: Well nourished, well developed, in no acute distress.   VS:  see nurses notes 10/29/2019   SKIN: No lesions, good turgor, no rashes.    HEENT: TMs clear bilaterally, ear canals clear bilaterally, nasal mucosa clear bilaterally,   sinuses nontender to percussion, throat mild postnasal drip.  HEAD: Normocephalic, nontender to palpation.  NECK: Supple nontender, no carotid bruits, no thyromegaly.  NODES: Normal.  CHEST: Clear bilaterally, with good respiratory excursion, no evidence of respiratory distress.  CARDIOVASCULAR: S1, S2, regular rate and rhythm without murmur.  BREASTS: No masses palpated in either breast area, or axillary area, symmetry is noted.  ABDOMEN: Soft nontender no hepatosplenomegaly, no guarding or rebound, no pulsatile mass.  PERIPHERAL VASCULAR: Distal pulses palpable throughout, normal capillary refill in all distal extremities, no edema.  MUSCULOSKELETAL: No abnormalities noted.  BACK: No scoliosis, no spasm, cervical, thoracic, lumbar spine nontender to palpation  NEUROLOGIC: Cranial nerves II through XII grossly intact, motor exam 5 out of 5 on distal extremities, no gait disturbances, sensation intact in all distal extremities  MENTAL STATUS: Patient oriented x3, normal affect, normal mood    ASSESSMENT/PLAN:   Cristobal was seen today for annual exam.    Diagnoses and all orders for this visit:    Fatigue, unspecified type  -     Cancel: Testosterone; Future    Anxiety  -     FLUoxetine 20 MG capsule; TAKE 1 CAPSULE(20 MG) BY MOUTH EVERY DAY    Depression, unspecified depression type  -     FLUoxetine 20 MG capsule; TAKE 1 CAPSULE(20 MG) BY MOUTH EVERY DAY    Dysthymia  -     buPROPion (WELLBUTRIN XL) 300 MG 24 hr tablet; TAKE 1 TABLET(300 MG) BY MOUTH EVERY DAY    Skin exam for malignant neoplasm  -     Ambulatory referral to Dermatology    Polyp of colon, unspecified part of colon, unspecified type  -     Case  request GI: COLONOSCOPY       Patient's labs reviewed no areas of concern noted will contact the patient with results of colonoscopy when available

## 2019-11-06 DIAGNOSIS — Z12.11 COLON CANCER SCREENING: Primary | ICD-10-CM

## 2019-11-06 RX ORDER — POLYETHYLENE GLYCOL 3350, SODIUM SULFATE ANHYDROUS, SODIUM BICARBONATE, SODIUM CHLORIDE, POTASSIUM CHLORIDE 236; 22.74; 6.74; 5.86; 2.97 G/4L; G/4L; G/4L; G/4L; G/4L
4 POWDER, FOR SOLUTION ORAL ONCE
Qty: 4000 ML | Refills: 0 | Status: SHIPPED | OUTPATIENT
Start: 2019-11-06 | End: 2019-11-06

## 2019-11-11 DIAGNOSIS — F41.9 ANXIETY: ICD-10-CM

## 2019-11-11 DIAGNOSIS — F32.A DEPRESSION, UNSPECIFIED DEPRESSION TYPE: ICD-10-CM

## 2019-11-11 RX ORDER — TRAZODONE HYDROCHLORIDE 100 MG/1
TABLET ORAL
Qty: 180 TABLET | Refills: 0 | Status: SHIPPED | OUTPATIENT
Start: 2019-11-11 | End: 2020-02-12

## 2019-11-20 DIAGNOSIS — F34.1 DYSTHYMIA: ICD-10-CM

## 2019-11-20 RX ORDER — BUPROPION HYDROCHLORIDE 300 MG/1
TABLET ORAL
Qty: 30 TABLET | Refills: 0 | Status: SHIPPED | OUTPATIENT
Start: 2019-11-20 | End: 2021-01-20 | Stop reason: SDUPTHER

## 2019-12-13 ENCOUNTER — INITIAL CONSULT (OUTPATIENT)
Dept: DERMATOLOGY | Facility: CLINIC | Age: 54
End: 2019-12-13
Payer: COMMERCIAL

## 2019-12-13 DIAGNOSIS — L82.1 SEBORRHEIC KERATOSES: Primary | ICD-10-CM

## 2019-12-13 DIAGNOSIS — L81.4 LENTIGINES: ICD-10-CM

## 2019-12-13 DIAGNOSIS — D22.9 NEVUS OF MULTIPLE SITES: ICD-10-CM

## 2019-12-13 PROCEDURE — 99203 OFFICE O/P NEW LOW 30 MIN: CPT | Mod: S$GLB,,, | Performed by: DERMATOLOGY

## 2019-12-13 PROCEDURE — 99999 PR PBB SHADOW E&M-EST. PATIENT-LVL II: ICD-10-PCS | Mod: PBBFAC,,, | Performed by: DERMATOLOGY

## 2019-12-13 PROCEDURE — 99999 PR PBB SHADOW E&M-EST. PATIENT-LVL II: CPT | Mod: PBBFAC,,, | Performed by: DERMATOLOGY

## 2019-12-13 PROCEDURE — 99203 PR OFFICE/OUTPT VISIT, NEW, LEVL III, 30-44 MIN: ICD-10-PCS | Mod: S$GLB,,, | Performed by: DERMATOLOGY

## 2019-12-13 NOTE — LETTER
December 13, 2019      Judy Davenport MD  [address] [facility] [address]           [place] - D[facility]at[facility]  [address]  [place] [address]  Phone: [phone]  Fax: [phone]          Patient: Cristobal Ramirez   MR Number: [id number]   YOB: 1965   Date of Visit: 12/13/2019       Dear Dr. Judy Davenport:    Thank you for referring Cristobal Ramirez to me for evaluation. Attached you will find relevant portions of my assessment and plan of care.    If you have questions, please do not hesitate to call me. I look forward to following Cristobal Ramirez along with you.    Sincerely,    Penny De Luna MD    Enclosure  CC:  No Recipients    If you would like to receive this communication electronically, please contact [email or web address] or [phone] to request more information on [facility] Link access.    For providers and/or their staff who would like to refer a patient to [facility], please contact us through our one-stop-shop provider referral line, [facility], at [phone].    If you feel you have received this communication in error or would no longer like to receive these types of communications, please e-mail [email or web address]

## 2019-12-13 NOTE — PROGRESS NOTES
Subjective:       Patient ID:  Cristobal Ramirez is a 54 y.o. male who presents for   Chief Complaint   Patient presents with    Skin Check     Chest, back, right thigh     Referred for lesion on chest not painful present for months.  Also would like skin check, lesions in right sideburn for months no tx.       Review of Systems   Constitutional: Negative for fever, chills, fatigue and malaise.   Skin: Negative for daily sunscreen use, activity-related sunscreen use and recent sunburn.   Hematologic/Lymphatic: Does not bruise/bleed easily.        Objective:    Physical Exam   Constitutional: He appears well-developed and well-nourished. No distress.   Neurological: He is alert and oriented to person, place, and time. He is not disoriented.   Psychiatric: He has a normal mood and affect.   Skin:   Areas Examined (abnormalities noted in diagram):   Scalp / Hair Palpated and Inspected  Head / Face Inspection Performed  Neck Inspection Performed  Chest / Axilla Inspection Performed  Abdomen Inspection Performed  Genitals / Buttocks / Groin Inspection Performed  Back Inspection Performed  RUE Inspected  LUE Inspection Performed  RLE Inspected  LLE Inspection Performed  Nails and Digits Inspection Performed                   Diagram Legend     Erythematous scaling macule/papule c/w actinic keratosis       Vascular papule c/w angioma      Pigmented verrucoid papule/plaque c/w seborrheic keratosis      Yellow umbilicated papule c/w sebaceous hyperplasia      Irregularly shaped tan macule c/w lentigo     1-2 mm smooth white papules consistent with Milia      Movable subcutaneous cyst with punctum c/w epidermal inclusion cyst      Subcutaneous movable cyst c/w pilar cyst      Firm pink to brown papule c/w dermatofibroma      Pedunculated fleshy papule(s) c/w skin tag(s)      Evenly pigmented macule c/w junctional nevus     Mildly variegated pigmented, slightly irregular-bordered macule c/w mildly atypical nevus       "Flesh colored to evenly pigmented papule c/w intradermal nevus       Pink pearly papule/plaque c/w basal cell carcinoma      Erythematous hyperkeratotic cursted plaque c/w SCC      Surgical scar with no sign of skin cancer recurrence      Open and closed comedones      Inflammatory papules and pustules      Verrucoid papule consistent consistent with wart     Erythematous eczematous patches and plaques     Dystrophic onycholytic nail with subungual debris c/w onychomycosis     Umbilicated papule    Erythematous-base heme-crusted tan verrucoid plaque consistent with inflamed seborrheic keratosis     Erythematous Silvery Scaling Plaque c/w Psoriasis     See annotation      Assessment / Plan:        Seborrheic keratoses  reassurance  Brochure provided  Observe lesion on left chest call if grows       Nevus of multiple sites  The "ABCD" rules to observe pigmented lesions were reviewed.      Lentigines  The "ABCD" rules to observe pigmented lesions were reviewed.  sunscreen             Follow up in about 1 year (around 12/13/2020).  "

## 2020-01-03 ENCOUNTER — HOSPITAL ENCOUNTER (OUTPATIENT)
Facility: HOSPITAL | Age: 55
Discharge: HOME | End: 2020-01-03
Attending: COLON & RECTAL SURGERY | Admitting: COLON & RECTAL SURGERY
Payer: COMMERCIAL

## 2020-01-03 ENCOUNTER — ANESTHESIA (OUTPATIENT)
Dept: ENDOSCOPY | Facility: HOSPITAL | Age: 55
End: 2020-01-03
Payer: COMMERCIAL

## 2020-01-03 ENCOUNTER — ANESTHESIA EVENT (OUTPATIENT)
Dept: ENDOSCOPY | Facility: HOSPITAL | Age: 55
End: 2020-01-03
Payer: COMMERCIAL

## 2020-01-03 VITALS
WEIGHT: 180 LBS | OXYGEN SATURATION: 100 % | TEMPERATURE: 98 F | SYSTOLIC BLOOD PRESSURE: 109 MMHG | DIASTOLIC BLOOD PRESSURE: 68 MMHG | HEART RATE: 66 BPM | RESPIRATION RATE: 18 BRPM | HEIGHT: 71 IN | BODY MASS INDEX: 25.2 KG/M2

## 2020-01-03 DIAGNOSIS — Z12.11 SCREENING FOR COLON CANCER: ICD-10-CM

## 2020-01-03 PROCEDURE — 63600175 PHARM REV CODE 636 W HCPCS: Performed by: COLON & RECTAL SURGERY

## 2020-01-03 PROCEDURE — E9220 PRA ENDO ANESTHESIA: HCPCS | Mod: ,,, | Performed by: NURSE ANESTHETIST, CERTIFIED REGISTERED

## 2020-01-03 PROCEDURE — 37000009 HC ANESTHESIA EA ADD 15 MINS: Performed by: COLON & RECTAL SURGERY

## 2020-01-03 PROCEDURE — 63600175 PHARM REV CODE 636 W HCPCS: Performed by: NURSE ANESTHETIST, CERTIFIED REGISTERED

## 2020-01-03 PROCEDURE — G0105 COLORECTAL SCRN; HI RISK IND: ICD-10-PCS | Mod: ,,, | Performed by: COLON & RECTAL SURGERY

## 2020-01-03 PROCEDURE — E9220 PRA ENDO ANESTHESIA: ICD-10-PCS | Mod: ,,, | Performed by: NURSE ANESTHETIST, CERTIFIED REGISTERED

## 2020-01-03 PROCEDURE — G0105 COLORECTAL SCRN; HI RISK IND: HCPCS | Mod: ,,, | Performed by: COLON & RECTAL SURGERY

## 2020-01-03 PROCEDURE — G0105 COLORECTAL SCRN; HI RISK IND: HCPCS | Performed by: COLON & RECTAL SURGERY

## 2020-01-03 PROCEDURE — 37000008 HC ANESTHESIA 1ST 15 MINUTES: Performed by: COLON & RECTAL SURGERY

## 2020-01-03 RX ORDER — PROPOFOL 10 MG/ML
VIAL (ML) INTRAVENOUS
Status: DISCONTINUED | OUTPATIENT
Start: 2020-01-03 | End: 2020-01-03

## 2020-01-03 RX ORDER — PHENYLEPHRINE HYDROCHLORIDE 10 MG/ML
INJECTION INTRAVENOUS
Status: DISCONTINUED | OUTPATIENT
Start: 2020-01-03 | End: 2020-01-03

## 2020-01-03 RX ORDER — LIDOCAINE HCL/PF 100 MG/5ML
SYRINGE (ML) INTRAVENOUS
Status: DISCONTINUED | OUTPATIENT
Start: 2020-01-03 | End: 2020-01-03

## 2020-01-03 RX ORDER — PROPOFOL 10 MG/ML
VIAL (ML) INTRAVENOUS CONTINUOUS PRN
Status: DISCONTINUED | OUTPATIENT
Start: 2020-01-03 | End: 2020-01-03

## 2020-01-03 RX ORDER — SODIUM CHLORIDE 9 MG/ML
INJECTION, SOLUTION INTRAVENOUS CONTINUOUS
Status: DISCONTINUED | OUTPATIENT
Start: 2020-01-03 | End: 2020-01-03 | Stop reason: HOSPADM

## 2020-01-03 RX ADMIN — SODIUM CHLORIDE: 0.9 INJECTION, SOLUTION INTRAVENOUS at 10:01

## 2020-01-03 RX ADMIN — PROPOFOL 100 MG: 10 INJECTION, EMULSION INTRAVENOUS at 12:01

## 2020-01-03 RX ADMIN — PHENYLEPHRINE HYDROCHLORIDE 100 MCG: 10 INJECTION INTRAVENOUS at 12:01

## 2020-01-03 RX ADMIN — PROPOFOL 150 MCG/KG/MIN: 10 INJECTION, EMULSION INTRAVENOUS at 12:01

## 2020-01-03 RX ADMIN — LIDOCAINE HYDROCHLORIDE 100 MG: 20 INJECTION, SOLUTION INTRAVENOUS at 12:01

## 2020-01-03 NOTE — PLAN OF CARE
Dr. Costello reviewed procedure with pt. Discharge instructions given and explained to pt. Pt verbalizes understanding of instructions.

## 2020-01-03 NOTE — PROVATION PATIENT INSTRUCTIONS
Discharge Summary/Instructions after an Endoscopic Procedure  Patient Name: Cristobal Ramirez  Patient MRN: [id number]  Patient YOB: 1965 Friday, January 03, 2020  Adolph Costello MD  RESTRICTIONS:  During your procedure today, you received medications for sedation.  These   medications may affect your judgment, balance and coordination.  Therefore,   for 24 hours, you have the following restrictions:   - DO NOT drive a car, operate machinery, make legal/financial decisions,   sign important papers or drink alcohol.    ACTIVITY:  Today: no heavy lifting, straining or running due to procedural   sedation/anesthesia.  The following day: return to full activity including work.  DIET:  Eat and drink normally unless instructed otherwise.     TREATMENT FOR COMMON SIDE EFFECTS:  - Mild abdominal pain, nausea, belching, bloating or excessive gas:  rest,   eat lightly and use a heating pad.  - Sore Throat: treat with throat lozenges and/or gargle with warm salt   water.  - Because air was used during the procedure, expelling large amounts of air   from your rectum or belching is normal.  - If a bowel prep was taken, you may not have a bowel movement for 1-3 days.    This is normal.  SYMPTOMS TO WATCH FOR AND REPORT TO YOUR PHYSICIAN:  1. Abdominal pain or bloating, other than gas cramps.  2. Chest pain.  3. Back pain.  4. Signs of infection such as: chills or fever occurring within 24 hours   after the procedure.  5. Rectal bleeding, which would show as bright red, maroon, or black stools.   (A tablespoon of blood from the rectum is not serious, especially if   hemorrhoids are present.)  6. Vomiting.  7. Weakness or dizziness.  GO DIRECTLY TO THE NEAREST EMERGENCY ROOM IF YOU HAVE ANY OF THE FOLLOWING:      Difficulty breathing              Chills and/or fever over 101 F   Persistent vomiting and/or vomiting blood   Severe abdominal pain   Severe chest pain   Black, tarry stools   Bleeding- more than one  tablespoon   Any other symptom or condition that you feel may need urgent attention  Your doctor recommends these additional instructions:  If any biopsies were taken, your doctors clinic will contact you in 1 to 2   weeks with any results.  - Discharge patient to home (ambulatory).   - Resume previous diet indefinitely.   - Continue present medications.   - Repeat colonoscopy in 6 years for screening purposes.  For questions, problems or results please call your physician - Adolph Costello MD at Work:  [phone].  [facility], EMERGENCY ROOM PHONE NUMBER: [phone]  IF A COMPLICATION OR EMERGENCY SITUATION ARISES AND YOU ARE UNABLE TO REACH   YOUR PHYSICIAN - GO DIRECTLY TO THE EMERGENCY ROOM.  Adolph Costello MD  1/3/2020 12:51:12 PM  This report has been verified and signed electronically.  [employer]

## 2020-01-03 NOTE — TRANSFER OF CARE
"Anesthesia Transfer of Care Note    Patient: Cristobal Ramirez    Procedure(s) Performed: Procedure(s) (LRB):  COLONOSCOPY (N/A)    Patient location: GI    Anesthesia Type: general    Transport from OR: Transported from OR on 6-10 L/min O2 by face mask with adequate spontaneous ventilation    Post pain: adequate analgesia    Post assessment: no apparent anesthetic complications and tolerated procedure well    Post vital signs: stable    Level of consciousness: awake    Nausea/Vomiting: no nausea/vomiting    Complications: none    Transfer of care protocol was followed      Last vitals:   Visit Vitals  /69 (BP Location: Left arm, Patient Position: Lying)   Pulse 70   Temp 36.7 °C (98.1 °F)   Resp 18   Ht 5' 11" (1.803 m)   Wt 81.6 kg (180 lb)   SpO2 97%   BMI 25.10 kg/m²     "

## 2020-01-03 NOTE — ANESTHESIA RELEASE NOTE
"Anesthesia Release from PACU Note    Patient: Cristobal Ramirez    Procedure(s) Performed: Procedure(s) (LRB):  COLONOSCOPY (N/A)    Anesthesia type: general    Post pain: Adequate analgesia    Post assessment: no apparent anesthetic complications and tolerated procedure well    Last Vitals:   Visit Vitals  /68 (BP Location: Left arm, Patient Position: Sitting)   Pulse 66   Temp 36.5 °C (97.7 °F) (Temporal)   Resp 18   Ht 5' 11" (1.803 m)   Wt 81.6 kg (180 lb)   SpO2 100%   BMI 25.10 kg/m²       Post vital signs: stable    Level of consciousness: awake, alert  and oriented    Nausea/Vomiting: no nausea/no vomiting    Complications: none    Airway Patency: patent    Respiratory: unassisted, spontaneous ventilation, room air    Cardiovascular: stable and blood pressure at baseline    Hydration: euvolemic  "

## 2020-01-03 NOTE — H&P
Endoscopy H&P    Procedure : Colonoscopy      asymptomatic screening exam      Past Medical History:   Diagnosis Date    Allergy     Anxiety     Depression        Family History   Problem Relation Age of Onset    Melanoma Neg Hx        Social History     Socioeconomic History    Marital status: Single     Spouse name: Not on file    Number of children: Not on file    Years of education: Not on file    Highest education level: Not on file   Occupational History     Employer: [employer]   Social Needs    Financial resource strain: Not on file    Food insecurity:     Worry: Not on file     Inability: Not on file    Transportation needs:     Medical: Not on file     Non-medical: Not on file   Tobacco Use    Smoking status: Current Every Day Smoker     Packs/day: 1.00     Types: Vaping with nicotine    Smokeless tobacco: Never Used   Substance and Sexual Activity    Alcohol use: No    Drug use: No    Sexual activity: Yes     Partners: Male     Birth control/protection: None   Lifestyle    Physical activity:     Days per week: Not on file     Minutes per session: Not on file    Stress: Not on file   Relationships    Social connections:     Talks on phone: Not on file     Gets together: Not on file     Attends Restorationism service: Not on file     Active member of club or organization: Not on file     Attends meetings of clubs or organizations: Not on file     Relationship status: Not on file   Other Topics Concern    Not on file   Social History Narrative    Not on file       Review of Systems:  Respiratory ROS: no cough, shortness of breath, or wheezing  Cardiovascular ROS: no chest pain or dyspnea on exertion  Gastrointestinal ROS: no abdominal pain, change in bowel habits, or black or bloody stools  Musculoskeletal ROS: negative  Neurological ROS: no TIA or stroke symptoms        Physical Exam:  General: no  distress  Head: normocephalic  Neck: supple, symmetrical, trachea midline  Lungs:  clear to auscultation bilaterally and normal respiratory effort  Heart: regular rate and rhythm, S1, S2 normal, no murmur, rub or gallop  Abdomen: soft, non-tender non-distented; bowel sounds normal; no masses,  no organomegaly  Extremities: no cyanosis or edema, or clubbing       Deep Sedation: Mallampati Score II (hard and soft palate, upper portion of tonsils anduvula visible)    II    Assessment and Plan:  Proceed with Colonoscopy

## 2020-01-03 NOTE — ANESTHESIA POSTPROCEDURE EVALUATION
Anesthesia Post Evaluation    Patient: Cristobal Ramirez    Procedure(s) Performed: Procedure(s) (LRB):  COLONOSCOPY (N/A)    Final Anesthesia Type: general    Patient location during evaluation: GI PACU  Patient participation: Yes- Able to Participate  Level of consciousness: awake and alert  Post-procedure vital signs: reviewed and stable  Pain management: adequate  Airway patency: patent    PONV status at discharge: No PONV  Anesthetic complications: no      Cardiovascular status: blood pressure returned to baseline and stable  Respiratory status: unassisted, spontaneous ventilation and room air  Hydration status: euvolemic  Follow-up not needed.          Vitals Value Taken Time   /68 1/3/2020  1:16 PM   Temp 36.5 °C (97.7 °F) 1/3/2020 12:56 PM   Pulse 66 1/3/2020  1:16 PM   Resp 18 1/3/2020  1:16 PM   SpO2 100 % 1/3/2020  1:16 PM         Event Time     Out of Recovery 13:23:51          Pain/Brian Score: Brian Score: 10 (1/3/2020  1:16 PM)

## 2020-01-03 NOTE — ANESTHESIA PREPROCEDURE EVALUATION
01/03/2020  Cristobal Ramirez is a 54 y.o., male.    Patient Active Problem List   Diagnosis    Anxiety    BPH (benign prostatic hyperplasia)    Smoker    Trauma    Rib fractures    Screening for colon cancer    Colon cancer screening    Precancerous lesion    Right arm cellulitis    Septic olecranon bursitis of right elbow    Anemia    Surgical wound, non healing     Past Medical History:   Diagnosis Date    Allergy     Anxiety     Depression      Past Surgical History:   Procedure Laterality Date    COLONOSCOPY N/A 9/16/2016    Procedure: COLONOSCOPY;  Surgeon: Dalton Lane MD;  Location: [facility] ENDO ([address] [place] [address]);  Service: Endoscopy;  Laterality: N/A;    COLONOSCOPY N/A 10/24/2016    Procedure: COLONOSCOPY;  Surgeon: Ozzy Wakefield MD;  Location: [facility] ENDO ([address] [place] [address]);  Service: Endoscopy;  Laterality: N/A;    RHINOPLASTY TIP  1985         Anesthesia Evaluation    I have reviewed the Patient Summary Reports.     I have reviewed the Medications.     Review of Systems  Anesthesia Hx:  No problems with previous Anesthesia    Hepatic/GI:   Bowel Prep.        Physical Exam  General:  Well nourished    Airway/Jaw/Neck:  Airway Findings: Mouth Opening: Normal Tongue: Normal  General Airway Assessment: Adult  Mallampati: II  Improves to II with phonation.  TM Distance: Normal, at least 6 cm      Dental:  Dental Findings: In tact             Anesthesia Plan  Type of Anesthesia, risks & benefits discussed:  Anesthesia Type:  general  Patient's Preference:   Intra-op Monitoring Plan: standard ASA monitors  Intra-op Monitoring Plan Comments:   Post Op Pain Control Plan:   Post Op Pain Control Plan Comments:   Induction:   IV  Beta Blocker:  Patient is not currently on a Beta-Blocker (No further documentation required).       Informed Consent: Patient understands risks and agrees with Anesthesia  plan.  Questions answered. Anesthesia consent signed with patient.  ASA Score: 2     Day of Surgery Review of History & Physical: I have interviewed and examined the patient. I have reviewed the patient's H&P dated:  There are no significant changes.          Ready For Surgery From Anesthesia Perspective.

## 2020-01-10 NOTE — PLAN OF CARE
Patient discharged home with outpatient wound care provided by [facility].  Patient's transportation home provided by his sister via personal vehicle.   called [facility] and spoke with Bridget Starkey regarding outpatient wound care for the patient.  Bridget Starkey informed  that she will notify patient to set up appointment.    Future Appointments   Date Time Provider Department Center   9/16/2019  1:30 PM Lisa Pierre MD [facility] ID [address]   10/30/2019  8:20 AM Judy Davenport MD [facility]        09/05/19 0857   Final Note   Assessment Type Final Discharge Note   Anticipated Discharge Disposition Home  ([facility])   What phone number can be called within the next 1-3 days to see how you are doing after discharge? [phone]   Hospital Follow Up  Appt(s) scheduled? Yes   Discharge plans and expectations educations in teach back method with documentation complete? Yes   Right Care Referral Info   Post Acute Recommendation   ([facility])      Declined

## 2020-02-12 DIAGNOSIS — F32.A DEPRESSION, UNSPECIFIED DEPRESSION TYPE: ICD-10-CM

## 2020-02-12 DIAGNOSIS — F41.9 ANXIETY: ICD-10-CM

## 2020-02-12 RX ORDER — TRAZODONE HYDROCHLORIDE 100 MG/1
TABLET ORAL
Qty: 180 TABLET | Refills: 0 | Status: SHIPPED | OUTPATIENT
Start: 2020-02-12 | End: 2020-05-11

## 2020-05-11 DIAGNOSIS — F32.A DEPRESSION, UNSPECIFIED DEPRESSION TYPE: ICD-10-CM

## 2020-05-11 DIAGNOSIS — F41.9 ANXIETY: ICD-10-CM

## 2020-05-11 RX ORDER — TRAZODONE HYDROCHLORIDE 100 MG/1
TABLET ORAL
Qty: 180 TABLET | Refills: 0 | Status: SHIPPED | OUTPATIENT
Start: 2020-05-11 | End: 2020-08-23

## 2020-08-24 DIAGNOSIS — F41.9 ANXIETY: ICD-10-CM

## 2020-08-24 DIAGNOSIS — F32.A DEPRESSION, UNSPECIFIED DEPRESSION TYPE: ICD-10-CM

## 2020-08-24 RX ORDER — FLUOXETINE HYDROCHLORIDE 20 MG/1
CAPSULE ORAL
Qty: 90 CAPSULE | Refills: 3 | Status: SHIPPED | OUTPATIENT
Start: 2020-08-24 | End: 2021-01-20 | Stop reason: SDUPTHER

## 2020-11-24 ENCOUNTER — PATIENT MESSAGE (OUTPATIENT)
Dept: PRIMARY CARE CLINIC | Facility: CLINIC | Age: 55
End: 2020-11-24

## 2020-11-24 DIAGNOSIS — Z00.00 ROUTINE GENERAL MEDICAL EXAMINATION AT A HEALTH CARE FACILITY: Primary | ICD-10-CM

## 2020-12-15 ENCOUNTER — PATIENT MESSAGE (OUTPATIENT)
Dept: PRIMARY CARE CLINIC | Facility: CLINIC | Age: 55
End: 2020-12-15

## 2020-12-15 ENCOUNTER — LAB VISIT (OUTPATIENT)
Dept: LAB | Facility: HOSPITAL | Age: 55
End: 2020-12-15
Attending: FAMILY MEDICINE
Payer: COMMERCIAL

## 2020-12-15 DIAGNOSIS — Z00.00 ROUTINE GENERAL MEDICAL EXAMINATION AT A HEALTH CARE FACILITY: ICD-10-CM

## 2020-12-15 DIAGNOSIS — Z91.89 AT INCREASED RISK OF EXPOSURE TO COVID-19 VIRUS: Primary | ICD-10-CM

## 2020-12-15 LAB
ALBUMIN SERPL BCP-MCNC: 4.1 G/DL (ref 3.5–5.2)
ALP SERPL-CCNC: 59 U/L (ref 55–135)
ALT SERPL W/O P-5'-P-CCNC: 25 U/L (ref 10–44)
ANION GAP SERPL CALC-SCNC: 9 MMOL/L (ref 8–16)
AST SERPL-CCNC: 23 U/L (ref 10–40)
BASOPHILS # BLD AUTO: 0.05 K/UL (ref 0–0.2)
BASOPHILS NFR BLD: 0.7 % (ref 0–1.9)
BILIRUB SERPL-MCNC: 0.5 MG/DL (ref 0.1–1)
BUN SERPL-MCNC: 12 MG/DL (ref 6–20)
CALCIUM SERPL-MCNC: 9 MG/DL (ref 8.7–10.5)
CHLORIDE SERPL-SCNC: 105 MMOL/L (ref 95–110)
CHOLEST SERPL-MCNC: 190 MG/DL (ref 120–199)
CHOLEST/HDLC SERPL: 5.4 {RATIO} (ref 2–5)
CO2 SERPL-SCNC: 25 MMOL/L (ref 23–29)
COMPLEXED PSA SERPL-MCNC: 1.3 NG/ML (ref 0–4)
CREAT SERPL-MCNC: 1 MG/DL (ref 0.5–1.4)
DIFFERENTIAL METHOD: ABNORMAL
EOSINOPHIL # BLD AUTO: 0.1 K/UL (ref 0–0.5)
EOSINOPHIL NFR BLD: 1.3 % (ref 0–8)
ERYTHROCYTE [DISTWIDTH] IN BLOOD BY AUTOMATED COUNT: 11.2 % (ref 11.5–14.5)
EST. GFR  (AFRICAN AMERICAN): >60 ML/MIN/1.73 M^2
EST. GFR  (NON AFRICAN AMERICAN): >60 ML/MIN/1.73 M^2
GLUCOSE SERPL-MCNC: 94 MG/DL (ref 70–110)
HCT VFR BLD AUTO: 43.7 % (ref 40–54)
HDLC SERPL-MCNC: 35 MG/DL (ref 40–75)
HDLC SERPL: 18.4 % (ref 20–50)
HGB BLD-MCNC: 14.8 G/DL (ref 14–18)
IMM GRANULOCYTES # BLD AUTO: 0.02 K/UL (ref 0–0.04)
IMM GRANULOCYTES NFR BLD AUTO: 0.3 % (ref 0–0.5)
LDLC SERPL CALC-MCNC: 102.8 MG/DL (ref 63–159)
LYMPHOCYTES # BLD AUTO: 2 K/UL (ref 1–4.8)
LYMPHOCYTES NFR BLD: 28.7 % (ref 18–48)
MCH RBC QN AUTO: 32.5 PG (ref 27–31)
MCHC RBC AUTO-ENTMCNC: 33.9 G/DL (ref 32–36)
MCV RBC AUTO: 96 FL (ref 82–98)
MONOCYTES # BLD AUTO: 0.6 K/UL (ref 0.3–1)
MONOCYTES NFR BLD: 8.4 % (ref 4–15)
NEUTROPHILS # BLD AUTO: 4.2 K/UL (ref 1.8–7.7)
NEUTROPHILS NFR BLD: 60.6 % (ref 38–73)
NONHDLC SERPL-MCNC: 155 MG/DL
NRBC BLD-RTO: 0 /100 WBC
PLATELET # BLD AUTO: 247 K/UL (ref 150–350)
PMV BLD AUTO: 9.6 FL (ref 9.2–12.9)
POTASSIUM SERPL-SCNC: 4 MMOL/L (ref 3.5–5.1)
PROT SERPL-MCNC: 7.2 G/DL (ref 6–8.4)
RBC # BLD AUTO: 4.56 M/UL (ref 4.6–6.2)
SODIUM SERPL-SCNC: 139 MMOL/L (ref 136–145)
TRIGL SERPL-MCNC: 261 MG/DL (ref 30–150)
TSH SERPL DL<=0.005 MIU/L-ACNC: 1.92 UIU/ML (ref 0.4–4)
WBC # BLD AUTO: 6.89 K/UL (ref 3.9–12.7)

## 2020-12-15 PROCEDURE — 36415 COLL VENOUS BLD VENIPUNCTURE: CPT | Mod: PN

## 2020-12-15 PROCEDURE — 84443 ASSAY THYROID STIM HORMONE: CPT

## 2020-12-15 PROCEDURE — 84153 ASSAY OF PSA TOTAL: CPT

## 2020-12-15 PROCEDURE — 85025 COMPLETE CBC W/AUTO DIFF WBC: CPT

## 2020-12-15 PROCEDURE — 80061 LIPID PANEL: CPT

## 2020-12-15 PROCEDURE — 80053 COMPREHEN METABOLIC PANEL: CPT

## 2020-12-16 ENCOUNTER — PATIENT MESSAGE (OUTPATIENT)
Dept: PRIMARY CARE CLINIC | Facility: CLINIC | Age: 55
End: 2020-12-16

## 2020-12-17 ENCOUNTER — LAB VISIT (OUTPATIENT)
Dept: LAB | Facility: HOSPITAL | Age: 55
End: 2020-12-17
Attending: FAMILY MEDICINE
Payer: COMMERCIAL

## 2020-12-17 DIAGNOSIS — Z91.89 AT INCREASED RISK OF EXPOSURE TO COVID-19 VIRUS: ICD-10-CM

## 2020-12-17 LAB — SARS-COV-2 IGG SERPLBLD QL IA.RAPID: NEGATIVE

## 2020-12-17 PROCEDURE — 36415 COLL VENOUS BLD VENIPUNCTURE: CPT | Mod: PO

## 2020-12-17 PROCEDURE — 86769 SARS-COV-2 COVID-19 ANTIBODY: CPT

## 2021-01-20 ENCOUNTER — IMMUNIZATION (OUTPATIENT)
Dept: PHARMACY | Facility: CLINIC | Age: 56
End: 2021-01-20
Payer: COMMERCIAL

## 2021-01-20 ENCOUNTER — OFFICE VISIT (OUTPATIENT)
Dept: PRIMARY CARE CLINIC | Facility: CLINIC | Age: 56
End: 2021-01-20
Payer: COMMERCIAL

## 2021-01-20 VITALS
HEIGHT: 71 IN | OXYGEN SATURATION: 95 % | BODY MASS INDEX: 27 KG/M2 | SYSTOLIC BLOOD PRESSURE: 120 MMHG | WEIGHT: 192.88 LBS | HEART RATE: 70 BPM | DIASTOLIC BLOOD PRESSURE: 76 MMHG | TEMPERATURE: 98 F

## 2021-01-20 DIAGNOSIS — F41.9 ANXIETY: ICD-10-CM

## 2021-01-20 DIAGNOSIS — F34.1 DYSTHYMIA: ICD-10-CM

## 2021-01-20 DIAGNOSIS — F32.A DEPRESSION, UNSPECIFIED DEPRESSION TYPE: ICD-10-CM

## 2021-01-20 PROCEDURE — 3008F PR BODY MASS INDEX (BMI) DOCUMENTED: ICD-10-PCS | Mod: CPTII,S$GLB,, | Performed by: FAMILY MEDICINE

## 2021-01-20 PROCEDURE — 99999 PR PBB SHADOW E&M-EST. PATIENT-LVL III: ICD-10-PCS | Mod: PBBFAC,,, | Performed by: FAMILY MEDICINE

## 2021-01-20 PROCEDURE — 1126F PR PAIN SEVERITY QUANTIFIED, NO PAIN PRESENT: ICD-10-PCS | Mod: S$GLB,,, | Performed by: FAMILY MEDICINE

## 2021-01-20 PROCEDURE — 99396 PREV VISIT EST AGE 40-64: CPT | Mod: S$GLB,,, | Performed by: FAMILY MEDICINE

## 2021-01-20 PROCEDURE — 99396 PR PREVENTIVE VISIT,EST,40-64: ICD-10-PCS | Mod: S$GLB,,, | Performed by: FAMILY MEDICINE

## 2021-01-20 PROCEDURE — 99999 PR PBB SHADOW E&M-EST. PATIENT-LVL III: CPT | Mod: PBBFAC,,, | Performed by: FAMILY MEDICINE

## 2021-01-20 PROCEDURE — 3008F BODY MASS INDEX DOCD: CPT | Mod: CPTII,S$GLB,, | Performed by: FAMILY MEDICINE

## 2021-01-20 PROCEDURE — 1126F AMNT PAIN NOTED NONE PRSNT: CPT | Mod: S$GLB,,, | Performed by: FAMILY MEDICINE

## 2021-01-20 RX ORDER — EMTRICITABINE AND TENOFOVIR ALAFENAMIDE 200; 25 MG/1; MG/1
TABLET ORAL
COMMUNITY
Start: 2020-11-24

## 2021-01-20 RX ORDER — FLUOXETINE HYDROCHLORIDE 20 MG/1
CAPSULE ORAL
Qty: 90 CAPSULE | Refills: 3 | Status: SHIPPED | OUTPATIENT
Start: 2021-01-20 | End: 2021-11-12

## 2021-01-20 RX ORDER — TRAZODONE HYDROCHLORIDE 100 MG/1
TABLET ORAL
Qty: 180 TABLET | Refills: 0 | Status: SHIPPED | OUTPATIENT
Start: 2021-01-20 | End: 2021-02-15

## 2021-01-20 RX ORDER — BUPROPION HYDROCHLORIDE 300 MG/1
300 TABLET ORAL DAILY
Qty: 30 TABLET | Refills: 0 | Status: SHIPPED | OUTPATIENT
Start: 2021-01-20 | End: 2022-01-26 | Stop reason: SDUPTHER

## 2021-04-16 ENCOUNTER — PATIENT MESSAGE (OUTPATIENT)
Dept: RESEARCH | Facility: HOSPITAL | Age: 56
End: 2021-04-16

## 2021-10-28 ENCOUNTER — TELEPHONE (OUTPATIENT)
Dept: PRIMARY CARE CLINIC | Facility: CLINIC | Age: 56
End: 2021-10-28
Payer: COMMERCIAL

## 2021-10-28 DIAGNOSIS — Z00.00 ROUTINE GENERAL MEDICAL EXAMINATION AT A HEALTH CARE FACILITY: Primary | ICD-10-CM

## 2022-01-10 DIAGNOSIS — F41.9 ANXIETY: ICD-10-CM

## 2022-01-10 DIAGNOSIS — F32.A DEPRESSION, UNSPECIFIED DEPRESSION TYPE: ICD-10-CM

## 2022-01-10 NOTE — TELEPHONE ENCOUNTER
No new care gaps identified.  Powered by wali [employer]. Reference number: [id number].   1/10/2022 3:50:26 PM CST

## 2022-01-11 RX ORDER — TRAZODONE HYDROCHLORIDE 100 MG/1
TABLET ORAL
Qty: 180 TABLET | Refills: 0 | Status: SHIPPED | OUTPATIENT
Start: 2022-01-11 | End: 2022-01-26 | Stop reason: SDUPTHER

## 2022-01-20 ENCOUNTER — LAB VISIT (OUTPATIENT)
Dept: LAB | Facility: HOSPITAL | Age: 57
End: 2022-01-20
Attending: FAMILY MEDICINE
Payer: COMMERCIAL

## 2022-01-20 DIAGNOSIS — Z00.00 ROUTINE GENERAL MEDICAL EXAMINATION AT A HEALTH CARE FACILITY: ICD-10-CM

## 2022-01-20 LAB
ALBUMIN SERPL BCP-MCNC: 4.1 G/DL (ref 3.5–5.2)
ALP SERPL-CCNC: 58 U/L (ref 55–135)
ALT SERPL W/O P-5'-P-CCNC: 32 U/L (ref 10–44)
ANION GAP SERPL CALC-SCNC: 8 MMOL/L (ref 8–16)
AST SERPL-CCNC: 25 U/L (ref 10–40)
BASOPHILS # BLD AUTO: 0.05 K/UL (ref 0–0.2)
BASOPHILS NFR BLD: 0.9 % (ref 0–1.9)
BILIRUB SERPL-MCNC: 0.5 MG/DL (ref 0.1–1)
BUN SERPL-MCNC: 14 MG/DL (ref 6–20)
CALCIUM SERPL-MCNC: 9.5 MG/DL (ref 8.7–10.5)
CHLORIDE SERPL-SCNC: 107 MMOL/L (ref 95–110)
CHOLEST SERPL-MCNC: 187 MG/DL (ref 120–199)
CHOLEST/HDLC SERPL: 4.6 {RATIO} (ref 2–5)
CO2 SERPL-SCNC: 23 MMOL/L (ref 23–29)
COMPLEXED PSA SERPL-MCNC: 1.4 NG/ML (ref 0–4)
CREAT SERPL-MCNC: 0.9 MG/DL (ref 0.5–1.4)
DIFFERENTIAL METHOD: ABNORMAL
EOSINOPHIL # BLD AUTO: 0.1 K/UL (ref 0–0.5)
EOSINOPHIL NFR BLD: 1 % (ref 0–8)
ERYTHROCYTE [DISTWIDTH] IN BLOOD BY AUTOMATED COUNT: 11.6 % (ref 11.5–14.5)
EST. GFR  (AFRICAN AMERICAN): >60 ML/MIN/1.73 M^2
EST. GFR  (NON AFRICAN AMERICAN): >60 ML/MIN/1.73 M^2
GLUCOSE SERPL-MCNC: 100 MG/DL (ref 70–110)
HCT VFR BLD AUTO: 43.9 % (ref 40–54)
HDLC SERPL-MCNC: 41 MG/DL (ref 40–75)
HDLC SERPL: 21.9 % (ref 20–50)
HGB BLD-MCNC: 14.6 G/DL (ref 14–18)
IMM GRANULOCYTES # BLD AUTO: 0.01 K/UL (ref 0–0.04)
IMM GRANULOCYTES NFR BLD AUTO: 0.2 % (ref 0–0.5)
LDLC SERPL CALC-MCNC: 117.2 MG/DL (ref 63–159)
LYMPHOCYTES # BLD AUTO: 1.6 K/UL (ref 1–4.8)
LYMPHOCYTES NFR BLD: 28.1 % (ref 18–48)
MCH RBC QN AUTO: 32.2 PG (ref 27–31)
MCHC RBC AUTO-ENTMCNC: 33.3 G/DL (ref 32–36)
MCV RBC AUTO: 97 FL (ref 82–98)
MONOCYTES # BLD AUTO: 0.6 K/UL (ref 0.3–1)
MONOCYTES NFR BLD: 9.6 % (ref 4–15)
NEUTROPHILS # BLD AUTO: 3.5 K/UL (ref 1.8–7.7)
NEUTROPHILS NFR BLD: 60.2 % (ref 38–73)
NONHDLC SERPL-MCNC: 146 MG/DL
NRBC BLD-RTO: 0 /100 WBC
PLATELET # BLD AUTO: 277 K/UL (ref 150–450)
PMV BLD AUTO: 9.5 FL (ref 9.2–12.9)
POTASSIUM SERPL-SCNC: 3.9 MMOL/L (ref 3.5–5.1)
PROT SERPL-MCNC: 7.1 G/DL (ref 6–8.4)
RBC # BLD AUTO: 4.53 M/UL (ref 4.6–6.2)
SODIUM SERPL-SCNC: 138 MMOL/L (ref 136–145)
TRIGL SERPL-MCNC: 144 MG/DL (ref 30–150)
TSH SERPL DL<=0.005 MIU/L-ACNC: 1.26 UIU/ML (ref 0.4–4)
WBC # BLD AUTO: 5.81 K/UL (ref 3.9–12.7)

## 2022-01-20 PROCEDURE — 80061 LIPID PANEL: CPT | Performed by: FAMILY MEDICINE

## 2022-01-20 PROCEDURE — 84443 ASSAY THYROID STIM HORMONE: CPT | Performed by: FAMILY MEDICINE

## 2022-01-20 PROCEDURE — 80053 COMPREHEN METABOLIC PANEL: CPT | Performed by: FAMILY MEDICINE

## 2022-01-20 PROCEDURE — 36415 COLL VENOUS BLD VENIPUNCTURE: CPT | Mod: PN | Performed by: FAMILY MEDICINE

## 2022-01-20 PROCEDURE — 85025 COMPLETE CBC W/AUTO DIFF WBC: CPT | Performed by: FAMILY MEDICINE

## 2022-01-20 PROCEDURE — 84153 ASSAY OF PSA TOTAL: CPT | Performed by: FAMILY MEDICINE

## 2022-01-26 ENCOUNTER — OFFICE VISIT (OUTPATIENT)
Dept: PRIMARY CARE CLINIC | Facility: CLINIC | Age: 57
End: 2022-01-26
Payer: COMMERCIAL

## 2022-01-26 VITALS
TEMPERATURE: 98 F | OXYGEN SATURATION: 97 % | HEIGHT: 71 IN | BODY MASS INDEX: 25.12 KG/M2 | HEART RATE: 73 BPM | SYSTOLIC BLOOD PRESSURE: 136 MMHG | WEIGHT: 179.44 LBS | DIASTOLIC BLOOD PRESSURE: 82 MMHG

## 2022-01-26 DIAGNOSIS — F41.9 ANXIETY: ICD-10-CM

## 2022-01-26 DIAGNOSIS — F32.A DEPRESSION, UNSPECIFIED DEPRESSION TYPE: ICD-10-CM

## 2022-01-26 DIAGNOSIS — F34.1 DYSTHYMIA: ICD-10-CM

## 2022-01-26 DIAGNOSIS — H60.22 CHRONIC MALIGNANT OTITIS EXTERNA OF LEFT EAR: Primary | ICD-10-CM

## 2022-01-26 PROCEDURE — 3008F BODY MASS INDEX DOCD: CPT | Mod: CPTII,S$GLB,, | Performed by: FAMILY MEDICINE

## 2022-01-26 PROCEDURE — 99999 PR PBB SHADOW E&M-EST. PATIENT-LVL IV: CPT | Mod: PBBFAC,,, | Performed by: FAMILY MEDICINE

## 2022-01-26 PROCEDURE — 3079F DIAST BP 80-89 MM HG: CPT | Mod: CPTII,S$GLB,, | Performed by: FAMILY MEDICINE

## 2022-01-26 PROCEDURE — 1160F RVW MEDS BY RX/DR IN RCRD: CPT | Mod: CPTII,S$GLB,, | Performed by: FAMILY MEDICINE

## 2022-01-26 PROCEDURE — 3079F PR MOST RECENT DIASTOLIC BLOOD PRESSURE 80-89 MM HG: ICD-10-PCS | Mod: CPTII,S$GLB,, | Performed by: FAMILY MEDICINE

## 2022-01-26 PROCEDURE — 1159F MED LIST DOCD IN RCRD: CPT | Mod: CPTII,S$GLB,, | Performed by: FAMILY MEDICINE

## 2022-01-26 PROCEDURE — 3075F PR MOST RECENT SYSTOLIC BLOOD PRESS GE 130-139MM HG: ICD-10-PCS | Mod: CPTII,S$GLB,, | Performed by: FAMILY MEDICINE

## 2022-01-26 PROCEDURE — 99999 PR PBB SHADOW E&M-EST. PATIENT-LVL IV: ICD-10-PCS | Mod: PBBFAC,,, | Performed by: FAMILY MEDICINE

## 2022-01-26 PROCEDURE — 99396 PREV VISIT EST AGE 40-64: CPT | Mod: S$GLB,,, | Performed by: FAMILY MEDICINE

## 2022-01-26 PROCEDURE — 3075F SYST BP GE 130 - 139MM HG: CPT | Mod: CPTII,S$GLB,, | Performed by: FAMILY MEDICINE

## 2022-01-26 PROCEDURE — 1159F PR MEDICATION LIST DOCUMENTED IN MEDICAL RECORD: ICD-10-PCS | Mod: CPTII,S$GLB,, | Performed by: FAMILY MEDICINE

## 2022-01-26 PROCEDURE — 99396 PR PREVENTIVE VISIT,EST,40-64: ICD-10-PCS | Mod: S$GLB,,, | Performed by: FAMILY MEDICINE

## 2022-01-26 PROCEDURE — 1160F PR REVIEW ALL MEDS BY PRESCRIBER/CLIN PHARMACIST DOCUMENTED: ICD-10-PCS | Mod: CPTII,S$GLB,, | Performed by: FAMILY MEDICINE

## 2022-01-26 PROCEDURE — 3008F PR BODY MASS INDEX (BMI) DOCUMENTED: ICD-10-PCS | Mod: CPTII,S$GLB,, | Performed by: FAMILY MEDICINE

## 2022-01-26 RX ORDER — NEOMYCIN SULFATE, POLYMYXIN B SULFATE AND HYDROCORTISONE 10; 3.5; 1 MG/ML; MG/ML; [USP'U]/ML
3 SUSPENSION/ DROPS AURICULAR (OTIC) 3 TIMES DAILY
Qty: 10 ML | Refills: 3 | Status: SHIPPED | OUTPATIENT
Start: 2022-01-26 | End: 2024-03-27

## 2022-01-26 RX ORDER — TRAZODONE HYDROCHLORIDE 100 MG/1
TABLET ORAL
Qty: 180 TABLET | Refills: 3 | Status: SHIPPED | OUTPATIENT
Start: 2022-01-26 | End: 2022-05-25 | Stop reason: SDUPTHER

## 2022-01-26 RX ORDER — FLUOXETINE HYDROCHLORIDE 20 MG/1
20 CAPSULE ORAL DAILY
Qty: 90 CAPSULE | Refills: 3 | Status: SHIPPED | OUTPATIENT
Start: 2022-01-26 | End: 2022-11-15 | Stop reason: SDUPTHER

## 2022-01-26 RX ORDER — BUPROPION HYDROCHLORIDE 300 MG/1
300 TABLET ORAL DAILY
Qty: 90 TABLET | Refills: 3 | Status: SHIPPED | OUTPATIENT
Start: 2022-01-26 | End: 2022-11-09 | Stop reason: SDUPTHER

## 2022-01-26 NOTE — PROGRESS NOTES
Cristobal Ramirez is a 56 y.o. male     Chief Complaint:  Physical Exam  Source of history: Patient  Past Medical History:   Diagnosis Date    Allergy     Anxiety     Depression      Patient  reports that he has been smoking vaping with nicotine. He has been smoking about 1.00 pack per day. He has never used smokeless tobacco. He reports that he does not drink alcohol and does not use drugs.  Family History   Problem Relation Age of Onset    Melanoma Neg Hx      ROS:                                                                                GENERAL: No fever, chills, fatigability or weight loss. .  SKIN: No rashes, itching or changes in color or texture of skin.  HEAD: No headaches or recent head trauma.  EYES: Visual acuity fine. No photophobia, ocular pain or diplopia.  EARS: Denies ear pain, discharge or vertigo.  NOSE: No loss of smell, no epistaxis or postnasal drip.  MOUTH & THROAT: No hoarseness or change in voice. No excessive gum bleeding.  NODES: Denies swollen glands.  CHEST: Denies WHEAT, cyanosis, wheezing, cough and sputum production.  CARDIOVASCULAR: Denies chest pain, PND, orthopnea or reduced exercise tolerance.  ABDOMEN: Appetite fine. No weight loss. Denies diarrhea, abdominal pain, hematemesis or blood in stool.  URINARY: No flank pain, dysuria or hematuria. Frequency of urination at nighttime.  PERIPHERAL VASCULAR: No claudication or cyanosis.  MUSCULOSKELETAL:  No complaints  NEUROLOGIC: No history of seizures, paralysis, alteration of gait or coordination.    OBJECTIVE:  APPEARANCE: Well nourished, well developed, in no acute distress.   VS:  see nurses notes 1/26/2022   SKIN: No lesions, good turgor, no rashes. eczema  HEENT: TMs clear bilaterally, ear canals clear bilaterally, nasal mucosa clear bilaterally, sinuses nontender to percussion, throat mild postnasal drip.  HEAD: Normocephalic, nontender to palpation.  NECK: Supple nontender, no carotid bruits, no thyromegaly.  NODES:  Normal.  CHEST: Clear bilaterally, with good respiratory excursion, no evidence of respiratory distress.  CARDIOVASCULAR: S1, S2, regular rate and rhythm without murmur.  BREASTS: No masses palpated in either breast area, or axillary area, symmetry is noted.  ABDOMEN: Soft nontender no hepatosplenomegaly, no guarding or rebound, no pulsatile mass.  PERIPHERAL VASCULAR: Distal pulses palpable throughout, normal capillary refill in all distal extremities, no edema.  MUSCULOSKELETAL: No abnormalities noted.  BACK: No scoliosis, no spasm, cervical, thoracic, lumbar spine nontender to palpation  NEUROLOGIC: Cranial nerves II through XII grossly intact, motor exam 5 out of 5 on distal extremities, no gait disturbances, sensation intact in all distal extremities  MENTAL STATUS: Patient oriented x3, normal affect, normal mood    ASSESSMENT/PLAN:   Cristobal was seen today for annual exam.    Diagnoses and all orders for this visit:    Chronic malignant otitis externa of left ear  -     neomycin-polymyxin-hydrocortisone (CORTISPORIN) 3.5-10,000-1 mg/mL-unit/mL-% otic suspension; Place 3 drops into both ears 3 (three) times daily.    Anxiety  -     FLUoxetine 20 MG capsule; Take 1 capsule (20 mg total) by mouth once daily.  -     traZODone (DESYREL) 100 MG tablet; One po qhs    Depression, unspecified depression type  -     FLUoxetine 20 MG capsule; Take 1 capsule (20 mg total) by mouth once daily.  -     traZODone (DESYREL) 100 MG tablet; One po qhs    Dysthymia  -     buPROPion (WELLBUTRIN XL) 300 MG 24 hr tablet; Take 1 tablet (300 mg total) by mouth once daily.    labs reviewed no areas of concern

## 2022-05-25 ENCOUNTER — PATIENT MESSAGE (OUTPATIENT)
Dept: PRIMARY CARE CLINIC | Facility: CLINIC | Age: 57
End: 2022-05-25
Payer: COMMERCIAL

## 2022-05-25 DIAGNOSIS — F32.A DEPRESSION, UNSPECIFIED DEPRESSION TYPE: ICD-10-CM

## 2022-05-25 DIAGNOSIS — F41.9 ANXIETY: ICD-10-CM

## 2022-05-25 RX ORDER — TRAZODONE HYDROCHLORIDE 100 MG/1
TABLET ORAL
Qty: 180 TABLET | Refills: 3 | Status: SHIPPED | OUTPATIENT
Start: 2022-05-25 | End: 2023-05-12 | Stop reason: SDUPTHER

## 2022-05-25 NOTE — TELEPHONE ENCOUNTER
No new care gaps identified.  [facility] Embedded Care Gaps. Reference number: [id number]. 5/25/2022   2:09:35 PM CDT

## 2022-09-11 NOTE — PLAN OF CARE
Problem: Adult Inpatient Plan of Care  Goal: Plan of Care Review  Outcome: Ongoing (interventions implemented as appropriate)  Pt aaox4, independent and ambulatory.  IV abx continued.  Wound care of right elbow performed 9/3 AM by wound care nurse, will reassess 9/5.  Pt had no complaints of pain or discomfort.  Pt ambulates to the bathroom and uses toilet appropriately.  No falls or injuries occurred during the night.  Plan of care reviewed with patient.  MERCEDES.       No

## 2022-10-28 NOTE — TELEPHONE ENCOUNTER
----- Message from Corinne Mary sent at 9/12/2018  2:15 PM CDT -----  Contact: Pt Mobile/Home [phone]  Doctor appointment and lab have been scheduled.  Please link lab orders to the lab appointment.  Date of doctor appointment:  10/03/2018  Physical or EP:  Physical  Date of lab appointment:  09/28/2018     Patient resting comfortably in ED stretcher. Bed locked in lowest position. AAO x 3. Respirations even and unlabored at this time. Call bell within reach. Patient instructed to call with any needs or concerns, verbalized understanding. Will continue to monitor.

## 2022-11-08 NOTE — PROGRESS NOTES
Cristobal Ramirez  1965        Subjective     Chief Complaint:    History of Present Illness:  Mr. Cristobal Ramirez is a 57 y.o. male who presents to clinic for est care, refills. Was seeing Dr. HOLDEN JACOBSON- had PSA done 6m ago (1.4, 1.3, 1.1. 0.91). No immediate fam history of prostate cancer, possible grandparent. Has tried flomax in the past. Symptoms not bothering him.     Smoking-vaping, no more cigarettes. Few years ago.     Anxiety- on Wellbutrin 300 mg, Fluoxetine 20 mg. +Trazadone 100 mg. Feeling well.     Anemia- has been anemic before, last Hb 14.6, RBC 4.53.     Dexcovy for prep. No issues. Doing well. [facility]. Checking HIV, Liver function every 6m.     Due for colonoscopy in 2026, last done 2020. 6 yr follow-up per note. Prior colonoscopy in 2016 wanted 3 yr follow-up due to polyps.     No colon cancer in family. No blood in stools, abdominal pain, weight loss, bowel changes.     Review of Systems   Constitutional:  Negative for chills, fever and weight loss.   HENT:  Negative for congestion and sore throat.    Respiratory:  Negative for wheezing.    Cardiovascular:  Negative for leg swelling.   Gastrointestinal:  Negative for abdominal pain, blood in stool, constipation, diarrhea, nausea and vomiting.   Genitourinary:  Negative for dysuria, frequency and urgency.   Skin:  Negative for rash.   Neurological:  Negative for speech change and focal weakness.      PAST HISTORY:     Past Medical History:   Diagnosis Date    Allergy     Anxiety     Depression        Past Surgical History:   Procedure Laterality Date    COLONOSCOPY N/A 9/16/2016    Procedure: COLONOSCOPY;  Surgeon: Dalton Lane MD;  Location: [address]);  Service: Endoscopy;  Laterality: N/A;    COLONOSCOPY N/A 10/24/2016    Procedure: COLONOSCOPY;  Surgeon: Ozzy Wakefield MD;  Location: [address]);  Service: Endoscopy;  Laterality: N/A;    COLONOSCOPY N/A 1/3/2020    Procedure: COLONOSCOPY;  Surgeon:  "Adolph Costello MD;  Location: [facility] ([address] [place] [address]);  Service: Endoscopy;  Laterality: N/A;    RHINOPLASTY TIP  1985       Family History   Problem Relation Age of Onset    Prostate cancer Paternal Grandfather     Melanoma Neg Hx        Social History     Socioeconomic History    Marital status: Single   Occupational History     Employer: [employer]   Tobacco Use    Smoking status: Former     Packs/day: 1.00     Types: Vaping with nicotine, Cigarettes     Start date: 11/9/2022    Smokeless tobacco: Never    Tobacco comments:     >20 pack year hx     Vaping currently    Substance and Sexual Activity    Alcohol use: No    Drug use: No    Sexual activity: Yes     Partners: Male     Birth control/protection: None       MEDICATIONS & ALLERGIES:     Current Outpatient Medications on File Prior to Visit   Medication Sig    DESCOVY 200-25 mg Tab TK 1 T PO D    FLUoxetine 20 MG capsule Take 1 capsule (20 mg total) by mouth once daily.    neomycin-polymyxin-hydrocortisone (CORTISPORIN) 3.5-10,000-1 mg/mL-unit/mL-% otic suspension Place 3 drops into both ears 3 (three) times daily.    traZODone (DESYREL) 100 MG tablet two po qhs    [DISCONTINUED] buPROPion (WELLBUTRIN XL) 300 MG 24 hr tablet Take 1 tablet (300 mg total) by mouth once daily.    varicella-zoster gE-AS01B, PF, (SHINGRIX, PF,) 50 mcg/0.5 mL injection Inject into the muscle. (Patient not taking: Reported on 11/9/2022)     No current facility-administered medications on file prior to visit.       Review of patient's allergies indicates:   Allergen Reactions    Pollen extracts        OBJECTIVE:     Vital Signs:  Vitals:    11/09/22 1336   BP: 118/80   BP Location: Left arm   Patient Position: Sitting   BP Method: Medium (Manual)   Pulse: 71   Temp: 97.6 °F (36.4 °C)   TempSrc: Oral   SpO2: 96%   Weight: 81.6 kg (179 lb 14.3 oz)   Height: 5' 11" (1.803 m)       Body mass index is 25.09 kg/m².     Physical Exam:  Physical Exam  Vitals and nursing note " reviewed.   Constitutional:       General: He is not in acute distress.     Appearance: Normal appearance. He is not ill-appearing, toxic-appearing or diaphoretic.   HENT:      Head: Normocephalic and atraumatic.   Eyes:      General: No scleral icterus.     Conjunctiva/sclera: Conjunctivae normal.   Cardiovascular:      Rate and Rhythm: Normal rate and regular rhythm.      Heart sounds: No murmur heard.  Pulmonary:      Effort: Pulmonary effort is normal. No respiratory distress.      Breath sounds: Normal breath sounds. No wheezing.   Musculoskeletal:         General: Normal range of motion.      Cervical back: Normal range of motion.      Right lower leg: No edema.      Left lower leg: No edema.   Skin:     General: Skin is warm and dry.   Neurological:      General: No focal deficit present.      Mental Status: He is alert and oriented to person, place, and time.   Psychiatric:         Mood and Affect: Mood and affect normal.         Behavior: Behavior normal.          Laboratory  Lab Results   Component Value Date    WBC 5.81 01/20/2022    HGB 14.6 01/20/2022    HCT 43.9 01/20/2022    MCV 97 01/20/2022     01/20/2022     Lab Results   Component Value Date     01/20/2022     01/20/2022    K 3.9 01/20/2022     01/20/2022    CO2 23 01/20/2022    BUN 14 01/20/2022    CREATININE 0.9 01/20/2022    CALCIUM 9.5 01/20/2022    MG 2.2 09/04/2019     No results found for: INR, PROTIME  Lab Results   Component Value Date    HGBA1C 4.8 09/01/2019           Health Maintenance         Date Due Completion Date    Pneumococcal Vaccines (Age 0-64) (2 - PCV) 08/29/2014 8/29/2013    COVID-19 Vaccine (2 - Booster for Nemesio series) 11/16/2020 9/21/2020    Colorectal Cancer Screening 01/03/2023 1/3/2020    TETANUS VACCINE 04/26/2026 4/26/2016    Lipid Panel 01/20/2027 1/20/2022              ASSESSMENT & PLAN:   Mr. Cristobal Ramirez is a 57 y.o. male who was seen today in clinic for est care.      1.  Dysthymia  -     buPROPion (WELLBUTRIN XL) 300 MG 24 hr tablet; Take 1 tablet (300 mg total) by mouth once daily.  Dispense: 90 tablet; Refill: 3--asking for refill    2. Benign prostatic hyperplasia, unspecified whether lower urinary tract symptoms present  -Last PSA 6m ago, not bothering    3. Encounter for screening for lung cancer  -     CT Chest Lung Screening Low Dose; Future; Expected date: 11/09/2022  -     Used to smoke 2 packs per day, >20 pack years, current vaping    4. Former smoker-stopped cigarettes 4 yrs ago    5. History of nicotine vaping  -Counseled to quit    6. Anemia, unspecified type  -Hb wnl last check this year    7. Anxiety  -Doing well       1. Dysthymia    2. Benign prostatic hyperplasia, unspecified whether lower urinary tract symptoms present    3. Encounter for screening for lung cancer    4. Former smoker    5. History of nicotine vaping    6. Anemia, unspecified type    7. Anxiety        RTC in 1 yr or sooner if needed.   HIV testing and refills for PrEp being done by [facility] per pt.    Soila Caputo MD  Internal Medicine

## 2022-11-09 ENCOUNTER — OFFICE VISIT (OUTPATIENT)
Dept: PRIMARY CARE CLINIC | Facility: CLINIC | Age: 57
End: 2022-11-09
Payer: COMMERCIAL

## 2022-11-09 VITALS
TEMPERATURE: 98 F | WEIGHT: 179.88 LBS | HEART RATE: 71 BPM | HEIGHT: 71 IN | DIASTOLIC BLOOD PRESSURE: 80 MMHG | OXYGEN SATURATION: 96 % | BODY MASS INDEX: 25.18 KG/M2 | SYSTOLIC BLOOD PRESSURE: 118 MMHG

## 2022-11-09 DIAGNOSIS — Z87.891 HISTORY OF NICOTINE VAPING: ICD-10-CM

## 2022-11-09 DIAGNOSIS — N40.0 BENIGN PROSTATIC HYPERPLASIA, UNSPECIFIED WHETHER LOWER URINARY TRACT SYMPTOMS PRESENT: ICD-10-CM

## 2022-11-09 DIAGNOSIS — Z12.2 ENCOUNTER FOR SCREENING FOR LUNG CANCER: ICD-10-CM

## 2022-11-09 DIAGNOSIS — Z87.891 FORMER SMOKER: ICD-10-CM

## 2022-11-09 DIAGNOSIS — D64.9 ANEMIA, UNSPECIFIED TYPE: ICD-10-CM

## 2022-11-09 DIAGNOSIS — F34.1 DYSTHYMIA: Primary | ICD-10-CM

## 2022-11-09 DIAGNOSIS — F41.9 ANXIETY: ICD-10-CM

## 2022-11-09 PROBLEM — L03.113 RIGHT ARM CELLULITIS: Status: RESOLVED | Noted: 2019-08-31 | Resolved: 2022-11-09

## 2022-11-09 PROBLEM — M71.121 SEPTIC OLECRANON BURSITIS OF RIGHT ELBOW: Status: RESOLVED | Noted: 2019-09-01 | Resolved: 2022-11-09

## 2022-11-09 PROCEDURE — 3074F SYST BP LT 130 MM HG: CPT | Mod: CPTII,S$GLB,, | Performed by: STUDENT IN AN ORGANIZED HEALTH CARE EDUCATION/TRAINING PROGRAM

## 2022-11-09 PROCEDURE — 3008F PR BODY MASS INDEX (BMI) DOCUMENTED: ICD-10-PCS | Mod: CPTII,S$GLB,, | Performed by: STUDENT IN AN ORGANIZED HEALTH CARE EDUCATION/TRAINING PROGRAM

## 2022-11-09 PROCEDURE — 99396 PR PREVENTIVE VISIT,EST,40-64: ICD-10-PCS | Mod: S$GLB,,, | Performed by: STUDENT IN AN ORGANIZED HEALTH CARE EDUCATION/TRAINING PROGRAM

## 2022-11-09 PROCEDURE — 1159F MED LIST DOCD IN RCRD: CPT | Mod: CPTII,S$GLB,, | Performed by: STUDENT IN AN ORGANIZED HEALTH CARE EDUCATION/TRAINING PROGRAM

## 2022-11-09 PROCEDURE — 99999 PR PBB SHADOW E&M-EST. PATIENT-LVL IV: ICD-10-PCS | Mod: PBBFAC,,, | Performed by: STUDENT IN AN ORGANIZED HEALTH CARE EDUCATION/TRAINING PROGRAM

## 2022-11-09 PROCEDURE — 1159F PR MEDICATION LIST DOCUMENTED IN MEDICAL RECORD: ICD-10-PCS | Mod: CPTII,S$GLB,, | Performed by: STUDENT IN AN ORGANIZED HEALTH CARE EDUCATION/TRAINING PROGRAM

## 2022-11-09 PROCEDURE — 1160F PR REVIEW ALL MEDS BY PRESCRIBER/CLIN PHARMACIST DOCUMENTED: ICD-10-PCS | Mod: CPTII,S$GLB,, | Performed by: STUDENT IN AN ORGANIZED HEALTH CARE EDUCATION/TRAINING PROGRAM

## 2022-11-09 PROCEDURE — 99396 PREV VISIT EST AGE 40-64: CPT | Mod: S$GLB,,, | Performed by: STUDENT IN AN ORGANIZED HEALTH CARE EDUCATION/TRAINING PROGRAM

## 2022-11-09 PROCEDURE — 3008F BODY MASS INDEX DOCD: CPT | Mod: CPTII,S$GLB,, | Performed by: STUDENT IN AN ORGANIZED HEALTH CARE EDUCATION/TRAINING PROGRAM

## 2022-11-09 PROCEDURE — 1160F RVW MEDS BY RX/DR IN RCRD: CPT | Mod: CPTII,S$GLB,, | Performed by: STUDENT IN AN ORGANIZED HEALTH CARE EDUCATION/TRAINING PROGRAM

## 2022-11-09 PROCEDURE — 99999 PR PBB SHADOW E&M-EST. PATIENT-LVL IV: CPT | Mod: PBBFAC,,, | Performed by: STUDENT IN AN ORGANIZED HEALTH CARE EDUCATION/TRAINING PROGRAM

## 2022-11-09 PROCEDURE — 3074F PR MOST RECENT SYSTOLIC BLOOD PRESSURE < 130 MM HG: ICD-10-PCS | Mod: CPTII,S$GLB,, | Performed by: STUDENT IN AN ORGANIZED HEALTH CARE EDUCATION/TRAINING PROGRAM

## 2022-11-09 PROCEDURE — 3079F PR MOST RECENT DIASTOLIC BLOOD PRESSURE 80-89 MM HG: ICD-10-PCS | Mod: CPTII,S$GLB,, | Performed by: STUDENT IN AN ORGANIZED HEALTH CARE EDUCATION/TRAINING PROGRAM

## 2022-11-09 PROCEDURE — 3079F DIAST BP 80-89 MM HG: CPT | Mod: CPTII,S$GLB,, | Performed by: STUDENT IN AN ORGANIZED HEALTH CARE EDUCATION/TRAINING PROGRAM

## 2022-11-09 RX ORDER — BUPROPION HYDROCHLORIDE 300 MG/1
300 TABLET ORAL DAILY
Qty: 90 TABLET | Refills: 3 | Status: SHIPPED | OUTPATIENT
Start: 2022-11-09 | End: 2022-11-10 | Stop reason: SDUPTHER

## 2022-11-10 ENCOUNTER — PATIENT MESSAGE (OUTPATIENT)
Dept: PRIMARY CARE CLINIC | Facility: CLINIC | Age: 57
End: 2022-11-10
Payer: COMMERCIAL

## 2022-11-15 DIAGNOSIS — F41.9 ANXIETY: ICD-10-CM

## 2022-11-15 DIAGNOSIS — F32.A DEPRESSION, UNSPECIFIED DEPRESSION TYPE: ICD-10-CM

## 2022-11-15 RX ORDER — FLUOXETINE HYDROCHLORIDE 20 MG/1
20 CAPSULE ORAL DAILY
Qty: 90 CAPSULE | Refills: 3 | Status: SHIPPED | OUTPATIENT
Start: 2022-11-15 | End: 2023-05-12 | Stop reason: SDUPTHER

## 2022-11-15 NOTE — PROGRESS NOTES
Per last note, prescription appears to have been sent. Pt states it was never received. Will send again to [facility].

## 2022-11-15 NOTE — TELEPHONE ENCOUNTER
Hi,     We can let him know the Fluoxetine was showing it was sent to [facility] as well on my end but I will send again. Not sure why they are not seeing our electronic prescriptions.     Thanks,     Dr. Caputo

## 2023-05-12 ENCOUNTER — PATIENT MESSAGE (OUTPATIENT)
Dept: PRIMARY CARE CLINIC | Facility: CLINIC | Age: 58
End: 2023-05-12
Payer: COMMERCIAL

## 2023-05-12 DIAGNOSIS — F32.A DEPRESSION, UNSPECIFIED DEPRESSION TYPE: ICD-10-CM

## 2023-05-12 DIAGNOSIS — F41.9 ANXIETY: ICD-10-CM

## 2023-05-12 DIAGNOSIS — F34.1 DYSTHYMIA: ICD-10-CM

## 2023-05-12 RX ORDER — EMTRICITABINE AND TENOFOVIR DISOPROXIL FUMARATE 200; 300 MG/1; MG/1
1 TABLET, FILM COATED ORAL
COMMUNITY
Start: 2023-04-18 | End: 2024-03-27

## 2023-05-12 RX ORDER — BUPROPION HYDROCHLORIDE 300 MG/1
300 TABLET ORAL DAILY
Qty: 90 TABLET | Refills: 3 | Status: SHIPPED | OUTPATIENT
Start: 2023-05-12 | End: 2024-03-27 | Stop reason: SDUPTHER

## 2023-05-12 RX ORDER — FLUOXETINE HYDROCHLORIDE 20 MG/1
20 CAPSULE ORAL DAILY
Qty: 90 CAPSULE | Refills: 3 | Status: SHIPPED | OUTPATIENT
Start: 2023-05-12 | End: 2024-03-27 | Stop reason: SDUPTHER

## 2023-05-12 RX ORDER — TRAZODONE HYDROCHLORIDE 100 MG/1
TABLET ORAL
Qty: 180 TABLET | Refills: 3 | Status: SHIPPED | OUTPATIENT
Start: 2023-05-12 | End: 2024-03-27 | Stop reason: SDUPTHER

## 2023-05-12 RX ORDER — TRAZODONE HYDROCHLORIDE 100 MG/1
TABLET ORAL
Qty: 180 TABLET | Refills: 3 | OUTPATIENT
Start: 2023-05-12

## 2023-05-12 NOTE — TELEPHONE ENCOUNTER
----- Message from Chio Brannon sent at 5/12/2023 11:26 AM CDT -----  Contact: [phone]  Requesting an RX refill or new RX.new  Is this a refill or new RX:  new    RX name and strength (copy/paste from chart):  traZODone (DESYREL) 100 MG tablet 180 tablet     Is this a 30 day or 90 day RX: 30    Pharmacy name and phone # (copy/paste from chart):      [id number] - [place], [place] - [address] AT [facility] & [address]  [facility] [address]  [facility] [address]  Phone: [phone] Fax: [phone]

## 2024-03-12 ENCOUNTER — TELEPHONE (OUTPATIENT)
Dept: PRIMARY CARE CLINIC | Facility: CLINIC | Age: 59
End: 2024-03-12
Payer: COMMERCIAL

## 2024-03-12 DIAGNOSIS — Z00.00 ANNUAL PHYSICAL EXAM: Primary | ICD-10-CM

## 2024-03-12 NOTE — TELEPHONE ENCOUNTER
----- Message from Lorrie Ching sent at 3/12/2024  9:34 AM CDT -----  Contact: [phone]@[email or web address]  Good morning patient would like to request lab orders for his upcoming annual apt on 3/27. Please give patient a call back  [phone]

## 2024-03-27 ENCOUNTER — OFFICE VISIT (OUTPATIENT)
Dept: PRIMARY CARE CLINIC | Facility: CLINIC | Age: 59
End: 2024-03-27
Payer: COMMERCIAL

## 2024-03-27 VITALS
SYSTOLIC BLOOD PRESSURE: 120 MMHG | WEIGHT: 173.5 LBS | OXYGEN SATURATION: 96 % | HEART RATE: 70 BPM | HEIGHT: 71 IN | BODY MASS INDEX: 24.29 KG/M2 | DIASTOLIC BLOOD PRESSURE: 76 MMHG

## 2024-03-27 DIAGNOSIS — Z87.891 FORMER SMOKER: ICD-10-CM

## 2024-03-27 DIAGNOSIS — F34.1 DYSTHYMIA: ICD-10-CM

## 2024-03-27 DIAGNOSIS — R59.9 SWOLLEN LYMPH NODES: ICD-10-CM

## 2024-03-27 DIAGNOSIS — F32.A DEPRESSION, UNSPECIFIED DEPRESSION TYPE: ICD-10-CM

## 2024-03-27 DIAGNOSIS — Z79.899 ON PRE-EXPOSURE PROPHYLAXIS FOR HIV: ICD-10-CM

## 2024-03-27 DIAGNOSIS — F41.9 ANXIETY: ICD-10-CM

## 2024-03-27 DIAGNOSIS — Z00.00 ANNUAL PHYSICAL EXAM: Primary | ICD-10-CM

## 2024-03-27 DIAGNOSIS — H61.92 SKIN LESION OF LEFT EAR: ICD-10-CM

## 2024-03-27 PROBLEM — D49.9 PRECANCEROUS LESION: Status: RESOLVED | Noted: 2017-05-08 | Resolved: 2024-03-27

## 2024-03-27 PROCEDURE — 3074F SYST BP LT 130 MM HG: CPT | Mod: CPTII,S$GLB,, | Performed by: STUDENT IN AN ORGANIZED HEALTH CARE EDUCATION/TRAINING PROGRAM

## 2024-03-27 PROCEDURE — 99999 PR PBB SHADOW E&M-EST. PATIENT-LVL IV: CPT | Mod: PBBFAC,,, | Performed by: STUDENT IN AN ORGANIZED HEALTH CARE EDUCATION/TRAINING PROGRAM

## 2024-03-27 PROCEDURE — 1160F RVW MEDS BY RX/DR IN RCRD: CPT | Mod: CPTII,S$GLB,, | Performed by: STUDENT IN AN ORGANIZED HEALTH CARE EDUCATION/TRAINING PROGRAM

## 2024-03-27 PROCEDURE — 1159F MED LIST DOCD IN RCRD: CPT | Mod: CPTII,S$GLB,, | Performed by: STUDENT IN AN ORGANIZED HEALTH CARE EDUCATION/TRAINING PROGRAM

## 2024-03-27 PROCEDURE — 3008F BODY MASS INDEX DOCD: CPT | Mod: CPTII,S$GLB,, | Performed by: STUDENT IN AN ORGANIZED HEALTH CARE EDUCATION/TRAINING PROGRAM

## 2024-03-27 PROCEDURE — 99396 PREV VISIT EST AGE 40-64: CPT | Mod: S$GLB,,, | Performed by: STUDENT IN AN ORGANIZED HEALTH CARE EDUCATION/TRAINING PROGRAM

## 2024-03-27 PROCEDURE — 3078F DIAST BP <80 MM HG: CPT | Mod: CPTII,S$GLB,, | Performed by: STUDENT IN AN ORGANIZED HEALTH CARE EDUCATION/TRAINING PROGRAM

## 2024-03-27 RX ORDER — FLUOXETINE HYDROCHLORIDE 20 MG/1
20 CAPSULE ORAL DAILY
Qty: 90 CAPSULE | Refills: 3 | Status: SHIPPED | OUTPATIENT
Start: 2024-03-27

## 2024-03-27 RX ORDER — TRAZODONE HYDROCHLORIDE 100 MG/1
TABLET ORAL
Qty: 180 TABLET | Refills: 3 | Status: SHIPPED | OUTPATIENT
Start: 2024-03-27

## 2024-03-27 RX ORDER — BUPROPION HYDROCHLORIDE 300 MG/1
300 TABLET ORAL DAILY
Qty: 90 TABLET | Refills: 3 | Status: SHIPPED | OUTPATIENT
Start: 2024-03-27

## 2024-03-27 NOTE — PROGRESS NOTES
Cristobal Ramirez  1965        Subjective     Chief Complaint: Annual    History of Present Illness:  Mr. Cristobal Ramirez is a 58 y.o. male who presents to clinic for annual.    Anxiety- on Wellbutrin 300 mg, Fluoxetine 20 mg. +Trazadone 100 mg. Feeling well. Would like refills.     Dexcovy for prep. No issues. Doing well. [facility]. Checking HIV with them every 6m. Would like to continue following with them for maintenance.     Denies fevers, no weight loss that is unintentional (has been trying). Some fatigue. No change in Bms. No cough.     Answers submitted by the patient for this visit:  Review of Systems Questionnaire (Submitted on 3/20/2024)  activity change: No  unexpected weight change: No  rhinorrhea: No  trouble swallowing: No  visual disturbance: No  chest tightness: No  polyuria: No  difficulty urinating: No  joint swelling: No  arthralgias: No  confusion: No  dysphoric mood: No      Review of Systems   Constitutional:  Positive for weight loss (intentional-avoiding baked goods, riding bike). Negative for chills, fever and malaise/fatigue.   HENT:  Negative for hearing loss.    Eyes:  Negative for discharge.   Respiratory:  Negative for wheezing.    Cardiovascular:  Negative for chest pain.   Gastrointestinal:  Negative for abdominal pain, blood in stool, constipation, diarrhea and vomiting.   Genitourinary:  Negative for hematuria and urgency.   Musculoskeletal:  Negative for neck pain.   Neurological:  Negative for weakness and headaches.   Endo/Heme/Allergies:  Negative for polydipsia.   Psychiatric/Behavioral:  The patient is not nervous/anxious.         PAST HISTORY:     Past Medical History:   Diagnosis Date    Allergy     Anxiety     Depression        Past Surgical History:   Procedure Laterality Date    COLONOSCOPY N/A 9/16/2016    Procedure: COLONOSCOPY;  Surgeon: Dalton Lane MD;  Location: [address]);  Service: Endoscopy;  Laterality: N/A;    COLONOSCOPY N/A  10/24/2016    Procedure: COLONOSCOPY;  Surgeon: Ozzy Wakefield MD;  [facility]  Service: Endoscopy;  Laterality: N/A;    COLONOSCOPY N/A 1/3/2020    Procedure: COLONOSCOPY;  Surgeon: Adolph Costello MD;  [facility]);  Service: Endoscopy;  Laterality: N/A;    RHINOPLASTY TIP  1985       Family History   Problem Relation Age of Onset    Prostate cancer Paternal Grandfather     Melanoma Neg Hx        Social History     Socioeconomic History    Marital status: Single   Occupational History     Employer: [employer]   Tobacco Use    Smoking status: Former     Current packs/day: 1.00     Types: Vaping with nicotine, Cigarettes     Start date: 11/9/2022    Smokeless tobacco: Never    Tobacco comments:     >20 pack year hx     Vaping currently    Substance and Sexual Activity    Alcohol use: No    Drug use: No    Sexual activity: Yes     Partners: Male     Birth control/protection: None     Social Determinants of Health     Financial Resource Strain: Low Risk  (3/20/2024)    Overall Financial Resource Strain (CARDIA)     Difficulty of Paying Living Expenses: Not very hard   Food Insecurity: No Food Insecurity (3/20/2024)    Hunger Vital Sign     Worried About Running Out of Food in the Last Year: Never true     Ran Out of Food in the Last Year: Never true   Transportation Needs: No Transportation Needs (3/20/2024)    PRAPARE - Transportation     Lack of Transportation (Medical): No     Lack of Transportation (Non-Medical): No   Physical Activity: Sufficiently Active (3/20/2024)    Exercise Vital Sign     Days of Exercise per Week: 6 days     Minutes of Exercise per Session: 60 min   Stress: Stress Concern Present (3/20/2024)    [employer] [place] [employer] - Occupational Stress Questionnaire     Feeling of Stress : To some extent   Social Connections: Unknown (3/20/2024)    Social Connection and Isolation Panel [NHANES]     Frequency of Communication with  "Friends and Family: Three times a week     Frequency of Social Gatherings with Friends and Family: Twice a week     Active Member of Clubs or Organizations: No     Attends Club or Organization Meetings: Patient declined     Marital Status: Never    Housing Stability: Low Risk  (3/20/2024)    Housing Stability Vital Sign     Unable to Pay for Housing in the Last Year: No     Number of Places Lived in the Last Year: 1     Unstable Housing in the Last Year: No       MEDICATIONS & ALLERGIES:     Current Outpatient Medications on File Prior to Visit   Medication Sig    DESCOVY 200-25 mg Tab TK 1 T PO D    [DISCONTINUED] buPROPion (WELLBUTRIN XL) 300 MG 24 hr tablet Take 1 tablet (300 mg total) by mouth once daily.    [DISCONTINUED] FLUoxetine 20 MG capsule Take 1 capsule (20 mg total) by mouth once daily.    [DISCONTINUED] traZODone (DESYREL) 100 MG tablet two po qhs    [DISCONTINUED] emtricitabine-tenofovir 200-300 mg (TRUVADA) 200-300 mg Tab Take 1 tablet by mouth.    [DISCONTINUED] neomycin-polymyxin-hydrocortisone (CORTISPORIN) 3.5-10,000-1 mg/mL-unit/mL-% otic suspension Place 3 drops into both ears 3 (three) times daily.    [DISCONTINUED] smallpox,monkeypox live vac PF (JYNNEOS) Susp To be administered by pharmacist    [DISCONTINUED] varicella-zoster [id number], PF, (SHINGRIX, PF,) 50 mcg/0.5 mL injection Inject into the muscle. (Patient not taking: Reported on 11/9/2022)     No current facility-administered medications on file prior to visit.       Review of patient's allergies indicates:   Allergen Reactions    Pollen extracts        OBJECTIVE:     Vital Signs:  Vitals:    03/27/24 1359   BP: 120/76   BP Location: Left arm   Patient Position: Sitting   BP Method: Medium (Manual)   Pulse: 70   SpO2: 96%   Weight: 78.7 kg (173 lb 8 oz)   Height: 5' 11" (1.803 m)       Body mass index is 24.2 kg/m².     Physical Exam:  Physical Exam  Vitals and nursing note reviewed.   Constitutional:       General: He is not " "in acute distress.     Appearance: Normal appearance. He is not ill-appearing, toxic-appearing or diaphoretic.   HENT:      Head: Normocephalic and atraumatic.        Comments: Small area of dry skin with thickening around right ear lobe  Some facial redness around left temple area     Right Ear: Tympanic membrane, ear canal and external ear normal. There is no impacted cerumen.      Left Ear: Tympanic membrane, ear canal and external ear normal. There is no impacted cerumen.      Mouth/Throat:      Mouth: Mucous membranes are moist.      Pharynx: No oropharyngeal exudate or posterior oropharyngeal erythema.   Eyes:      General: No scleral icterus.     Conjunctiva/sclera: Conjunctivae normal.   Neck:        Comments: Left sided circular, non tender, fixed palpated nodule   Cardiovascular:      Rate and Rhythm: Normal rate and regular rhythm.      Heart sounds: Normal heart sounds. No murmur heard.  Pulmonary:      Effort: Pulmonary effort is normal. No respiratory distress.   Musculoskeletal:         General: Normal range of motion.      Cervical back: Normal range of motion and neck supple. No rigidity or tenderness.      Right lower leg: No edema.      Left lower leg: No edema.   Skin:     General: Skin is warm and dry.   Neurological:      Mental Status: He is alert and oriented to person, place, and time. Mental status is at baseline.   Psychiatric:         Mood and Affect: Mood normal.         Behavior: Behavior normal.            Laboratory  Lab Results   Component Value Date    WBC 5.81 01/20/2022    HGB 14.6 01/20/2022    HCT 43.9 01/20/2022    MCV 97 01/20/2022     01/20/2022     Lab Results   Component Value Date     01/20/2022     01/20/2022    K 3.9 01/20/2022     01/20/2022    CO2 23 01/20/2022    BUN 14 01/20/2022    CREATININE 0.9 01/20/2022    CALCIUM 9.5 01/20/2022    MG 2.2 09/04/2019     No results found for: "INR", "PROTIME"  Lab Results   Component Value Date    HGBA1C " 4.8 09/01/2019           Health Maintenance         Date Due Completion Date    Monkeypox Vaccine (2 - Risk 2-dose series) 01/02/2023 12/5/2022    PROSTATE-SPECIFIC ANTIGEN 01/20/2023 1/20/2022    COVID-19 Vaccine (4 - 2023-24 season) 09/01/2023 10/7/2022    Colorectal Cancer Screening 01/03/2026 1/3/2020    TETANUS VACCINE 04/26/2026 4/26/2016    Lipid Panel 01/20/2027 1/20/2022              ASSESSMENT & PLAN:   Mr. Cristobal Ramirez is a 58 y.o. male who was seen today in clinic for annual.       1. Annual physical exam    2. Skin lesion of left ear  -     Ambulatory referral/consult to Dermatology; Future; Expected date: 04/03/2024    3. Swollen lymph nodes  -     US Soft Tissue Head Neck; Future; Expected date: 03/27/2024    4. Depression, unspecified depression type  -     FLUoxetine 20 MG capsule; Take 1 capsule (20 mg total) by mouth once daily.  Dispense: 90 capsule; Refill: 3  -     traZODone (DESYREL) 100 MG tablet; two po qhs  Dispense: 180 tablet; Refill: 3    5. Dysthymia  -     buPROPion (WELLBUTRIN XL) 300 MG 24 hr tablet; Take 1 tablet (300 mg total) by mouth once daily.  Dispense: 90 tablet; Refill: 3    6. Anxiety  -     FLUoxetine 20 MG capsule; Take 1 capsule (20 mg total) by mouth once daily.  Dispense: 90 capsule; Refill: 3  -     traZODone (DESYREL) 100 MG tablet; two po qhs  Dispense: 180 tablet; Refill: 3    7. On pre-exposure prophylaxis for HIV    8. Former smoker           Soila Caputo MD  Internal Medicine         Portions of this note may have been generated using voice recognition software.  Please excuse any spelling/grammatical errors. Occasional wrong-word or sound-a-like substitutions may have also occurred due to the inherent limitations of voice recognition software. Please read the chart carefully and recognize, using context, where substitutions have occurred.

## 2024-04-01 ENCOUNTER — LAB VISIT (OUTPATIENT)
Dept: LAB | Facility: HOSPITAL | Age: 59
End: 2024-04-01
Payer: COMMERCIAL

## 2024-04-01 DIAGNOSIS — Z00.00 ANNUAL PHYSICAL EXAM: ICD-10-CM

## 2024-04-01 LAB
ALBUMIN SERPL BCP-MCNC: 4 G/DL (ref 3.5–5.2)
ALP SERPL-CCNC: 86 U/L (ref 55–135)
ALT SERPL W/O P-5'-P-CCNC: 24 U/L (ref 10–44)
ANION GAP SERPL CALC-SCNC: 7 MMOL/L (ref 8–16)
AST SERPL-CCNC: 19 U/L (ref 10–40)
BASOPHILS # BLD AUTO: 0.06 K/UL (ref 0–0.2)
BASOPHILS NFR BLD: 0.9 % (ref 0–1.9)
BILIRUB SERPL-MCNC: 0.4 MG/DL (ref 0.1–1)
BUN SERPL-MCNC: 16 MG/DL (ref 6–20)
CALCIUM SERPL-MCNC: 8.9 MG/DL (ref 8.7–10.5)
CHLORIDE SERPL-SCNC: 108 MMOL/L (ref 95–110)
CHOLEST SERPL-MCNC: 163 MG/DL (ref 120–199)
CHOLEST/HDLC SERPL: 4.7 {RATIO} (ref 2–5)
CO2 SERPL-SCNC: 25 MMOL/L (ref 23–29)
COMPLEXED PSA SERPL-MCNC: 1.5 NG/ML (ref 0–4)
CREAT SERPL-MCNC: 1 MG/DL (ref 0.5–1.4)
DIFFERENTIAL METHOD BLD: ABNORMAL
EOSINOPHIL # BLD AUTO: 0.1 K/UL (ref 0–0.5)
EOSINOPHIL NFR BLD: 2.2 % (ref 0–8)
ERYTHROCYTE [DISTWIDTH] IN BLOOD BY AUTOMATED COUNT: 11.7 % (ref 11.5–14.5)
EST. GFR  (NO RACE VARIABLE): >60 ML/MIN/1.73 M^2
ESTIMATED AVG GLUCOSE: 97 MG/DL (ref 68–131)
GLUCOSE SERPL-MCNC: 94 MG/DL (ref 70–110)
HBA1C MFR BLD: 5 % (ref 4–5.6)
HCT VFR BLD AUTO: 43.2 % (ref 40–54)
HDLC SERPL-MCNC: 35 MG/DL (ref 40–75)
HDLC SERPL: 21.5 % (ref 20–50)
HGB BLD-MCNC: 14.5 G/DL (ref 14–18)
IMM GRANULOCYTES # BLD AUTO: 0.02 K/UL (ref 0–0.04)
IMM GRANULOCYTES NFR BLD AUTO: 0.3 % (ref 0–0.5)
LDLC SERPL CALC-MCNC: 107.4 MG/DL (ref 63–159)
LYMPHOCYTES # BLD AUTO: 1.4 K/UL (ref 1–4.8)
LYMPHOCYTES NFR BLD: 20.9 % (ref 18–48)
MCH RBC QN AUTO: 32.1 PG (ref 27–31)
MCHC RBC AUTO-ENTMCNC: 33.6 G/DL (ref 32–36)
MCV RBC AUTO: 96 FL (ref 82–98)
MONOCYTES # BLD AUTO: 0.7 K/UL (ref 0.3–1)
MONOCYTES NFR BLD: 10.7 % (ref 4–15)
NEUTROPHILS # BLD AUTO: 4.2 K/UL (ref 1.8–7.7)
NEUTROPHILS NFR BLD: 65 % (ref 38–73)
NONHDLC SERPL-MCNC: 128 MG/DL
NRBC BLD-RTO: 0 /100 WBC
PLATELET # BLD AUTO: 239 K/UL (ref 150–450)
PMV BLD AUTO: 9.9 FL (ref 9.2–12.9)
POTASSIUM SERPL-SCNC: 4.3 MMOL/L (ref 3.5–5.1)
PROT SERPL-MCNC: 6.4 G/DL (ref 6–8.4)
RBC # BLD AUTO: 4.52 M/UL (ref 4.6–6.2)
SODIUM SERPL-SCNC: 140 MMOL/L (ref 136–145)
TRIGL SERPL-MCNC: 103 MG/DL (ref 30–150)
TSH SERPL DL<=0.005 MIU/L-ACNC: 1.1 UIU/ML (ref 0.4–4)
WBC # BLD AUTO: 6.47 K/UL (ref 3.9–12.7)

## 2024-04-01 PROCEDURE — 85025 COMPLETE CBC W/AUTO DIFF WBC: CPT | Performed by: STUDENT IN AN ORGANIZED HEALTH CARE EDUCATION/TRAINING PROGRAM

## 2024-04-01 PROCEDURE — 83036 HEMOGLOBIN GLYCOSYLATED A1C: CPT | Performed by: STUDENT IN AN ORGANIZED HEALTH CARE EDUCATION/TRAINING PROGRAM

## 2024-04-01 PROCEDURE — 80053 COMPREHEN METABOLIC PANEL: CPT | Performed by: STUDENT IN AN ORGANIZED HEALTH CARE EDUCATION/TRAINING PROGRAM

## 2024-04-01 PROCEDURE — 36415 COLL VENOUS BLD VENIPUNCTURE: CPT | Mod: PN | Performed by: STUDENT IN AN ORGANIZED HEALTH CARE EDUCATION/TRAINING PROGRAM

## 2024-04-01 PROCEDURE — 84443 ASSAY THYROID STIM HORMONE: CPT | Performed by: STUDENT IN AN ORGANIZED HEALTH CARE EDUCATION/TRAINING PROGRAM

## 2024-04-01 PROCEDURE — 84153 ASSAY OF PSA TOTAL: CPT | Performed by: STUDENT IN AN ORGANIZED HEALTH CARE EDUCATION/TRAINING PROGRAM

## 2024-04-01 PROCEDURE — 80061 LIPID PANEL: CPT | Performed by: STUDENT IN AN ORGANIZED HEALTH CARE EDUCATION/TRAINING PROGRAM

## 2024-04-16 ENCOUNTER — OFFICE VISIT (OUTPATIENT)
Dept: DERMATOLOGY | Facility: CLINIC | Age: 59
End: 2024-04-16
Payer: COMMERCIAL

## 2024-04-16 DIAGNOSIS — L57.0 ACTINIC KERATOSES: Primary | ICD-10-CM

## 2024-04-16 DIAGNOSIS — H61.92 SKIN LESION OF LEFT EAR: ICD-10-CM

## 2024-04-16 PROCEDURE — 17000 DESTRUCT PREMALG LESION: CPT | Mod: S$GLB,,, | Performed by: DERMATOLOGY

## 2024-04-16 PROCEDURE — 17003 DESTRUCT PREMALG LES 2-14: CPT | Mod: S$GLB,,, | Performed by: DERMATOLOGY

## 2024-04-16 PROCEDURE — 3044F HG A1C LEVEL LT 7.0%: CPT | Mod: CPTII,S$GLB,, | Performed by: DERMATOLOGY

## 2024-04-16 PROCEDURE — 1159F MED LIST DOCD IN RCRD: CPT | Mod: CPTII,S$GLB,, | Performed by: DERMATOLOGY

## 2024-04-16 PROCEDURE — 99999 PR PBB SHADOW E&M-EST. PATIENT-LVL II: CPT | Mod: PBBFAC,,, | Performed by: DERMATOLOGY

## 2024-04-16 PROCEDURE — 99203 OFFICE O/P NEW LOW 30 MIN: CPT | Mod: 25,S$GLB,, | Performed by: DERMATOLOGY

## 2024-04-16 PROCEDURE — 1160F RVW MEDS BY RX/DR IN RCRD: CPT | Mod: CPTII,S$GLB,, | Performed by: DERMATOLOGY

## 2024-04-16 NOTE — PROGRESS NOTES
Subjective:      Patient ID:  Cristobal Ramirez is a 58 y.o. male who presents for No chief complaint on file.    Patient is a 58M with PMHx of BPH, anxiety, and on Prep(descovy) who presents to acute care dermatology clinic for evaluation of lesion on ear.     Patient used to be seen by outside dermatologist, who did cryo for lesions on the face in the past. Patient states that his PCP noticed scaly lesions on his right helix, prompting today's visit. Patient states these are asymptomatic. Patient also reports rough scaly spots to bilateral sideburn areas and cheeks. Has bought OTC creams and ointments without relief. Denied other concerns today.       Review of Systems   Constitutional: Negative.    Respiratory: Negative.     Cardiovascular: Negative.    Skin:  Positive for dry skin.       Objective:   Physical Exam   Constitutional: He appears well-developed and well-nourished.   Neurological: He is alert and oriented to person, place, and time.   Psychiatric: He has a normal mood and affect.   Skin:   Areas Examined (abnormalities noted in diagram):   Scalp / Hair Palpated and Inspected  Head / Face Inspection Performed            Diagram Legend     Erythematous scaling macule/papule c/w actinic keratosis       Vascular papule c/w angioma      Pigmented verrucoid papule/plaque c/w seborrheic keratosis      Yellow umbilicated papule c/w sebaceous hyperplasia      Irregularly shaped tan macule c/w lentigo     1-2 mm smooth white papules consistent with Milia      Movable subcutaneous cyst with punctum c/w epidermal inclusion cyst      Subcutaneous movable cyst c/w pilar cyst      Firm pink to brown papule c/w dermatofibroma      Pedunculated fleshy papule(s) c/w skin tag(s)      Evenly pigmented macule c/w junctional nevus     Mildly variegated pigmented, slightly irregular-bordered macule c/w mildly atypical nevus      Flesh colored to evenly pigmented papule c/w intradermal nevus       Pink pearly  papule/plaque c/w basal cell carcinoma      Erythematous hyperkeratotic cursted plaque c/w SCC      Surgical scar with no sign of skin cancer recurrence      Open and closed comedones      Inflammatory papules and pustules      Verrucoid papule consistent consistent with wart     Erythematous eczematous patches and plaques     Dystrophic onycholytic nail with subungual debris c/w onychomycosis     Umbilicated papule    Erythematous-base heme-crusted tan verrucoid plaque consistent with inflamed seborrheic keratosis     Erythematous Silvery Scaling Plaque c/w Psoriasis     See annotation      Assessment / Plan:        Diagnoses and all orders for this visit:    Actinic keratoses  - noted with 2x Aks of right helix, 2x left helix  - scattered Aks on bilateral cheeks, sideburn areas  - treatment options discussed.   - after VC, LN2 applied to 4x lesions of the bilateral ears, Wound care discussed.   - prescribed efudex+calcipotriene from [facility] for patient to use to bilateral cheeks and sideburn area for 5-7 days. Discussed side effects and expected results. Patient stated understanding.   - RTC 1-2 months for UBSE and assess efudex+calcipotriene response     Cryosurgery Procedure Note    Verbal consent from the patient is obtained including, but not limited to, risk of hypopigmentation/hyperpigmentation, scar, recurrence of lesion. The patient is aware of the precancerous quality and need for treatment of these lesions. Liquid nitrogen cryosurgery is applied to the 4 actinic keratoses, as detailed in the physical exam, to produce a freeze injury. The patient is aware that blisters may form and is instructed on wound care with gentle cleansing and use of vaseline ointment to keep moist until healed. The patient is supplied a handout on cryosurgery and is instructed to call if lesions do not completely resolve.             No follow-ups on file.

## 2024-04-17 NOTE — PROGRESS NOTES
I have seen the patient, reviewed the Resident's progress note. I have personally interviewed and examined the patient at bedside and agree with the findings.     Lauren Cuellar MD  [facility]

## 2024-05-27 ENCOUNTER — OFFICE VISIT (OUTPATIENT)
Dept: DERMATOLOGY | Facility: CLINIC | Age: 59
End: 2024-05-27
Payer: COMMERCIAL

## 2024-05-27 DIAGNOSIS — L82.1 SEBORRHEIC KERATOSES: ICD-10-CM

## 2024-05-27 DIAGNOSIS — D22.9 MULTIPLE BENIGN NEVI: ICD-10-CM

## 2024-05-27 DIAGNOSIS — D18.01 CHERRY ANGIOMA: ICD-10-CM

## 2024-05-27 DIAGNOSIS — L81.4 LENTIGO: ICD-10-CM

## 2024-05-27 DIAGNOSIS — Z12.83 SCREENING EXAM FOR SKIN CANCER: ICD-10-CM

## 2024-05-27 DIAGNOSIS — L57.0 ACTINIC KERATOSIS: Primary | ICD-10-CM

## 2024-05-27 PROCEDURE — 99999 PR PBB SHADOW E&M-EST. PATIENT-LVL III: CPT | Mod: PBBFAC,,, | Performed by: STUDENT IN AN ORGANIZED HEALTH CARE EDUCATION/TRAINING PROGRAM

## 2024-05-27 PROCEDURE — 1160F RVW MEDS BY RX/DR IN RCRD: CPT | Mod: CPTII,S$GLB,, | Performed by: STUDENT IN AN ORGANIZED HEALTH CARE EDUCATION/TRAINING PROGRAM

## 2024-05-27 PROCEDURE — 17000 DESTRUCT PREMALG LESION: CPT | Mod: S$GLB,,, | Performed by: STUDENT IN AN ORGANIZED HEALTH CARE EDUCATION/TRAINING PROGRAM

## 2024-05-27 PROCEDURE — 1159F MED LIST DOCD IN RCRD: CPT | Mod: CPTII,S$GLB,, | Performed by: STUDENT IN AN ORGANIZED HEALTH CARE EDUCATION/TRAINING PROGRAM

## 2024-05-27 PROCEDURE — 3044F HG A1C LEVEL LT 7.0%: CPT | Mod: CPTII,S$GLB,, | Performed by: STUDENT IN AN ORGANIZED HEALTH CARE EDUCATION/TRAINING PROGRAM

## 2024-05-27 PROCEDURE — 99213 OFFICE O/P EST LOW 20 MIN: CPT | Mod: 25,S$GLB,, | Performed by: STUDENT IN AN ORGANIZED HEALTH CARE EDUCATION/TRAINING PROGRAM

## 2024-05-27 PROCEDURE — 17003 DESTRUCT PREMALG LES 2-14: CPT | Mod: S$GLB,,, | Performed by: STUDENT IN AN ORGANIZED HEALTH CARE EDUCATION/TRAINING PROGRAM

## 2024-05-27 NOTE — PROGRESS NOTES
Subjective:      Patient ID:  Cristobal Ramirez is a 58 y.o. male who presents for   Chief Complaint   Patient presents with    Skin Check     Ubse     History of Present Illness: The patient presents for skin check.    The patient was last seen on: 4/16/24 in acute derm clinic for cryosurgery to actinic keratoses which have resolved and efudex tx to b/l cheeks and sideburn bid x 6 days.  No h/o nmsc or mm.  Other skin complaints: none          Review of Systems   Skin:  Positive for daily sunscreen use, activity-related sunscreen use and wears hat. Negative for recent sunburn.   Hematologic/Lymphatic: Does not bruise/bleed easily.       Objective:   Physical Exam   Constitutional: He appears well-developed and well-nourished. No distress.   Neurological: He is alert and oriented to person, place, and time. He is not disoriented.   Psychiatric: He has a normal mood and affect.   Skin:   Areas Examined (abnormalities noted in diagram):   Scalp / Hair Palpated and Inspected  Head / Face Inspection Performed  Neck Inspection Performed  Chest / Axilla Inspection Performed  Abdomen Inspection Performed  Back Inspection Performed  RUE Inspected  LUE Inspection Performed                 Diagram Legend     Erythematous scaling macule/papule c/w actinic keratosis       Vascular papule c/w angioma      Pigmented verrucoid papule/plaque c/w seborrheic keratosis      Yellow umbilicated papule c/w sebaceous hyperplasia      Irregularly shaped tan macule c/w lentigo     1-2 mm smooth white papules consistent with Milia      Movable subcutaneous cyst with punctum c/w epidermal inclusion cyst      Subcutaneous movable cyst c/w pilar cyst      Firm pink to brown papule c/w dermatofibroma      Pedunculated fleshy papule(s) c/w skin tag(s)      Evenly pigmented macule c/w junctional nevus     Mildly variegated pigmented, slightly irregular-bordered macule c/w mildly atypical nevus      Flesh colored to evenly pigmented papule c/w  intradermal nevus       Pink pearly papule/plaque c/w basal cell carcinoma      Erythematous hyperkeratotic cursted plaque c/w SCC      Surgical scar with no sign of skin cancer recurrence      Open and closed comedones      Inflammatory papules and pustules      Verrucoid papule consistent consistent with wart     Erythematous eczematous patches and plaques     Dystrophic onycholytic nail with subungual debris c/w onychomycosis     Umbilicated papule    Erythematous-base heme-crusted tan verrucoid plaque consistent with inflamed seborrheic keratosis     Erythematous Silvery Scaling Plaque c/w Psoriasis     See annotation      Assessment / Plan:        Actinic keratosis  Cryosurgery Procedure Note    Verbal consent from the patient is obtained including, but not limited to, risk of hypopigmentation/hyperpigmentation, scar, recurrence of lesion. The patient is aware of the precancerous quality and need for treatment of these lesions. Liquid nitrogen cryosurgery is applied to the 4 actinic keratoses, as detailed in the physical exam, to produce a freeze injury. The patient is aware that blisters may form and is instructed on wound care with gentle cleansing and use of vaseline ointment to keep moist until healed. The patient is supplied a handout on cryosurgery and is instructed to call if lesions do not completely resolve.    Seborrheic keratoses  Multiple benign nevi  Lentigo  Cherry angioma  Reassurance given to patient. No treatment is necessary.   Treatment of benign, asymptomatic lesions may be considered cosmetic.    Screening exam for skin cancer  Upper body skin examination performed today including at least 6 points as noted in physical examination. No lesions suspicious for malignancy noted.    Recommend daily sun protection/avoidance and use of at least SPF 30, broad spectrum sunscreen (OTC drug).       Counseled that morphology of lesion on chest could be c/w SCCis or superficial BCC. Patient feels  confident that it has only been there for one week so biopsy deferred. If does not resolve then RTC for reeval and possible biopsy           Follow up in about 1 year (around 5/27/2025).

## 2024-05-27 NOTE — PATIENT INSTRUCTIONS
Sunscreen Guidelines  Sunscreen protects your skin by absorbing and reflecting ultraviolet rays. All sunscreens have a sun protection factor (SPF) rating that indicates how long a sunscreen will remain effective on the skin.    Why protect your skin?  The sun's rays are composed of many different wavelengths, including UVA, UVB, and visible light that each affect the skin differently.    UVB: sunburn, photoaging, skin cancer (melanoma, basal cell, and squamous cell carcinomas) and modulation of the skin's immune system.    UVA: similar to above but thought to contribute more to aging; at the same dose of UVB it is less powerful however the sun has 10-20 times the levels of UVA as compared with UVB.  Visible light: implicated in causing unwanted darkening of skin, such as melasma and post-inflammatory hyperpigmentation in darker skin types     If I have dark skin, do I need to worry about the sun?    More darkly pigmented skin is more protected against UV-induced skin cancer, sunburn, and photoaging, though may still suffer from sun-related conditions, including melasma, hyperpigmentation, and other dark spots.    Sun avoidance  As a general rule, stay out of the sun as much as possible between 10 a.m. - 4 p.m.    Download the EPA UV index angie to track the UV index by hour in your zip code.      Which sunscreen should I choose?  The best sunscreen to use varies by individual. The one that feels best on your skin and fits your lifestyle will be the one you will likely use most regularly.   Active ingredients of sunscreen vary by , and may be a chemical (such as avobenzone or oxybenzone) or physical agent (such as zinc oxide or titanium dioxide). I recommend a physical agent.  A water-resistant sunscreen is one that maintains the SPF level after 40 minutes of water exposure. A very water-resistant sunscreen maintains the SPF level after 80 minutes of water exposure.    Sunscreen: this is the last layer in  "sun protection   Be generous: 1 shot glass of sunscreen for your body, ½ teaspoon for your face/neck  Reapply every 2 hours  Broad spectrum (provides UVA/UVB protection), SPF 50 or above  Avoid spray sunscreens: less effective and have been found to contain benzene (carcinogen)    Sun protective clothing  Although sunscreen helps minimize sun damage, no sunscreen completely blocks all wavelengths of UV light. Wearing sun protective clothing such as hats, rashguards or swim shirts, and long sleeves and/or pants, as well as avoiding sun exposure from 10 a.m. to 4 p.m. will help protect your skin from overexposure and minimize sun damage. Seek shade.  Long sleeved clothing, hats, and sunglasses: makes sun protection easier, more effective, and can even be more affordable, since sunscreen needs to be reapplied frequently.    Sol[employer]ra (www.sunprectautions.G[employer] Pinpoint)  [employer] (www.Aorato)  Coolibar ([employer].Amazing [employer] Letters)  [employer] ([employer])  Hats from [employer] ([employer].helenkaminski.com)    My Favorite Sunscreens:  Physical blockers: Can have a "white case" but in general are more effective  - Face: CeraVe tinted mineral sunscreen, Bare Minerals complexion rescue (20 shades), Elta MD (UV elements, UV physical, UV restore, etc), Tizo ultra zinc tinted, Cetaphil Sheer Mineral Face Liquid Sunscreen  - Body: Blue Lizard, Neutrogena Sheer Zinc, Eucerin Daily Protection, Aveeno Baby       CRYOSURGERY      Your doctor has used a method called cryosurgery to treat your skin condition. Cryosurgery refers to the use of very cold substances to treat a variety of skin conditions such as warts, pre-skin cancers, molluscum contagiosum, sun spots, and several benign growths. The substance we use in cryosurgery is liquid nitrogen and is so cold (-195 degrees Celsius) that is burns when administered.     Following treatment in the office, the skin may immediately burn and become red. You may find the area around the lesion " is affected as well. It is sometimes necessary to treat not only the lesion, but a small area of the surrounding normal skin to achieve a good response.     A blister, and even a blood filled blister, may form after treatment.   This is a normal response. If the blister is painful, it is acceptable to sterilize a needle and with rubbing alcohol and gently pop the blister. It is important that you gently wash the area with soap and warm water as the blister fluid may contain wart virus if a wart was treated. Do no remove the roof of the blister.     The area treated can take anywhere from 1-3 weeks to heal. Healing time depends on the kind skin lesion treated, the location, and how aggressively the lesion was treated. It is recommended that the areas treated are covered with Vaseline or bacitracin ointment and a band-aid. If a band-aid is not practical, just ointment applied several times per day will do. Keeping these areas moist will speed the healing time.    Treatment with liquid nitrogen can leave a scar. In dark skin, it may be a light or dark scar, in light skin it may be a white or pink scar. These will generally fade with time, but may never go away completely.     If you have any concerns after your treatment, please feel free to call the office.       [address] [place] [address]/ [phone] FAX/ [email or web address]

## 2025-03-06 DIAGNOSIS — F32.A DEPRESSION, UNSPECIFIED DEPRESSION TYPE: ICD-10-CM

## 2025-03-06 DIAGNOSIS — F41.9 ANXIETY: ICD-10-CM

## 2025-03-06 RX ORDER — FLUOXETINE HYDROCHLORIDE 20 MG/1
CAPSULE ORAL
Qty: 90 CAPSULE | Refills: 0 | Status: SHIPPED | OUTPATIENT
Start: 2025-03-06

## 2025-03-06 RX ORDER — TRAZODONE HYDROCHLORIDE 100 MG/1
200 TABLET ORAL NIGHTLY
Qty: 180 TABLET | Refills: 0 | Status: SHIPPED | OUTPATIENT
Start: 2025-03-06

## 2025-03-06 NOTE — TELEPHONE ENCOUNTER
Refill Decision Note   Cristobal James  is requesting a refill authorization.  Brief Assessment and Rationale for Refill:  Approve     Medication Therapy Plan:         Comments:     Note composed:8:16 AM 03/06/2025

## 2025-03-06 NOTE — TELEPHONE ENCOUNTER
No care due was identified.  [facility] Embedded Care Due Messages. Reference number: [id number].   3/06/2025 5:15:53 AM CST

## 2025-03-07 NOTE — TELEPHONE ENCOUNTER
Refill Decision Note   Cristobal James  is requesting a refill authorization.  Brief Assessment and Rationale for Refill:  Approve     Medication Therapy Plan:         Comments:     Note composed:11:44 PM 03/06/2025

## 2025-03-07 NOTE — TELEPHONE ENCOUNTER
No care due was identified.  [facility] Embedded Care Due Messages. Reference number: [id number].   3/06/2025 7:24:25 PM CST

## 2025-03-20 NOTE — SUBJECTIVE & OBJECTIVE
Past Medical History:   Diagnosis Date    Allergy     Anxiety     Depression        Past Surgical History:   Procedure Laterality Date    COLONOSCOPY N/A 10/24/2016    Performed by [facility] ([facility])    COLONOSCOPY N/A 9/16/2016    Performed by Dalton Lane MD at [facility] (4TH FLR)    RHINOPLASTY TIP  1985       Review of patient's allergies indicates:   Allergen Reactions    Pollen extracts        Current Facility-Administered Medications   Medication    acetaminophen tablet 650 mg    buPROPion TB24 tablet 300 mg    cefTRIAXone (ROCEPHIN) 2 g in dextrose 5 % 50 mL IVPB    dextrose 10% (D10W) Bolus    dextrose 10% (D10W) Bolus    emtricitabine-tenofovir 200-300 mg per tablet 1 tablet    enoxaparin injection 40 mg    FLUoxetine capsule 20 mg    glucagon (human recombinant) injection 1 mg    glucose chewable tablet 16 g    glucose chewable tablet 24 g    ondansetron disintegrating tablet 8 mg    oxyCODONE immediate release tablet 10 mg    oxyCODONE immediate release tablet 5 mg    promethazine tablet 25 mg    ramelteon tablet 8 mg    sodium chloride 0.9% flush 10 mL    traZODone tablet 100 mg    vancomycin (VANCOCIN) 2,250 mg in dextrose 5 % 500 mL IVPB    vancomycin 1.25 g in dextrose 5% 250 mL IVPB (ready to mix)     Current Outpatient Medications   Medication Sig    buPROPion (WELLBUTRIN XL) 300 MG 24 hr tablet TAKE 1 TABLET BY MOUTH DAILY    buPROPion (WELLBUTRIN XL) 300 MG 24 hr tablet Take 1 tablet (300 mg total) by mouth once daily.    FLUoxetine (PROZAC) 20 MG capsule TAKE 1 CAPSULE(20 MG) BY MOUTH EVERY DAY    FLUoxetine (PROZAC) 20 MG capsule Take 1 capsule (20 mg total) by mouth once daily.    FLUoxetine 20 MG capsule TAKE 1 CAPSULE BY MOUTH ONCE DAILY    traZODone (DESYREL) 100 MG tablet Take 1 tablet (100 mg total) by mouth every evening.    traZODone (DESYREL) 100 MG tablet TAKE 2 TABLETS(200 MG) BY MOUTH EVERY NIGHT AS NEEDED FOR INSOMNIA  "   TRUVADA 200-300 mg Tab TK 1 T PO  D     Family History     None        Tobacco Use    Smoking status: Current Every Day Smoker     Packs/day: 1.00     Types: Cigarettes    Smokeless tobacco: Never Used   Substance and Sexual Activity    Alcohol use: No    Drug use: No    Sexual activity: Yes     Partners: Male     Birth control/protection: None     ROS See ER Provider ROS    Objective:     Vital Signs (Most Recent):  Temp: 97.6 °F (36.4 °C) (08/31/19 1910)  Pulse: 66 (08/31/19 2239)  Resp: 18 (08/31/19 2239)  BP: 120/70 (08/31/19 2239)  SpO2: 98 % (08/31/19 2239) Vital Signs (24h Range):  Temp:  [97.6 °F (36.4 °C)] 97.6 °F (36.4 °C)  Pulse:  [66-73] 66  Resp:  [16-18] 18  SpO2:  [96 %-98 %] 98 %  BP: (120-122)/(70-76) 120/70     Weight: 81.2 kg (179 lb)  Height: 5' 11" (180.3 cm)  Body mass index is 24.97 kg/m².    No intake or output data in the 24 hours ending 09/01/19 0014    Ortho/SPM Exam     RUE  4x5 cm macerated, erythematous lesion over posterior elbow with purulent drainage. Erythema of skin tracking medially along arm  TTP over olecranon bursa and wound  FROM shoulder, elbow and wrist. Painless with ROM.  SILT M/U/R  Motor intact AIN/PIN/M/U/R   Cap refill < 2s  2+ RP      Significant Labs:   CBC:   Recent Labs   Lab 08/31/19 2004   WBC 6.97   HGB 12.0*   HCT 37.3*        CRP:   Recent Labs   Lab 08/31/19 2004   .2*     All pertinent labs within the past 24 hours have been reviewed.    Significant Imaging: I have reviewed and interpreted all pertinent imaging results/findings.   XR R elbow shows no fx or dislocation  " POST-OP DIAGNOSIS:  Trigger finger 20-Mar-2025 07:52:47  Jennifer Blackman

## 2025-04-08 ENCOUNTER — PATIENT MESSAGE (OUTPATIENT)
Dept: PRIMARY CARE CLINIC | Facility: CLINIC | Age: 60
End: 2025-04-08
Payer: COMMERCIAL

## 2025-04-08 DIAGNOSIS — Z00.00 ANNUAL PHYSICAL EXAM: ICD-10-CM

## 2025-04-09 ENCOUNTER — LAB VISIT (OUTPATIENT)
Dept: LAB | Facility: HOSPITAL | Age: 60
End: 2025-04-09
Payer: COMMERCIAL

## 2025-04-09 DIAGNOSIS — Z00.00 ANNUAL PHYSICAL EXAM: ICD-10-CM

## 2025-04-09 LAB
ABSOLUTE EOSINOPHIL (OHS): 0.09 K/UL
ABSOLUTE MONOCYTE (OHS): 0.66 K/UL (ref 0.3–1)
ABSOLUTE NEUTROPHIL COUNT (OHS): 3.86 K/UL (ref 1.8–7.7)
ALBUMIN SERPL BCP-MCNC: 3.9 G/DL (ref 3.5–5.2)
ALP SERPL-CCNC: 66 UNIT/L (ref 40–150)
ALT SERPL W/O P-5'-P-CCNC: 33 UNIT/L (ref 10–44)
ANION GAP (OHS): 8 MMOL/L (ref 8–16)
AST SERPL-CCNC: 21 UNIT/L (ref 11–45)
BASOPHILS # BLD AUTO: 0.04 K/UL
BASOPHILS NFR BLD AUTO: 0.6 %
BILIRUB SERPL-MCNC: 0.4 MG/DL (ref 0.1–1)
BUN SERPL-MCNC: 15 MG/DL (ref 6–20)
CALCIUM SERPL-MCNC: 8.8 MG/DL (ref 8.7–10.5)
CHLORIDE SERPL-SCNC: 109 MMOL/L (ref 95–110)
CHOLEST SERPL-MCNC: 215 MG/DL (ref 120–199)
CHOLEST/HDLC SERPL: 5.2 {RATIO} (ref 2–5)
CO2 SERPL-SCNC: 23 MMOL/L (ref 23–29)
CREAT SERPL-MCNC: 0.9 MG/DL (ref 0.5–1.4)
EAG (OHS): 100 MG/DL (ref 68–131)
ERYTHROCYTE [DISTWIDTH] IN BLOOD BY AUTOMATED COUNT: 11.6 % (ref 11.5–14.5)
GFR SERPLBLD CREATININE-BSD FMLA CKD-EPI: >60 ML/MIN/1.73/M2
GLUCOSE SERPL-MCNC: 111 MG/DL (ref 70–110)
HBA1C MFR BLD: 5.1 % (ref 4–5.6)
HCT VFR BLD AUTO: 44.5 % (ref 40–54)
HDLC SERPL-MCNC: 41 MG/DL (ref 40–75)
HDLC SERPL: 19.1 % (ref 20–50)
HGB BLD-MCNC: 15.1 GM/DL (ref 14–18)
IMM GRANULOCYTES # BLD AUTO: 0.03 K/UL (ref 0–0.04)
IMM GRANULOCYTES NFR BLD AUTO: 0.5 % (ref 0–0.5)
LDLC SERPL CALC-MCNC: 147.8 MG/DL (ref 63–159)
LYMPHOCYTES # BLD AUTO: 1.66 K/UL (ref 1–4.8)
MCH RBC QN AUTO: 30.8 PG (ref 27–31)
MCHC RBC AUTO-ENTMCNC: 33.9 G/DL (ref 32–36)
MCV RBC AUTO: 91 FL (ref 82–98)
NONHDLC SERPL-MCNC: 174 MG/DL
NUCLEATED RBC (/100WBC) (OHS): 0 /100 WBC
PLATELET # BLD AUTO: 256 K/UL (ref 150–450)
PMV BLD AUTO: 9.5 FL (ref 9.2–12.9)
POTASSIUM SERPL-SCNC: 4.1 MMOL/L (ref 3.5–5.1)
PROT SERPL-MCNC: 6.9 GM/DL (ref 6–8.4)
PSA SERPL-MCNC: 1.29 NG/ML
RBC # BLD AUTO: 4.9 M/UL (ref 4.6–6.2)
RELATIVE EOSINOPHIL (OHS): 1.4 %
RELATIVE LYMPHOCYTE (OHS): 26.2 % (ref 18–48)
RELATIVE MONOCYTE (OHS): 10.4 % (ref 4–15)
RELATIVE NEUTROPHIL (OHS): 60.9 % (ref 38–73)
SODIUM SERPL-SCNC: 140 MMOL/L (ref 136–145)
TRIGL SERPL-MCNC: 131 MG/DL (ref 30–150)
TSH SERPL-ACNC: 1.15 UIU/ML (ref 0.4–4)
WBC # BLD AUTO: 6.34 K/UL (ref 3.9–12.7)

## 2025-04-09 PROCEDURE — 84443 ASSAY THYROID STIM HORMONE: CPT

## 2025-04-09 PROCEDURE — 80061 LIPID PANEL: CPT

## 2025-04-09 PROCEDURE — 85025 COMPLETE CBC W/AUTO DIFF WBC: CPT

## 2025-04-09 PROCEDURE — 82040 ASSAY OF SERUM ALBUMIN: CPT

## 2025-04-09 PROCEDURE — 36415 COLL VENOUS BLD VENIPUNCTURE: CPT | Mod: PN

## 2025-04-09 PROCEDURE — 84153 ASSAY OF PSA TOTAL: CPT

## 2025-04-09 PROCEDURE — 83036 HEMOGLOBIN GLYCOSYLATED A1C: CPT

## 2025-04-24 ENCOUNTER — OFFICE VISIT (OUTPATIENT)
Dept: PRIMARY CARE CLINIC | Facility: CLINIC | Age: 60
End: 2025-04-24
Payer: COMMERCIAL

## 2025-04-24 VITALS
HEART RATE: 75 BPM | SYSTOLIC BLOOD PRESSURE: 118 MMHG | OXYGEN SATURATION: 98 % | BODY MASS INDEX: 25.58 KG/M2 | WEIGHT: 182.75 LBS | HEIGHT: 71 IN | DIASTOLIC BLOOD PRESSURE: 82 MMHG

## 2025-04-24 DIAGNOSIS — F41.9 ANXIETY: ICD-10-CM

## 2025-04-24 DIAGNOSIS — G47.00 INSOMNIA, UNSPECIFIED TYPE: ICD-10-CM

## 2025-04-24 DIAGNOSIS — Z00.00 ANNUAL PHYSICAL EXAM: Primary | ICD-10-CM

## 2025-04-24 DIAGNOSIS — F34.1 DYSTHYMIA: ICD-10-CM

## 2025-04-24 DIAGNOSIS — Z79.899 ON PRE-EXPOSURE PROPHYLAXIS FOR HIV: ICD-10-CM

## 2025-04-24 DIAGNOSIS — E78.2 MIXED HYPERLIPIDEMIA: ICD-10-CM

## 2025-04-24 PROCEDURE — 99999 PR PBB SHADOW E&M-EST. PATIENT-LVL III: CPT | Mod: PBBFAC,,, | Performed by: STUDENT IN AN ORGANIZED HEALTH CARE EDUCATION/TRAINING PROGRAM

## 2025-04-24 RX ORDER — BUPROPION HYDROCHLORIDE 300 MG/1
300 TABLET ORAL DAILY
Qty: 90 TABLET | Refills: 3 | Status: SHIPPED | OUTPATIENT
Start: 2025-04-24

## 2025-04-24 RX ORDER — FLUOXETINE HYDROCHLORIDE 20 MG/1
20 CAPSULE ORAL DAILY
Qty: 90 CAPSULE | Refills: 3 | Status: SHIPPED | OUTPATIENT
Start: 2025-04-24

## 2025-04-24 RX ORDER — TRAZODONE HYDROCHLORIDE 100 MG/1
200 TABLET ORAL NIGHTLY PRN
Qty: 180 TABLET | Refills: 1 | Status: SHIPPED | OUTPATIENT
Start: 2025-04-24

## 2025-04-24 NOTE — PROGRESS NOTES
Cristobal Ramirez  1965        Subjective     Chief Complaint: Annual    History of Present Illness:  Mr. Cristobal Ramirez is a 59 y.o. male who presents to clinic for annual.    HLD- LDL up to 147. Was on a vacation around this time, more pastries.   Also less exercise. Will work on lifestyle changes.     The 10-year ASCVD risk score (Fern LAYTON, et al., 2019) is: 8.6%    Values used to calculate the score:      Age: 59 years      Sex: Male      Is Non- : No      Diabetic: No      Tobacco smoker: No      Systolic Blood Pressure: 118 mmHg      Is BP treated: No      HDL Cholesterol: 41 mg/dL      Total Cholesterol: 215 mg/dL       Anxiety- on Wellbutrin 300 mg, Fluoxetine 20 mg. +Trazadone 100 mg.   Stable. Feeling well. Would like refills.     Dexcovy for PrEP. Not every day.   Does through [facility]. Checking HIV with them every 6m.   Would like to continue following with them for maintenance.    BP Readings from Last 5 Encounters:   04/24/25 118/82   03/27/24 120/76   11/09/22 118/80   01/26/22 136/82   01/20/21 120/76     Colon- done 2020. Q6 yrs.  PSA- done.    Answers submitted by the patient for this visit:  Review of Systems Questionnaire (Submitted on 4/17/2025)  activity change: No  unexpected weight change: No  rhinorrhea: No  trouble swallowing: No  visual disturbance: No  chest tightness: No  polyuria: No  difficulty urinating: No  joint swelling: No  arthralgias: No  confusion: No  dysphoric mood: No        Review of Systems   Constitutional:  Negative for chills, fever and malaise/fatigue.   HENT:  Negative for hearing loss.    Eyes:  Negative for discharge.   Respiratory:  Negative for wheezing.    Cardiovascular:  Negative for chest pain, palpitations and leg swelling.   Gastrointestinal:  Negative for abdominal pain, blood in stool, constipation, diarrhea and vomiting.   Genitourinary:  Negative for hematuria and urgency.   Musculoskeletal:  Negative for joint  "pain and neck pain.   Neurological:  Negative for weakness and headaches.   Endo/Heme/Allergies:  Negative for polydipsia.   Psychiatric/Behavioral:  The patient is not nervous/anxious.         PAST HISTORY:     Past Medical History:   Diagnosis Date    Allergy     Anxiety     Depression        Past Surgical History:   Procedure Laterality Date    COLONOSCOPY N/A 9/16/2016    Procedure: COLONOSCOPY;  Surgeon: Dalton Lane MD;  Location: [address]);  Service: Endoscopy;  Laterality: N/A;    COLONOSCOPY N/A 10/24/2016    Procedure: COLONOSCOPY;  Surgeon: Ozzy Wakefield MD;  [address]  Service: Endoscopy;  Laterality: N/A;    COLONOSCOPY N/A 1/3/2020    Procedure: COLONOSCOPY;  Surgeon: Adolph Costello MD;  Location: [facility] ([address] [place] [address]);  Service: Endoscopy;  Laterality: N/A;    RHINOPLASTY TIP  1985       Family History   Problem Relation Name Age of Onset    Prostate cancer Paternal Grandfather      Melanoma Neg Hx           MEDICATIONS & ALLERGIES:     Current Outpatient Medications on File Prior to Visit   Medication Sig    DESCOVY 200-25 mg Tab TK 1 T PO D    [DISCONTINUED] buPROPion (WELLBUTRIN XL) 300 MG 24 hr tablet Take 1 tablet (300 mg total) by mouth once daily.    [DISCONTINUED] FLUoxetine 20 MG capsule TAKE 1 CAPSULE(20 MG) BY MOUTH DAILY    [DISCONTINUED] traZODone (DESYREL) 100 MG tablet TAKE 2 TABLETS BY MOUTH EVERY NIGHT AT BEDTIME     No current facility-administered medications on file prior to visit.       Review of patient's allergies indicates:   Allergen Reactions    Pollen extracts        OBJECTIVE:     Vital Signs:  Vitals:    04/24/25 0924   BP: 118/82   BP Location: Left arm   Patient Position: Sitting   Pulse: 75   SpO2: 98%   Weight: 82.9 kg (182 lb 12.2 oz)   Height: 5' 11" (1.803 m)       Body mass index is 25.49 kg/m².     Physical Exam:  Physical Exam  Vitals and nursing note reviewed.   Constitutional:       General: He is not in acute distress.    " " Appearance: Normal appearance. He is not ill-appearing, toxic-appearing or diaphoretic.   HENT:      Head: Normocephalic and atraumatic.      Right Ear: Tympanic membrane, ear canal and external ear normal.      Left Ear: Tympanic membrane, ear canal and external ear normal.      Mouth/Throat:      Mouth: Mucous membranes are moist.      Pharynx: No oropharyngeal exudate or posterior oropharyngeal erythema.   Eyes:      General: No scleral icterus.        Right eye: No discharge.         Left eye: No discharge.      Conjunctiva/sclera: Conjunctivae normal.   Cardiovascular:      Rate and Rhythm: Normal rate and regular rhythm.      Pulses: Normal pulses.      Heart sounds: Normal heart sounds. No murmur heard.  Pulmonary:      Effort: Pulmonary effort is normal. No respiratory distress.      Breath sounds: Normal breath sounds. No wheezing.   Abdominal:      Tenderness: There is no right CVA tenderness or left CVA tenderness.   Musculoskeletal:         General: Normal range of motion.      Cervical back: Normal range of motion and neck supple. No rigidity or tenderness.      Right lower leg: No edema.      Left lower leg: No edema.   Lymphadenopathy:      Cervical: No cervical adenopathy.   Skin:     General: Skin is warm and dry.   Neurological:      Mental Status: He is alert and oriented to person, place, and time. Mental status is at baseline.      Gait: Gait normal.   Psychiatric:         Mood and Affect: Mood normal.         Behavior: Behavior normal.            Laboratory  Lab Results   Component Value Date    GLU 94 04/01/2024     04/09/2025    K 4.1 04/09/2025     04/09/2025    CO2 23 04/09/2025    BUN 15 04/09/2025    CREATININE 0.9 04/09/2025    CALCIUM 8.8 04/09/2025    MG 2.2 09/04/2019     Lab Results   Component Value Date    HGBA1C 5.1 04/09/2025     No results for input(s): "POCTGLUCOSE" in the last 72 hours.        ASSESSMENT & PLAN:   Mr. Cristobal Ramirez is a 59 y.o. male who was " seen today in clinic for annual.   Repeat Lipids in 6m. If still high, recommend statin.    1. Annual physical exam    2. Mixed hyperlipidemia  -     Lipid Panel; Future; Expected date: 10/24/2025    3. Anxiety  -     buPROPion (WELLBUTRIN XL) 300 MG 24 hr tablet; Take 1 tablet (300 mg total) by mouth once daily.  Dispense: 90 tablet; Refill: 3  -     FLUoxetine 20 MG capsule; Take 1 capsule (20 mg total) by mouth once daily.  Dispense: 90 capsule; Refill: 3    4. Dysthymia  -     buPROPion (WELLBUTRIN XL) 300 MG 24 hr tablet; Take 1 tablet (300 mg total) by mouth once daily.  Dispense: 90 tablet; Refill: 3  -     FLUoxetine 20 MG capsule; Take 1 capsule (20 mg total) by mouth once daily.  Dispense: 90 capsule; Refill: 3    5. Insomnia, unspecified type  -     traZODone (DESYREL) 100 MG tablet; Take 2 tablets (200 mg total) by mouth nightly as needed for Insomnia.  Dispense: 180 tablet; Refill: 1    6. On pre-exposure prophylaxis for HIV             Soila Caputo MD

## 2025-06-03 DIAGNOSIS — G47.00 INSOMNIA, UNSPECIFIED TYPE: ICD-10-CM

## 2025-06-03 RX ORDER — TRAZODONE HYDROCHLORIDE 100 MG/1
200 TABLET ORAL NIGHTLY
Qty: 180 TABLET | Refills: 1 | Status: SHIPPED | OUTPATIENT
Start: 2025-06-03

## 2025-08-26 ENCOUNTER — OFFICE VISIT (OUTPATIENT)
Dept: DERMATOLOGY | Facility: CLINIC | Age: 60
End: 2025-08-26
Payer: COMMERCIAL

## 2025-08-26 DIAGNOSIS — D48.5 NEOPLASM OF UNCERTAIN BEHAVIOR OF SKIN: Primary | ICD-10-CM

## 2025-08-26 DIAGNOSIS — L82.1 SEBORRHEIC KERATOSES: ICD-10-CM

## 2025-08-26 DIAGNOSIS — L81.4 LENTIGO: ICD-10-CM

## 2025-08-26 DIAGNOSIS — Z12.83 SCREENING EXAM FOR SKIN CANCER: ICD-10-CM

## 2025-08-26 DIAGNOSIS — D22.9 MULTIPLE BENIGN NEVI: ICD-10-CM

## 2025-08-26 DIAGNOSIS — L57.0 ACTINIC KERATOSIS: ICD-10-CM

## 2025-08-26 DIAGNOSIS — D18.01 CHERRY ANGIOMA: ICD-10-CM

## 2025-08-26 PROCEDURE — 88305 TISSUE EXAM BY PATHOLOGIST: CPT | Mod: TC | Performed by: STUDENT IN AN ORGANIZED HEALTH CARE EDUCATION/TRAINING PROGRAM

## 2025-08-26 PROCEDURE — 3044F HG A1C LEVEL LT 7.0%: CPT | Mod: CPTII,S$GLB,, | Performed by: STUDENT IN AN ORGANIZED HEALTH CARE EDUCATION/TRAINING PROGRAM

## 2025-08-26 PROCEDURE — 1160F RVW MEDS BY RX/DR IN RCRD: CPT | Mod: CPTII,S$GLB,, | Performed by: STUDENT IN AN ORGANIZED HEALTH CARE EDUCATION/TRAINING PROGRAM

## 2025-08-26 PROCEDURE — 11103 TANGNTL BX SKIN EA SEP/ADDL: CPT | Mod: S$GLB,,, | Performed by: STUDENT IN AN ORGANIZED HEALTH CARE EDUCATION/TRAINING PROGRAM

## 2025-08-26 PROCEDURE — 17000 DESTRUCT PREMALG LESION: CPT | Mod: XS,S$GLB,, | Performed by: STUDENT IN AN ORGANIZED HEALTH CARE EDUCATION/TRAINING PROGRAM

## 2025-08-26 PROCEDURE — 11102 TANGNTL BX SKIN SINGLE LES: CPT | Mod: S$GLB,,, | Performed by: STUDENT IN AN ORGANIZED HEALTH CARE EDUCATION/TRAINING PROGRAM

## 2025-08-26 PROCEDURE — 99213 OFFICE O/P EST LOW 20 MIN: CPT | Mod: 25,S$GLB,, | Performed by: STUDENT IN AN ORGANIZED HEALTH CARE EDUCATION/TRAINING PROGRAM

## 2025-08-26 PROCEDURE — 99999 PR PBB SHADOW E&M-EST. PATIENT-LVL III: CPT | Mod: PBBFAC,,, | Performed by: STUDENT IN AN ORGANIZED HEALTH CARE EDUCATION/TRAINING PROGRAM

## 2025-08-26 PROCEDURE — 1159F MED LIST DOCD IN RCRD: CPT | Mod: CPTII,S$GLB,, | Performed by: STUDENT IN AN ORGANIZED HEALTH CARE EDUCATION/TRAINING PROGRAM

## 2025-08-26 PROCEDURE — 17003 DESTRUCT PREMALG LES 2-14: CPT | Mod: S$GLB,,, | Performed by: STUDENT IN AN ORGANIZED HEALTH CARE EDUCATION/TRAINING PROGRAM

## 2025-08-28 LAB
ESTROGEN SERPL-MCNC: NORMAL PG/ML
INSULIN SERPL-ACNC: NORMAL U[IU]/ML
LAB AP CLINICAL INFORMATION: NORMAL
LAB AP GROSS DESCRIPTION: NORMAL
LAB AP PERFORMING LOCATION(S): NORMAL
LAB AP REPORT FOOTNOTES: NORMAL
T3RU NFR SERPL: NORMAL %

## 2025-08-29 ENCOUNTER — TELEPHONE (OUTPATIENT)
Dept: DERMATOLOGY | Facility: CLINIC | Age: 60
End: 2025-08-29
Payer: COMMERCIAL

## 2025-09-02 ENCOUNTER — TELEPHONE (OUTPATIENT)
Dept: DERMATOLOGY | Facility: CLINIC | Age: 60
End: 2025-09-02
Payer: COMMERCIAL

## 2025-09-02 ENCOUNTER — OFFICE VISIT (OUTPATIENT)
Dept: DERMATOLOGY | Facility: CLINIC | Age: 60
End: 2025-09-02
Payer: COMMERCIAL

## 2025-09-02 DIAGNOSIS — Z85.828 HISTORY OF SKIN CANCER: ICD-10-CM

## 2025-09-02 DIAGNOSIS — C44.91 BASAL CELL CARCINOMA (BCC), UNSPECIFIED SITE: Primary | ICD-10-CM

## 2025-09-02 PROCEDURE — 99999 PR PBB SHADOW E&M-EST. PATIENT-LVL III: CPT | Mod: PBBFAC,,, | Performed by: STUDENT IN AN ORGANIZED HEALTH CARE EDUCATION/TRAINING PROGRAM

## 2025-09-02 PROCEDURE — 17262 DSTRJ MAL LES T/A/L 1.1-2.0: CPT | Mod: 79,S$GLB,, | Performed by: STUDENT IN AN ORGANIZED HEALTH CARE EDUCATION/TRAINING PROGRAM

## 2025-09-02 PROCEDURE — 99499 UNLISTED E&M SERVICE: CPT | Mod: S$GLB,,, | Performed by: STUDENT IN AN ORGANIZED HEALTH CARE EDUCATION/TRAINING PROGRAM

## 2025-09-04 ENCOUNTER — OFFICE VISIT (OUTPATIENT)
Dept: DERMATOLOGY | Facility: CLINIC | Age: 60
End: 2025-09-04
Payer: COMMERCIAL

## 2025-09-04 VITALS — SYSTOLIC BLOOD PRESSURE: 100 MMHG | HEART RATE: 66 BPM | DIASTOLIC BLOOD PRESSURE: 63 MMHG

## 2025-09-04 DIAGNOSIS — C44.311 BASAL CELL CARCINOMA OF RIGHT SIDE OF NOSE: Primary | ICD-10-CM

## 2025-09-04 DIAGNOSIS — L57.0 ACTINIC KERATOSIS: ICD-10-CM

## 2025-09-04 PROCEDURE — 99999 PR PBB SHADOW E&M-EST. PATIENT-LVL III: CPT | Mod: PBBFAC,,, | Performed by: DERMATOLOGY
